# Patient Record
Sex: MALE | Race: ASIAN | NOT HISPANIC OR LATINO | Employment: FULL TIME | URBAN - METROPOLITAN AREA
[De-identification: names, ages, dates, MRNs, and addresses within clinical notes are randomized per-mention and may not be internally consistent; named-entity substitution may affect disease eponyms.]

---

## 2017-12-27 ENCOUNTER — HOSPITAL ENCOUNTER (EMERGENCY)
Facility: CLINIC | Age: 32
Discharge: HOME | End: 2017-12-27
Attending: EMERGENCY MEDICINE | Admitting: EMERGENCY MEDICINE
Payer: COMMERCIAL

## 2017-12-27 ENCOUNTER — APPOINTMENT (OUTPATIENT)
Dept: CT IMAGING | Facility: CLINIC | Age: 32
End: 2017-12-27
Attending: EMERGENCY MEDICINE
Payer: COMMERCIAL

## 2017-12-27 VITALS
TEMPERATURE: 98.3 F | WEIGHT: 193 LBS | RESPIRATION RATE: 16 BRPM | DIASTOLIC BLOOD PRESSURE: 91 MMHG | BODY MASS INDEX: 30.29 KG/M2 | HEART RATE: 81 BPM | HEIGHT: 67 IN | SYSTOLIC BLOOD PRESSURE: 159 MMHG | OXYGEN SATURATION: 97 %

## 2017-12-27 DIAGNOSIS — N20.1 URETEROLITHIASIS: ICD-10-CM

## 2017-12-27 LAB
ALBUMIN UR-MCNC: 30 MG/DL
APPEARANCE UR: CLEAR
BACTERIA #/AREA URNS HPF: ABNORMAL /HPF
BILIRUB UR QL STRIP: NEGATIVE
COLOR UR AUTO: YELLOW
GLUCOSE UR STRIP-MCNC: NEGATIVE MG/DL
HGB UR QL STRIP: ABNORMAL
KETONES UR STRIP-MCNC: NEGATIVE MG/DL
LEUKOCYTE ESTERASE UR QL STRIP: NEGATIVE
MUCOUS THREADS #/AREA URNS LPF: PRESENT /LPF
NITRATE UR QL: NEGATIVE
PH UR STRIP: 5.5 PH (ref 5–7)
RBC #/AREA URNS AUTO: ABNORMAL /HPF
SOURCE: ABNORMAL
SP GR UR STRIP: 1.02 (ref 1–1.03)
UROBILINOGEN UR STRIP-ACNC: 0.2 EU/DL (ref 0.2–1)
WBC #/AREA URNS AUTO: ABNORMAL /HPF

## 2017-12-27 PROCEDURE — 99284 EMERGENCY DEPT VISIT MOD MDM: CPT | Mod: 25

## 2017-12-27 PROCEDURE — 81001 URINALYSIS AUTO W/SCOPE: CPT | Performed by: EMERGENCY MEDICINE

## 2017-12-27 PROCEDURE — 74176 CT ABD & PELVIS W/O CONTRAST: CPT

## 2017-12-27 RX ORDER — OXYCODONE AND ACETAMINOPHEN 5; 325 MG/1; MG/1
1-2 TABLET ORAL EVERY 4 HOURS PRN
Qty: 20 TABLET | Refills: 0 | Status: ON HOLD | OUTPATIENT
Start: 2017-12-27 | End: 2018-04-24

## 2017-12-27 RX ORDER — TAMSULOSIN HYDROCHLORIDE 0.4 MG/1
0.4 CAPSULE ORAL DAILY
Qty: 10 CAPSULE | Refills: 0 | Status: SHIPPED | OUTPATIENT
Start: 2017-12-27 | End: 2018-01-06

## 2017-12-27 NOTE — ED AVS SNAPSHOT
Emergency Department    [address] [place] [address]    Phone:  [phone]    Fax:  [phone]                                       Alonso Kan   MRN: [id number]    Department:   Emergency Department   Date of Visit:  12/27/2017           After Visit Summary Signature Page     I have received my discharge instructions, and my questions have been answered. I have discussed any challenges I see with this plan with the nurse or doctor.    ..........................................................................................................................................  Patient/Patient Representative Signature      ..........................................................................................................................................  Patient Representative Print Name and Relationship to Patient    ..................................................               ................................................  Date                                            Time    ..........................................................................................................................................  Reviewed by Signature/Title    ...................................................              ..............................................  Date                                                            Time

## 2017-12-27 NOTE — ED AVS SNAPSHOT
Emergency Department    [address] [facility] [address]    Phone:  [phone]    Fax:  [phone]                                       Alonso Kan   MRN: [id number]    Department:   Emergency Department   Date of Visit:  12/27/2017           Patient Information     Date Of Birth          1985        Your diagnoses for this visit were:     Ureterolithiasis        You were seen by Trierweiler, Chad A, MD.      Follow-up Information     Follow up with Rosales Sánchez MD In 1 week.    Specialty:  Urology    Why:  As needed    Contact information:    [address]  [facility] [address] [phone]          Follow up with  Emergency Department.    Specialty:  EMERGENCY MEDICINE    Why:  If symptoms worsen    Contact information:    [address] [facility] [address] [phone]        Discharge Instructions       Discharge Instructions  Kidney Stones    Kidney stones are a common problem that can cause a lot of pain but fortunately are usually not dangerous. Kidney stones form in the kidney and then can cause a blockage (obstruction) of the flow of urine from the kidney which leads to pain. Most patients can manage kidney stones at home (without a hospital stay).  However, sometimes your condition may be worse than it seemed at first, or may get worse with time. Most kidney stones will pass on their own, but occasionally stones may need to be removed by an urologist.    Generally, every Emergency Department visit should have a follow-up clinic visit with either a primary or a specialty clinic/provider. Please follow-up as instructed by your emergency provider today.      Return to the Emergency Department if:    Your pain is not controlled despite the medications provided or recommended.    You are vomiting (throwing up) and cannot keep fluids or medications down.    You develop a fever (>100.4 F).    You feel much more ill or develop new  symptoms.  What can I do to help myself?    Be sure to drink plenty of fluids.    If instructed to do so, strain your urine (pee) with the urine strainer you were provided with today. Your stone may look like a grain of sand or a small pebble. Collect any stones in the cup provided and bring to your follow-up appointment.    Staying active is good, and may help the stone to pass. You may do whatever you feel up to doing without restrictions.   Treatment:    Non-steroidal anti-inflammatory drugs (NSAIDs). This includes prescription medicines like Toradol  (ketorolac) and non-prescription medicines like Advil  (ibuprofen) and Nuprin  (ibuprofen) and Naproxen. These pain relievers are very effective for kidney stones.    Nausea (sick to your stomach) medication.  Nausea and vomiting are common with kidney stones, so your provider may send you home with medicine for this.     Flomax  (tamsulosin). This medicine is sometimes used for men with prostate problems, but also can help kidney stones to pass. Its effectiveness is controversial or questionable so it is prescribed in certain situations. This medicine can lower blood pressure, and you may feel faint/lightheaded, especially when you first stand up. Be sure to get up gradually, sit down if you feel faint, and avoid activity where feeling faint would be dangerous, such as climbing ladders.  If you were given a prescription for medicine here today, be sure to read all of the information (including the package insert) that comes with your prescription.  This will include important information about the medicine, its side effects, and any warnings that you need to know about.  The pharmacist who fills the prescription can provide more information and answer questions you may have about the medicine.  If you have questions or concerns that the pharmacist cannot address, please call or return to the Emergency Department.   Remember that you can always come back to the  Emergency Department if you are not able to see your regular provider in the amount of time listed above, if you get any new symptoms, or if there is anything that worries you.      24 Hour Appointment Hotline       To make an appointment at any [facility], call [phone] ([phone]). If you don't have a family doctor or clinic, we will help you find one. [facility] clinics are conveniently located to serve the needs of you and your family.             Review of your medicines      START taking        Dose / Directions Last dose taken    oxyCODONE-acetaminophen 5-325 MG per tablet   Commonly known as:  PERCOCET   Dose:  1-2 tablet   Quantity:  20 tablet        Take 1-2 tablets by mouth every 4 hours as needed for moderate to severe pain   Refills:  0        tamsulosin 0.4 MG capsule   Commonly known as:  FLOMAX   Dose:  0.4 mg   Quantity:  10 capsule        Take 1 capsule (0.4 mg) by mouth daily for 10 doses   Refills:  0                Prescriptions were sent or printed at these locations (2 Prescriptions)                   Other Prescriptions                Printed at Department/Unit printer (2 of 2)         tamsulosin (FLOMAX) 0.4 MG capsule               oxyCODONE-acetaminophen (PERCOCET) 5-325 MG per tablet                Procedures and tests performed during your visit     *UA reflex to Microscopic (ED Lab POCT Only 3-11)    Abd/pelvis CT no contrast - Stone Protocol    Urine Microscopic      Orders Needing Specimen Collection     None      Pending Results     Date and Time Order Name Status Description    12/27/2017 2131 Abd/pelvis CT no contrast - Stone Protocol Preliminary             Pending Culture Results     No orders found from 12/25/2017 to 12/28/2017.            Pending Results Instructions     If you had any lab results that were not finalized at the time of your Discharge, you can call the ED Lab Result RN at [phone]. You will be contacted by this team for any positive Lab results  or changes in treatment. The nurses are available 7 days a week from 10A to 6:30P.  You can leave a message 24 hours per day and they will return your call.        Test Results From Your Hospital Stay        12/27/2017 10:09 PM      Component Results     Component Value Ref Range & Units Status    Color Urine Yellow  Final    Appearance Urine Clear  Final    Glucose Urine Negative NEG^Negative mg/dL Final    Bilirubin Urine Negative NEG^Negative Final    Ketones Urine Negative NEG^Negative mg/dL Final    Specific Gravity Urine 1.025 1.003 - 1.035 Final    Blood Urine Large (A) NEG^Negative Final    pH Urine 5.5 5.0 - 7.0 pH Final    Protein Albumin Urine 30 (A) NEG^Negative mg/dL Final    Urobilinogen Urine 0.2 0.2 - 1.0 EU/dL Final    Nitrite Urine Negative NEG^Negative Final    Leukocyte Esterase Urine Negative NEG^Negative Final    Source Midstream Urine  Final         12/27/2017 10:00 PM      Narrative     CT ABDOMEN AND PELVIS WITHOUT CONTRAST 12/27/2017 9:45 PM     HISTORY: Right flank pain.     Radiation dose for this scan was reduced using automated exposure  control, adjustment of the mA and/or kV according to patient size, or  iterative reconstruction technique.    FINDINGS: There is marked hepatic fatty infiltration. Mild right  hydronephrosis and ureteral dilatation is noted extending down to a  0.3 cm obstructing stone at the S1 foraminal level. No additional  renal stones are demonstrated. There is a normal appendix. Moderate  fecal debris is noted in the colon with no pericolonic inflammatory  stranding. No bowel obstruction. No free peritoneal fluid or air.  Pelvic contents appear within normal limits for the unenhanced  technique.        Impression     IMPRESSION:  1. Right mid ureteral 0.3 cm obstructing stone at the S1 foraminal  level with associated mild right hydronephrosis.  2. Hepatic fatty infiltration--possible etiologies include consumption  of alcohol or excessive carbohydrate intake,  especially  sugar/fructose.  Metabolic syndrome commonly occurs in combination  with nonalcoholic fatty liver disease. Although often reversible,  nonalcoholic fatty liver disease can progress to steatohepatitis and  cirrhosis.         12/27/2017 10:09 PM      Component Results     Component Value Ref Range & Units Status    WBC Urine O - 2 OTO2^O - 2 /HPF Final    RBC Urine 10-25 (A) OTO2^O - 2 /HPF Final    Bacteria Urine Few (A) NEG^Negative /HPF Final    Mucous Urine Present (A) NEG^Negative /LPF Final                Clinical Quality Measure: Blood Pressure Screening     Your blood pressure was checked while you were in the emergency department today. The last reading we obtained was  BP: (!) 167/99 . Please read the guidelines below about what these numbers mean and what you should do about them.  If your systolic blood pressure (the top number) is less than 120 and your diastolic blood pressure (the bottom number) is less than 80, then your blood pressure is normal. There is nothing more that you need to do about it.  If your systolic blood pressure (the top number) is 120-139 or your diastolic blood pressure (the bottom number) is 80-89, your blood pressure may be higher than it should be. You should have your blood pressure rechecked within a year by a primary care provider.  If your systolic blood pressure (the top number) is 140 or greater or your diastolic blood pressure (the bottom number) is 90 or greater, you may have high blood pressure. High blood pressure is treatable, but if left untreated over time it can put you at risk for heart attack, stroke, or kidney failure. You should have your blood pressure rechecked by a primary care provider within the next 4 weeks.  If your provider in the emergency department today gave you specific instructions to follow-up with your doctor or provider even sooner than that, you should follow that instruction and not wait for up to 4 weeks for your follow-up  "visit.        Thank you for choosing [facility]       Thank you for choosing [facility] for your care. Our goal is always to provide you with excellent care. Hearing back from our patients is one way we can continue to improve our services. Please take a few minutes to complete the written survey that you may receive in the mail after you visit with us. Thank you!        [employer] Information     [employer] lets you send messages to your doctor, view your test results, renew your prescriptions, schedule appointments and more. To sign up, go to [email or web address] . Click on \"Log in\" on the left side of the screen, which will take you to the Welcome page. Then click on \"Sign up Now\" on the right side of the page.     You will be asked to enter the access code listed below, as well as some personal information. Please follow the directions to create your username and password.     Your access code is: [email or web address]  Expires: 3/27/2018 10:28 PM     Your access code will  in 90 days. If you need help or a new code, please call your [facility] clinic or [phone].        Care EveryWhere ID     This is your Care EveryWhere ID. This could be used by other organizations to access your [facility] medical records  [id number]        Equal Access to Services     SHARMIN HAYNES AH: Hadmalena pattersono Soingaali, waaxda luqadaha, qaybta kaalmada adeegyada, consuelo stover. So [facility] [phone].    ATENCIÓN: Si habla español, tiene a garland disposición servicios gratuitos de asistencia lingüística. Llame al [phone].    We comply with applicable federal civil rights laws and [place] laws. We do not discriminate on the basis of race, color, national origin, age, disability, sex, sexual orientation, or gender identity.            After Visit Summary       This is your record. Keep this with you and show to your community pharmacist(s) and doctor(s) at your next visit.                  "

## 2017-12-28 NOTE — ED PROVIDER NOTES
"  History     Chief Complaint:  Abdominal Pain (Right upper Abd pain for 1 hr. Denies N/V)       NILSON Kan is a 32 year old male who presents with complaints of right-sided abdominal pain that radiates into his lower abdomen.  The pain started intermittently this afternoon.  He has spells where it is very severe and then it seems to completely resolve.  He has associated sensation that he needs to urinate regularly, wondering if he is not able to completely void.  He denies any dysuria or fevers.  He has never had pain like this before.  He does not think that it is related to eating.  He denies vomiting or diarrhea.  He denies other complaints at this juncture.    Allergies:  NKDA        Medications:    None    Past Medical History:    History reviewed. No pertinent past medical history.    There are no active problems to display for this patient.       Past Surgical History:    History reviewed. No pertinent surgical history.     Family History:    family history is not on file.    Social History:   reports that he has never smoked. He has never used smokeless tobacco. He reports that he does not use illicit drugs.    PCP: No Ref-Primary, Physician     Review of Systems  Pertinent positives and negatives as above.  All other systems reviewed as negative.      Physical Exam   Patient Vitals for the past 24 hrs:   BP Temp Temp src Pulse Resp SpO2 Height Weight   12/27/17 2234 (!) 159/91 - - 81 16 97 % - -   12/27/17 2034 (!) 167/99 98.3  F (36.8  C) Oral 89 16 97 % 1.702 m (5' 7\") 87.5 kg (193 lb)        Physical Exam  Eye:  Pupils are equal, round, and reactive.  Extraocular movements intact.    ENT:  No rhinorrhea.  Moist mucus membranes.  Normal tongue and tonsil.    Cardiac:  Regular rate and rhythm.  No murmurs, gallops, or rubs.    Pulmonary:  Clear to auscultation bilaterally.  No wheezes, rales, or rhonchi.    Abdomen:  Positive bowel sounds.  Abdomen is soft and non-distended, without focal " tenderness.  Despite complaints of pain in the RUQ/flank, palpation does not exacerbate the pain.    Musculoskeletal:  Normal movement of all extremities without evidence for deficit.    Skin:  Warm and dry without rashes.    Neurologic:  Non-focal exam without asymmetric weakness or numbness.     Psychiatric:  Normal affect with appropriate interaction with examiner.    Emergency Department Course     Imaging:    Abd/pelvis CT no contrast - Stone Protocol   Preliminary Result   IMPRESSION:   1. Right mid ureteral 0.3 cm obstructing stone at the S1 foraminal   level with associated mild right hydronephrosis.   2. Hepatic fatty infiltration--possible etiologies include consumption   of alcohol or excessive carbohydrate intake, especially   sugar/fructose.  Metabolic syndrome commonly occurs in combination   with nonalcoholic fatty liver disease. Although often reversible,   nonalcoholic fatty liver disease can progress to steatohepatitis and   cirrhosis.         All imaging results were discussed with the patient who voiced understanding of the findings.    Laboratory:  Labs Ordered and Resulted from Time of ED Arrival Up to the Time of Departure from the ED   UA MACROSCOPIC WITH REFLEX TO MICRO - Abnormal; Notable for the following:        Result Value    Blood Urine Large (*)     Protein Albumin Urine 30 (*)     All other components within normal limits   URINE MICROSCOPIC - Abnormal; Notable for the following:     RBC Urine 10-25 (*)     Bacteria Urine Few (*)     Mucous Urine Present (*)     All other components within normal limits       Emergency Department Course:  Past medical records, nursing notes, and vitals reviewed.  I performed an exam of the patient and obtained history, as documented above.  I rechecked the patient. Findings and plan explained to the Patient and friend. Patient was comfortable with the plan for discharge.    Impression & Plan    Medical Decision Making:  Healthy young man presents to  us with intermittent right-sided flank and abdominal pain with associated urinary frequency.  Kidney stone was highest on the differential.  This is  confirmed with CT along with hematuria in the urine without signs of infection.  It is a small stone, already at the midpoint of the ureter.  Explained to the patient this is highly likely to pass on its own within the next several days.  He will be discharged on a regimen of ibuprofen, Flomax, and Percocet.  He was advised to follow-up with urology next week if he does not feel completely improved with immediate return for any fevers, worsening of his pain, or other emergent concerns.  Diagnosis:    ICD-10-CM    1. Ureterolithiasis N20.1         Discharge Medications:  Discharge Medication List as of 12/27/2017 10:28 PM      START taking these medications    Details   tamsulosin (FLOMAX) 0.4 MG capsule Take 1 capsule (0.4 mg) by mouth daily for 10 doses, Disp-10 capsule, R-0, Local Print      oxyCODONE-acetaminophen (PERCOCET) 5-325 MG per tablet Take 1-2 tablets by mouth every 4 hours as needed for moderate to severe pain, Disp-20 tablet, R-0, Local Print              12/27/2017   Chad Trierweiler, MD Trierweiler, Chad A, MD  12/27/17 2390

## 2018-04-24 ENCOUNTER — APPOINTMENT (OUTPATIENT)
Dept: OCCUPATIONAL THERAPY | Facility: CLINIC | Age: 33
End: 2018-04-24
Payer: COMMERCIAL

## 2018-04-24 ENCOUNTER — APPOINTMENT (OUTPATIENT)
Dept: CARDIOLOGY | Facility: CLINIC | Age: 33
End: 2018-04-24
Attending: EMERGENCY MEDICINE
Payer: COMMERCIAL

## 2018-04-24 ENCOUNTER — APPOINTMENT (OUTPATIENT)
Dept: GENERAL RADIOLOGY | Facility: CLINIC | Age: 33
End: 2018-04-24
Attending: EMERGENCY MEDICINE
Payer: COMMERCIAL

## 2018-04-24 ENCOUNTER — HOSPITAL ENCOUNTER (INPATIENT)
Facility: CLINIC | Age: 33
LOS: 3 days | Discharge: HOME | End: 2018-04-27
Attending: EMERGENCY MEDICINE | Admitting: INTERNAL MEDICINE
Payer: COMMERCIAL

## 2018-04-24 DIAGNOSIS — I21.3 ST ELEVATION MYOCARDIAL INFARCTION (STEMI), UNSPECIFIED ARTERY (H): ICD-10-CM

## 2018-04-24 DIAGNOSIS — Z98.61 POSTSURGICAL PERCUTANEOUS TRANSLUMINAL CORONARY ANGIOPLASTY STATUS: Primary | ICD-10-CM

## 2018-04-24 DIAGNOSIS — E78.5 HYPERLIPIDEMIA LDL GOAL <70: ICD-10-CM

## 2018-04-24 LAB
ALT SERPL W P-5'-P-CCNC: 150 U/L (ref 0–70)
ANION GAP SERPL CALCULATED.3IONS-SCNC: 13 MMOL/L (ref 3–14)
AST SERPL W P-5'-P-CCNC: 652 U/L (ref 0–45)
BASOPHILS # BLD AUTO: 0 10E9/L (ref 0–0.2)
BASOPHILS NFR BLD AUTO: 0.3 %
BUN SERPL-MCNC: 8 MG/DL (ref 7–30)
CALCIUM SERPL-MCNC: 8.4 MG/DL (ref 8.5–10.1)
CHLORIDE SERPL-SCNC: 103 MMOL/L (ref 94–109)
CHOLEST SERPL-MCNC: 240 MG/DL
CO2 SERPL-SCNC: 21 MMOL/L (ref 20–32)
CREAT SERPL-MCNC: 0.8 MG/DL (ref 0.66–1.25)
D DIMER PPP FEU-MCNC: <0.3 UG/ML FEU (ref 0–0.5)
DIFFERENTIAL METHOD BLD: ABNORMAL
EOSINOPHIL # BLD AUTO: 0.3 10E9/L (ref 0–0.7)
EOSINOPHIL NFR BLD AUTO: 2.6 %
ERYTHROCYTE [DISTWIDTH] IN BLOOD BY AUTOMATED COUNT: 12.2 % (ref 10–15)
GFR SERPL CREATININE-BSD FRML MDRD: >90 ML/MIN/1.7M2
GLUCOSE SERPL-MCNC: 162 MG/DL (ref 70–99)
HCT VFR BLD AUTO: 41.7 % (ref 40–53)
HDLC SERPL-MCNC: 35 MG/DL
HGB BLD-MCNC: 15.3 G/DL (ref 13.3–17.7)
IMM GRANULOCYTES # BLD: 0 10E9/L (ref 0–0.4)
IMM GRANULOCYTES NFR BLD: 0.3 %
INTERPRETATION ECG - MUSE: NORMAL
INTERPRETATION ECG - MUSE: NORMAL
KCT BLD-ACNC: 245 SEC (ref 75–150)
LDLC SERPL CALC-MCNC: 162 MG/DL
LYMPHOCYTES # BLD AUTO: 6.1 10E9/L (ref 0.8–5.3)
LYMPHOCYTES NFR BLD AUTO: 52.6 %
MCH RBC QN AUTO: 32.3 PG (ref 26.5–33)
MCHC RBC AUTO-ENTMCNC: 36.7 G/DL (ref 31.5–36.5)
MCV RBC AUTO: 88 FL (ref 78–100)
MONOCYTES # BLD AUTO: 1 10E9/L (ref 0–1.3)
MONOCYTES NFR BLD AUTO: 8.9 %
NEUTROPHILS # BLD AUTO: 4.1 10E9/L (ref 1.6–8.3)
NEUTROPHILS NFR BLD AUTO: 35.3 %
NONHDLC SERPL-MCNC: 205 MG/DL
NRBC # BLD AUTO: 0 10*3/UL
NRBC BLD AUTO-RTO: 0 /100
PLATELET # BLD AUTO: 381 10E9/L (ref 150–450)
POTASSIUM SERPL-SCNC: 2.8 MMOL/L (ref 3.4–5.3)
POTASSIUM SERPL-SCNC: 3.9 MMOL/L (ref 3.4–5.3)
RBC # BLD AUTO: 4.74 10E12/L (ref 4.4–5.9)
SODIUM SERPL-SCNC: 137 MMOL/L (ref 133–144)
TRIGL SERPL-MCNC: 213 MG/DL
TROPONIN I SERPL-MCNC: 164.15 UG/L (ref 0–0.04)
TROPONIN I SERPL-MCNC: 181.54 UG/L (ref 0–0.04)
TROPONIN I SERPL-MCNC: 99.33 UG/L (ref 0–0.04)
TROPONIN I SERPL-MCNC: <0.015 UG/L (ref 0–0.04)
TROPONIN I SERPL-MCNC: >200 UG/L (ref 0–0.04)
WBC # BLD AUTO: 11.5 10E9/L (ref 4–11)

## 2018-04-24 PROCEDURE — B2111ZZ FLUOROSCOPY OF MULTIPLE CORONARY ARTERIES USING LOW OSMOLAR CONTRAST: ICD-10-PCS | Performed by: INTERNAL MEDICINE

## 2018-04-24 PROCEDURE — 21000000 ZZH R&B IMCU HEART CARE

## 2018-04-24 PROCEDURE — 25000132 ZZH RX MED GY IP 250 OP 250 PS 637: Performed by: PHYSICIAN ASSISTANT

## 2018-04-24 PROCEDURE — 71045 X-RAY EXAM CHEST 1 VIEW: CPT

## 2018-04-24 PROCEDURE — 36415 COLL VENOUS BLD VENIPUNCTURE: CPT | Performed by: PHYSICIAN ASSISTANT

## 2018-04-24 PROCEDURE — 97165 OT EVAL LOW COMPLEX 30 MIN: CPT | Mod: GO | Performed by: OCCUPATIONAL THERAPIST

## 2018-04-24 PROCEDURE — C9606 PERC D-E COR REVASC W AMI S: HCPCS

## 2018-04-24 PROCEDURE — 25000128 H RX IP 250 OP 636: Performed by: INTERNAL MEDICINE

## 2018-04-24 PROCEDURE — 93005 ELECTROCARDIOGRAM TRACING: CPT | Mod: 76

## 2018-04-24 PROCEDURE — 27210795 ZZH PAD DEFIB QUICK CR4

## 2018-04-24 PROCEDURE — 84484 ASSAY OF TROPONIN QUANT: CPT | Performed by: EMERGENCY MEDICINE

## 2018-04-24 PROCEDURE — C1769 GUIDE WIRE: HCPCS

## 2018-04-24 PROCEDURE — 80061 LIPID PANEL: CPT | Performed by: EMERGENCY MEDICINE

## 2018-04-24 PROCEDURE — 27211089 ZZH KIT ACIST INJECTOR CR3

## 2018-04-24 PROCEDURE — 027034Z DILATION OF CORONARY ARTERY, ONE ARTERY WITH DRUG-ELUTING INTRALUMINAL DEVICE, PERCUTANEOUS APPROACH: ICD-10-PCS | Performed by: INTERNAL MEDICINE

## 2018-04-24 PROCEDURE — 93458 L HRT ARTERY/VENTRICLE ANGIO: CPT | Mod: XU

## 2018-04-24 PROCEDURE — 84484 ASSAY OF TROPONIN QUANT: CPT | Performed by: PHYSICIAN ASSISTANT

## 2018-04-24 PROCEDURE — 27210787 ZZH MANIFOLD CR2

## 2018-04-24 PROCEDURE — 84132 ASSAY OF SERUM POTASSIUM: CPT | Performed by: PHYSICIAN ASSISTANT

## 2018-04-24 PROCEDURE — 84450 TRANSFERASE (AST) (SGOT): CPT | Performed by: PHYSICIAN ASSISTANT

## 2018-04-24 PROCEDURE — 25000132 ZZH RX MED GY IP 250 OP 250 PS 637

## 2018-04-24 PROCEDURE — 93005 ELECTROCARDIOGRAM TRACING: CPT

## 2018-04-24 PROCEDURE — 36415 COLL VENOUS BLD VENIPUNCTURE: CPT | Performed by: INTERNAL MEDICINE

## 2018-04-24 PROCEDURE — 27210892 ZZH CATH CR4

## 2018-04-24 PROCEDURE — 27210856 ZZH ACCESS HEART CATH CR2

## 2018-04-24 PROCEDURE — C1725 CATH, TRANSLUMIN NON-LASER: HCPCS

## 2018-04-24 PROCEDURE — 85347 COAGULATION TIME ACTIVATED: CPT

## 2018-04-24 PROCEDURE — 27210759 ZZH DEVICE INFLATION CR6

## 2018-04-24 PROCEDURE — 99152 MOD SED SAME PHYS/QHP 5/>YRS: CPT

## 2018-04-24 PROCEDURE — 25000128 H RX IP 250 OP 636: Performed by: EMERGENCY MEDICINE

## 2018-04-24 PROCEDURE — C1887 CATHETER, GUIDING: HCPCS

## 2018-04-24 PROCEDURE — 99291 CRITICAL CARE FIRST HOUR: CPT | Performed by: INTERNAL MEDICINE

## 2018-04-24 PROCEDURE — 27210914 ZZH SHEATH CR8

## 2018-04-24 PROCEDURE — 80048 BASIC METABOLIC PNL TOTAL CA: CPT | Performed by: EMERGENCY MEDICINE

## 2018-04-24 PROCEDURE — 85379 FIBRIN DEGRADATION QUANT: CPT | Performed by: EMERGENCY MEDICINE

## 2018-04-24 PROCEDURE — 97535 SELF CARE MNGMENT TRAINING: CPT | Mod: GO | Performed by: OCCUPATIONAL THERAPIST

## 2018-04-24 PROCEDURE — 84484 ASSAY OF TROPONIN QUANT: CPT | Performed by: INTERNAL MEDICINE

## 2018-04-24 PROCEDURE — 40000133 ZZH STATISTIC OT WARD VISIT: Performed by: OCCUPATIONAL THERAPIST

## 2018-04-24 PROCEDURE — 84460 ALANINE AMINO (ALT) (SGPT): CPT | Performed by: PHYSICIAN ASSISTANT

## 2018-04-24 PROCEDURE — 99285 EMERGENCY DEPT VISIT HI MDM: CPT | Mod: 25

## 2018-04-24 PROCEDURE — 99152 MOD SED SAME PHYS/QHP 5/>YRS: CPT | Mod: GC | Performed by: INTERNAL MEDICINE

## 2018-04-24 PROCEDURE — 96374 THER/PROPH/DIAG INJ IV PUSH: CPT

## 2018-04-24 PROCEDURE — 27210998 ZZH ACCESS HEART CATH CR6

## 2018-04-24 PROCEDURE — 25000132 ZZH RX MED GY IP 250 OP 250 PS 637: Performed by: EMERGENCY MEDICINE

## 2018-04-24 PROCEDURE — 25000128 H RX IP 250 OP 636

## 2018-04-24 PROCEDURE — 4A023N7 MEASUREMENT OF CARDIAC SAMPLING AND PRESSURE, LEFT HEART, PERCUTANEOUS APPROACH: ICD-10-PCS | Performed by: INTERNAL MEDICINE

## 2018-04-24 PROCEDURE — 93458 L HRT ARTERY/VENTRICLE ANGIO: CPT | Mod: 26 | Performed by: INTERNAL MEDICINE

## 2018-04-24 PROCEDURE — 92941 PRQ TRLML REVSC TOT OCCL AMI: CPT | Mod: RC | Performed by: INTERNAL MEDICINE

## 2018-04-24 PROCEDURE — 27210742 ZZH CATH CR1

## 2018-04-24 PROCEDURE — 25000132 ZZH RX MED GY IP 250 OP 250 PS 637: Performed by: INTERNAL MEDICINE

## 2018-04-24 PROCEDURE — 99153 MOD SED SAME PHYS/QHP EA: CPT

## 2018-04-24 PROCEDURE — 93010 ELECTROCARDIOGRAM REPORT: CPT | Mod: 76 | Performed by: INTERNAL MEDICINE

## 2018-04-24 PROCEDURE — 25000125 ZZHC RX 250: Performed by: INTERNAL MEDICINE

## 2018-04-24 PROCEDURE — 85025 COMPLETE CBC W/AUTO DIFF WBC: CPT | Performed by: EMERGENCY MEDICINE

## 2018-04-24 PROCEDURE — 27210946 ZZH KIT HC TOTES DISP CR8

## 2018-04-24 PROCEDURE — 97110 THERAPEUTIC EXERCISES: CPT | Mod: GO | Performed by: OCCUPATIONAL THERAPIST

## 2018-04-24 PROCEDURE — C1874 STENT, COATED/COV W/DEL SYS: HCPCS

## 2018-04-24 RX ORDER — PHENYLEPHRINE HCL IN 0.9% NACL 1 MG/10 ML
20-100 SYRINGE (ML) INTRAVENOUS
Status: DISCONTINUED | OUTPATIENT
Start: 2018-04-24 | End: 2018-04-24 | Stop reason: HOSPADM

## 2018-04-24 RX ORDER — ATORVASTATIN CALCIUM 40 MG/1
40 TABLET, FILM COATED ORAL DAILY
Status: DISCONTINUED | OUTPATIENT
Start: 2018-04-24 | End: 2018-04-24

## 2018-04-24 RX ORDER — EPINEPHRINE 1 MG/ML
0.3 INJECTION, SOLUTION, CONCENTRATE INTRAVENOUS
Status: DISCONTINUED | OUTPATIENT
Start: 2018-04-24 | End: 2018-04-24 | Stop reason: HOSPADM

## 2018-04-24 RX ORDER — SODIUM CHLORIDE 9 MG/ML
INJECTION, SOLUTION INTRAVENOUS CONTINUOUS
Status: ACTIVE | OUTPATIENT
Start: 2018-04-24 | End: 2018-04-24

## 2018-04-24 RX ORDER — NITROGLYCERIN 0.4 MG/1
0.4 TABLET SUBLINGUAL EVERY 5 MIN PRN
Status: DISCONTINUED | OUTPATIENT
Start: 2018-04-24 | End: 2018-04-27 | Stop reason: HOSPADM

## 2018-04-24 RX ORDER — ATROPINE SULFATE 0.1 MG/ML
0.5 INJECTION INTRAVENOUS EVERY 5 MIN PRN
Status: ACTIVE | OUTPATIENT
Start: 2018-04-24 | End: 2018-04-24

## 2018-04-24 RX ORDER — NITROGLYCERIN 5 MG/ML
100-200 VIAL (ML) INTRAVENOUS
Status: DISCONTINUED | OUTPATIENT
Start: 2018-04-24 | End: 2018-04-24 | Stop reason: HOSPADM

## 2018-04-24 RX ORDER — CLOPIDOGREL BISULFATE 75 MG/1
300-600 TABLET ORAL
Status: DISCONTINUED | OUTPATIENT
Start: 2018-04-24 | End: 2018-04-24 | Stop reason: HOSPADM

## 2018-04-24 RX ORDER — NICARDIPINE HYDROCHLORIDE 2.5 MG/ML
100 INJECTION INTRAVENOUS
Status: DISCONTINUED | OUTPATIENT
Start: 2018-04-24 | End: 2018-04-24 | Stop reason: HOSPADM

## 2018-04-24 RX ORDER — DIPHENHYDRAMINE HYDROCHLORIDE 50 MG/ML
25-50 INJECTION INTRAMUSCULAR; INTRAVENOUS
Status: DISCONTINUED | OUTPATIENT
Start: 2018-04-24 | End: 2018-04-24 | Stop reason: HOSPADM

## 2018-04-24 RX ORDER — VERAPAMIL HYDROCHLORIDE 2.5 MG/ML
1-2.5 INJECTION, SOLUTION INTRAVENOUS
Status: COMPLETED | OUTPATIENT
Start: 2018-04-24 | End: 2018-04-24

## 2018-04-24 RX ORDER — CLOPIDOGREL BISULFATE 75 MG/1
75 TABLET ORAL
Status: DISCONTINUED | OUTPATIENT
Start: 2018-04-24 | End: 2018-04-24 | Stop reason: HOSPADM

## 2018-04-24 RX ORDER — HEPARIN SODIUM 1000 [USP'U]/ML
1000-10000 INJECTION, SOLUTION INTRAVENOUS; SUBCUTANEOUS EVERY 5 MIN PRN
Status: DISCONTINUED | OUTPATIENT
Start: 2018-04-24 | End: 2018-04-24 | Stop reason: HOSPADM

## 2018-04-24 RX ORDER — LORAZEPAM 2 MG/ML
.5-2 INJECTION INTRAMUSCULAR EVERY 4 HOURS PRN
Status: DISCONTINUED | OUTPATIENT
Start: 2018-04-24 | End: 2018-04-24 | Stop reason: HOSPADM

## 2018-04-24 RX ORDER — MORPHINE SULFATE 4 MG/ML
1-2 INJECTION, SOLUTION INTRAMUSCULAR; INTRAVENOUS EVERY 5 MIN PRN
Status: DISCONTINUED | OUTPATIENT
Start: 2018-04-24 | End: 2018-04-24 | Stop reason: HOSPADM

## 2018-04-24 RX ORDER — MORPHINE SULFATE 4 MG/ML
2 INJECTION, SOLUTION INTRAMUSCULAR; INTRAVENOUS EVERY 6 HOURS PRN
Status: DISCONTINUED | OUTPATIENT
Start: 2018-04-24 | End: 2018-04-27 | Stop reason: HOSPADM

## 2018-04-24 RX ORDER — LISINOPRIL 2.5 MG/1
2.5 TABLET ORAL DAILY
Status: DISCONTINUED | OUTPATIENT
Start: 2018-04-24 | End: 2018-04-24

## 2018-04-24 RX ORDER — ASPIRIN 81 MG/1
324 TABLET, CHEWABLE ORAL ONCE
Status: COMPLETED | OUTPATIENT
Start: 2018-04-24 | End: 2018-04-24

## 2018-04-24 RX ORDER — ENALAPRILAT 1.25 MG/ML
1.25-2.5 INJECTION INTRAVENOUS
Status: DISCONTINUED | OUTPATIENT
Start: 2018-04-24 | End: 2018-04-24 | Stop reason: HOSPADM

## 2018-04-24 RX ORDER — PROTAMINE SULFATE 10 MG/ML
1-5 INJECTION, SOLUTION INTRAVENOUS
Status: DISCONTINUED | OUTPATIENT
Start: 2018-04-24 | End: 2018-04-24 | Stop reason: HOSPADM

## 2018-04-24 RX ORDER — HYDRALAZINE HYDROCHLORIDE 20 MG/ML
10-20 INJECTION INTRAMUSCULAR; INTRAVENOUS
Status: DISCONTINUED | OUTPATIENT
Start: 2018-04-24 | End: 2018-04-24 | Stop reason: HOSPADM

## 2018-04-24 RX ORDER — LISINOPRIL 2.5 MG/1
2.5 TABLET ORAL ONCE
Status: COMPLETED | OUTPATIENT
Start: 2018-04-24 | End: 2018-04-24

## 2018-04-24 RX ORDER — METOPROLOL TARTRATE 1 MG/ML
5 INJECTION, SOLUTION INTRAVENOUS EVERY 5 MIN PRN
Status: DISCONTINUED | OUTPATIENT
Start: 2018-04-24 | End: 2018-04-24 | Stop reason: HOSPADM

## 2018-04-24 RX ORDER — HYDROCODONE BITARTRATE AND ACETAMINOPHEN 5; 325 MG/1; MG/1
1-2 TABLET ORAL EVERY 4 HOURS PRN
Status: DISCONTINUED | OUTPATIENT
Start: 2018-04-24 | End: 2018-04-27 | Stop reason: HOSPADM

## 2018-04-24 RX ORDER — ASPIRIN 81 MG/1
81-324 TABLET, CHEWABLE ORAL
Status: DISCONTINUED | OUTPATIENT
Start: 2018-04-24 | End: 2018-04-24 | Stop reason: HOSPADM

## 2018-04-24 RX ORDER — LIDOCAINE HYDROCHLORIDE 10 MG/ML
1-10 INJECTION, SOLUTION EPIDURAL; INFILTRATION; INTRACAUDAL; PERINEURAL
Status: COMPLETED | OUTPATIENT
Start: 2018-04-24 | End: 2018-04-24

## 2018-04-24 RX ORDER — SODIUM NITROPRUSSIDE 25 MG/ML
100-200 INJECTION INTRAVENOUS
Status: DISCONTINUED | OUTPATIENT
Start: 2018-04-24 | End: 2018-04-24 | Stop reason: HOSPADM

## 2018-04-24 RX ORDER — IOPAMIDOL 755 MG/ML
180 INJECTION, SOLUTION INTRAVASCULAR ONCE
Status: COMPLETED | OUTPATIENT
Start: 2018-04-24 | End: 2018-04-24

## 2018-04-24 RX ORDER — FUROSEMIDE 10 MG/ML
20-100 INJECTION INTRAMUSCULAR; INTRAVENOUS
Status: DISCONTINUED | OUTPATIENT
Start: 2018-04-24 | End: 2018-04-24 | Stop reason: HOSPADM

## 2018-04-24 RX ORDER — LISINOPRIL 5 MG/1
5 TABLET ORAL DAILY
Status: DISCONTINUED | OUTPATIENT
Start: 2018-04-25 | End: 2018-04-25

## 2018-04-24 RX ORDER — BUPIVACAINE HYDROCHLORIDE 2.5 MG/ML
1-10 INJECTION, SOLUTION EPIDURAL; INFILTRATION; INTRACAUDAL
Status: DISCONTINUED | OUTPATIENT
Start: 2018-04-24 | End: 2018-04-24 | Stop reason: HOSPADM

## 2018-04-24 RX ORDER — DEXTROSE MONOHYDRATE 25 G/50ML
12.5-5 INJECTION, SOLUTION INTRAVENOUS EVERY 30 MIN PRN
Status: DISCONTINUED | OUTPATIENT
Start: 2018-04-24 | End: 2018-04-24 | Stop reason: HOSPADM

## 2018-04-24 RX ORDER — POTASSIUM CHLORIDE 1500 MG/1
20-40 TABLET, EXTENDED RELEASE ORAL
Status: DISCONTINUED | OUTPATIENT
Start: 2018-04-24 | End: 2018-04-27 | Stop reason: HOSPADM

## 2018-04-24 RX ORDER — PROMETHAZINE HYDROCHLORIDE 25 MG/ML
6.25-25 INJECTION, SOLUTION INTRAMUSCULAR; INTRAVENOUS EVERY 4 HOURS PRN
Status: DISCONTINUED | OUTPATIENT
Start: 2018-04-24 | End: 2018-04-24 | Stop reason: HOSPADM

## 2018-04-24 RX ORDER — NIFEDIPINE 10 MG/1
10 CAPSULE ORAL
Status: DISCONTINUED | OUTPATIENT
Start: 2018-04-24 | End: 2018-04-24 | Stop reason: HOSPADM

## 2018-04-24 RX ORDER — POTASSIUM CHLORIDE 1.5 G/1.58G
20-40 POWDER, FOR SOLUTION ORAL
Status: DISCONTINUED | OUTPATIENT
Start: 2018-04-24 | End: 2018-04-27 | Stop reason: HOSPADM

## 2018-04-24 RX ORDER — FLUMAZENIL 0.1 MG/ML
0.2 INJECTION, SOLUTION INTRAVENOUS
Status: DISCONTINUED | OUTPATIENT
Start: 2018-04-24 | End: 2018-04-24 | Stop reason: HOSPADM

## 2018-04-24 RX ORDER — ASPIRIN 81 MG/1
81 TABLET ORAL DAILY
Status: DISCONTINUED | OUTPATIENT
Start: 2018-04-24 | End: 2018-04-27 | Stop reason: HOSPADM

## 2018-04-24 RX ORDER — NITROGLYCERIN 20 MG/100ML
.07-2 INJECTION INTRAVENOUS CONTINUOUS PRN
Status: DISCONTINUED | OUTPATIENT
Start: 2018-04-24 | End: 2018-04-24 | Stop reason: HOSPADM

## 2018-04-24 RX ORDER — ATROPINE SULFATE 0.1 MG/ML
.5-1 INJECTION INTRAVENOUS
Status: DISCONTINUED | OUTPATIENT
Start: 2018-04-24 | End: 2018-04-24 | Stop reason: HOSPADM

## 2018-04-24 RX ORDER — ADENOSINE 3 MG/ML
12-12000 INJECTION, SOLUTION INTRAVENOUS
Status: DISCONTINUED | OUTPATIENT
Start: 2018-04-24 | End: 2018-04-24 | Stop reason: HOSPADM

## 2018-04-24 RX ORDER — FENTANYL CITRATE 50 UG/ML
25-50 INJECTION, SOLUTION INTRAMUSCULAR; INTRAVENOUS
Status: DISCONTINUED | OUTPATIENT
Start: 2018-04-24 | End: 2018-04-24 | Stop reason: HOSPADM

## 2018-04-24 RX ORDER — ONDANSETRON 2 MG/ML
4 INJECTION INTRAMUSCULAR; INTRAVENOUS EVERY 4 HOURS PRN
Status: DISCONTINUED | OUTPATIENT
Start: 2018-04-24 | End: 2018-04-24 | Stop reason: HOSPADM

## 2018-04-24 RX ORDER — FENTANYL CITRATE 50 UG/ML
25-50 INJECTION, SOLUTION INTRAMUSCULAR; INTRAVENOUS
Status: ACTIVE | OUTPATIENT
Start: 2018-04-24 | End: 2018-04-24

## 2018-04-24 RX ORDER — NITROGLYCERIN 0.4 MG/1
0.4 TABLET SUBLINGUAL EVERY 5 MIN PRN
Status: DISCONTINUED | OUTPATIENT
Start: 2018-04-24 | End: 2018-04-24 | Stop reason: HOSPADM

## 2018-04-24 RX ORDER — NALOXONE HYDROCHLORIDE 0.4 MG/ML
.2-.4 INJECTION, SOLUTION INTRAMUSCULAR; INTRAVENOUS; SUBCUTANEOUS
Status: DISCONTINUED | OUTPATIENT
Start: 2018-04-24 | End: 2018-04-24

## 2018-04-24 RX ORDER — ATORVASTATIN CALCIUM 40 MG/1
40 TABLET, FILM COATED ORAL ONCE
Status: COMPLETED | OUTPATIENT
Start: 2018-04-24 | End: 2018-04-24

## 2018-04-24 RX ORDER — POTASSIUM CHLORIDE 7.45 MG/ML
10 INJECTION INTRAVENOUS
Status: DISCONTINUED | OUTPATIENT
Start: 2018-04-24 | End: 2018-04-27 | Stop reason: HOSPADM

## 2018-04-24 RX ORDER — POTASSIUM CHLORIDE 29.8 MG/ML
20 INJECTION INTRAVENOUS
Status: DISCONTINUED | OUTPATIENT
Start: 2018-04-24 | End: 2018-04-24 | Stop reason: HOSPADM

## 2018-04-24 RX ORDER — PRASUGREL 10 MG/1
10-60 TABLET, FILM COATED ORAL
Status: DISCONTINUED | OUTPATIENT
Start: 2018-04-24 | End: 2018-04-24 | Stop reason: HOSPADM

## 2018-04-24 RX ORDER — DOBUTAMINE HYDROCHLORIDE 200 MG/100ML
2-20 INJECTION INTRAVENOUS CONTINUOUS PRN
Status: DISCONTINUED | OUTPATIENT
Start: 2018-04-24 | End: 2018-04-24 | Stop reason: HOSPADM

## 2018-04-24 RX ORDER — ATORVASTATIN CALCIUM 80 MG/1
80 TABLET, FILM COATED ORAL DAILY
Status: DISCONTINUED | OUTPATIENT
Start: 2018-04-25 | End: 2018-04-27 | Stop reason: HOSPADM

## 2018-04-24 RX ORDER — NALOXONE HYDROCHLORIDE 0.4 MG/ML
0.4 INJECTION, SOLUTION INTRAMUSCULAR; INTRAVENOUS; SUBCUTANEOUS EVERY 5 MIN PRN
Status: DISCONTINUED | OUTPATIENT
Start: 2018-04-24 | End: 2018-04-24 | Stop reason: HOSPADM

## 2018-04-24 RX ORDER — NALOXONE HYDROCHLORIDE 0.4 MG/ML
.1-.4 INJECTION, SOLUTION INTRAMUSCULAR; INTRAVENOUS; SUBCUTANEOUS
Status: DISCONTINUED | OUTPATIENT
Start: 2018-04-24 | End: 2018-04-27 | Stop reason: HOSPADM

## 2018-04-24 RX ORDER — POTASSIUM CHLORIDE 7.45 MG/ML
10 INJECTION INTRAVENOUS ONCE
Status: DISCONTINUED | OUTPATIENT
Start: 2018-04-24 | End: 2018-04-24

## 2018-04-24 RX ORDER — POTASSIUM CL/LIDO/0.9 % NACL 10MEQ/0.1L
10 INTRAVENOUS SOLUTION, PIGGYBACK (ML) INTRAVENOUS
Status: DISCONTINUED | OUTPATIENT
Start: 2018-04-24 | End: 2018-04-27 | Stop reason: HOSPADM

## 2018-04-24 RX ORDER — POTASSIUM CHLORIDE 7.45 MG/ML
10 INJECTION INTRAVENOUS
Status: DISCONTINUED | OUTPATIENT
Start: 2018-04-24 | End: 2018-04-24

## 2018-04-24 RX ORDER — METOPROLOL SUCCINATE 25 MG/1
25 TABLET, EXTENDED RELEASE ORAL DAILY
Status: DISCONTINUED | OUTPATIENT
Start: 2018-04-24 | End: 2018-04-27 | Stop reason: HOSPADM

## 2018-04-24 RX ORDER — ACETAMINOPHEN 325 MG/1
325-650 TABLET ORAL EVERY 4 HOURS PRN
Status: DISCONTINUED | OUTPATIENT
Start: 2018-04-24 | End: 2018-04-27 | Stop reason: HOSPADM

## 2018-04-24 RX ORDER — LIDOCAINE HYDROCHLORIDE 10 MG/ML
30 INJECTION, SOLUTION EPIDURAL; INFILTRATION; INTRACAUDAL; PERINEURAL
Status: DISCONTINUED | OUTPATIENT
Start: 2018-04-24 | End: 2018-04-24 | Stop reason: HOSPADM

## 2018-04-24 RX ORDER — PROTAMINE SULFATE 10 MG/ML
25-100 INJECTION, SOLUTION INTRAVENOUS EVERY 5 MIN PRN
Status: DISCONTINUED | OUTPATIENT
Start: 2018-04-24 | End: 2018-04-24 | Stop reason: HOSPADM

## 2018-04-24 RX ORDER — FLUMAZENIL 0.1 MG/ML
0.2 INJECTION, SOLUTION INTRAVENOUS
Status: ACTIVE | OUTPATIENT
Start: 2018-04-24 | End: 2018-04-24

## 2018-04-24 RX ORDER — POTASSIUM CHLORIDE 29.8 MG/ML
20 INJECTION INTRAVENOUS
Status: DISCONTINUED | OUTPATIENT
Start: 2018-04-24 | End: 2018-04-27 | Stop reason: HOSPADM

## 2018-04-24 RX ORDER — DOPAMINE HYDROCHLORIDE 160 MG/100ML
2-20 INJECTION, SOLUTION INTRAVENOUS CONTINUOUS PRN
Status: DISCONTINUED | OUTPATIENT
Start: 2018-04-24 | End: 2018-04-24 | Stop reason: HOSPADM

## 2018-04-24 RX ORDER — NITROGLYCERIN 5 MG/ML
100-500 VIAL (ML) INTRAVENOUS
Status: COMPLETED | OUTPATIENT
Start: 2018-04-24 | End: 2018-04-24

## 2018-04-24 RX ORDER — ASPIRIN 325 MG
325 TABLET ORAL
Status: DISCONTINUED | OUTPATIENT
Start: 2018-04-24 | End: 2018-04-24 | Stop reason: HOSPADM

## 2018-04-24 RX ORDER — METHYLPREDNISOLONE SODIUM SUCCINATE 125 MG/2ML
125 INJECTION, POWDER, LYOPHILIZED, FOR SOLUTION INTRAMUSCULAR; INTRAVENOUS
Status: DISCONTINUED | OUTPATIENT
Start: 2018-04-24 | End: 2018-04-24 | Stop reason: HOSPADM

## 2018-04-24 RX ADMIN — LISINOPRIL 2.5 MG: 2.5 TABLET ORAL at 08:20

## 2018-04-24 RX ADMIN — VERAPAMIL HYDROCHLORIDE 2.5 MG: 2.5 INJECTION, SOLUTION INTRAVENOUS at 04:46

## 2018-04-24 RX ADMIN — TICAGRELOR 180 MG: 90 TABLET ORAL at 04:17

## 2018-04-24 RX ADMIN — ASPIRIN 81 MG: 81 TABLET, COATED ORAL at 08:20

## 2018-04-24 RX ADMIN — TICAGRELOR 90 MG: 90 TABLET ORAL at 19:04

## 2018-04-24 RX ADMIN — HEPARIN SODIUM 3000 UNITS: 1000 INJECTION, SOLUTION INTRAVENOUS; SUBCUTANEOUS at 04:56

## 2018-04-24 RX ADMIN — SODIUM CHLORIDE: 9 INJECTION, SOLUTION INTRAVENOUS at 06:32

## 2018-04-24 RX ADMIN — POTASSIUM CHLORIDE 10 MEQ: 7.46 INJECTION, SOLUTION INTRAVENOUS at 04:58

## 2018-04-24 RX ADMIN — MIDAZOLAM 1 MG: 1 INJECTION INTRAMUSCULAR; INTRAVENOUS at 05:01

## 2018-04-24 RX ADMIN — MORPHINE SULFATE 2 MG: 4 INJECTION, SOLUTION INTRAMUSCULAR; INTRAVENOUS at 21:36

## 2018-04-24 RX ADMIN — HEPARIN SODIUM 5500 UNITS: 1000 INJECTION, SOLUTION INTRAVENOUS; SUBCUTANEOUS at 04:15

## 2018-04-24 RX ADMIN — ATORVASTATIN CALCIUM 40 MG: 40 TABLET, FILM COATED ORAL at 12:05

## 2018-04-24 RX ADMIN — FENTANYL CITRATE 50 MCG: 50 INJECTION, SOLUTION INTRAMUSCULAR; INTRAVENOUS at 04:47

## 2018-04-24 RX ADMIN — MIDAZOLAM 1 MG: 1 INJECTION INTRAMUSCULAR; INTRAVENOUS at 04:40

## 2018-04-24 RX ADMIN — POTASSIUM CHLORIDE 10 MEQ: 10 INJECTION, SOLUTION INTRAVENOUS at 04:57

## 2018-04-24 RX ADMIN — ASPIRIN 81 MG 324 MG: 81 TABLET ORAL at 04:03

## 2018-04-24 RX ADMIN — MIDAZOLAM 1 MG: 1 INJECTION INTRAMUSCULAR; INTRAVENOUS at 05:16

## 2018-04-24 RX ADMIN — HEPARIN SODIUM 5000 UNITS: 1000 INJECTION, SOLUTION INTRAVENOUS; SUBCUTANEOUS at 04:42

## 2018-04-24 RX ADMIN — POTASSIUM CHLORIDE 40 MEQ: 1500 TABLET, EXTENDED RELEASE ORAL at 10:15

## 2018-04-24 RX ADMIN — LIDOCAINE HYDROCHLORIDE 1 ML: 10 INJECTION, SOLUTION EPIDURAL; INFILTRATION; INTRACAUDAL; PERINEURAL at 04:42

## 2018-04-24 RX ADMIN — ATORVASTATIN CALCIUM 40 MG: 40 TABLET, FILM COATED ORAL at 08:20

## 2018-04-24 RX ADMIN — NITROGLYCERIN 200 MCG: 5 INJECTION, SOLUTION INTRAVENOUS at 04:46

## 2018-04-24 RX ADMIN — IOPAMIDOL 180 ML: 755 INJECTION, SOLUTION INTRAVASCULAR at 05:30

## 2018-04-24 RX ADMIN — FENTANYL CITRATE 50 MCG: 50 INJECTION, SOLUTION INTRAMUSCULAR; INTRAVENOUS at 04:40

## 2018-04-24 RX ADMIN — NITROGLYCERIN 400 MCG: 5 INJECTION, SOLUTION INTRAVENOUS at 05:20

## 2018-04-24 RX ADMIN — SODIUM CHLORIDE 1000 ML: 9 INJECTION, SOLUTION INTRAVENOUS at 04:41

## 2018-04-24 RX ADMIN — LISINOPRIL 2.5 MG: 2.5 TABLET ORAL at 12:04

## 2018-04-24 RX ADMIN — POTASSIUM CHLORIDE 40 MEQ: 1500 TABLET, EXTENDED RELEASE ORAL at 13:26

## 2018-04-24 RX ADMIN — METOPROLOL SUCCINATE 25 MG: 25 TABLET, EXTENDED RELEASE ORAL at 08:20

## 2018-04-24 RX ADMIN — MIDAZOLAM 1 MG: 1 INJECTION INTRAMUSCULAR; INTRAVENOUS at 04:47

## 2018-04-24 ASSESSMENT — ENCOUNTER SYMPTOMS
SHORTNESS OF BREATH: 1
VOMITING: 1
CHOKING: 0
NAUSEA: 1
COUGH: 1

## 2018-04-24 ASSESSMENT — PAIN DESCRIPTION - DESCRIPTORS
DESCRIPTORS: DISCOMFORT
DESCRIPTORS: DISCOMFORT

## 2018-04-24 ASSESSMENT — ACTIVITIES OF DAILY LIVING (ADL): PREVIOUS_RESPONSIBILITIES: MEAL PREP;HOUSEKEEPING;LAUNDRY;SHOPPING;MEDICATION MANAGEMENT;FINANCES;DRIVING;WORK

## 2018-04-24 NOTE — PROGRESS NOTES
04/24/18 1415   Quick Adds   Type of Visit Initial Occupational Therapy Evaluation   Living Environment   Lives With alone   Living Arrangements apartment   Home Accessibility (elevator access but does use stairs)   Transportation Available car   Self-Care   Usual Activity Tolerance excellent   Current Activity Tolerance good   Regular Exercise yes   Activity/Exercise Type walking   Exercise Amount/Frequency 5-7 times/wk  (3-5 miles/day, when nice outside, not much ex during winter)   Functional Level Prior   Ambulation 0-->independent   Transferring 0-->independent   Toileting 0-->independent   Bathing 0-->independent   Dressing 0-->independent   Eating 0-->independent   Communication 0-->understands/communicates without difficulty   Swallowing 0-->swallows foods/liquids without difficulty   Cognition 0 - no cognition issues reported   Fall history within last six months no   Prior Functional Level Comment Pt works at [employer] full time   General Information   Onset of Illness/Injury or Date of Surgery - Date 04/24/18   Referring Physician Trev Pa MD   Patient/Family Goals Statement retrurn home   Additional Occupational Profile Info/Pertinent History of Current Problem STEMI, s/p angio with PCI to RCA. troponin peaked 200.    Precautions/Limitations (MI precautions)   Heart Disease Risk Factors Family history;Gender;High blood pressure;Dislipidemia;Stress;Medical history   Cognitive Status Examination   Orientation orientation to person, place and time   Level of Consciousness alert   Able to Follow Commands WNL/WFL   Personal Safety (Cognitive) WNL/WFL   Memory intact   Attention No deficits were identified   Organization/Problem Solving No deficits were identified   Executive Function No deficits were identified   Sensory Examination   Sensory Quick Adds No deficits were identified   Pain Assessment   Patient Currently in Pain No   Range of Motion (ROM)   ROM Comment B UE AROM WFL   Transfer Skills  "  Transfer Comments Pt I with ADLs and functional mobility   Instrumental Activities of Daily Living (IADL)   Previous Responsibilities meal prep;housekeeping;laundry;shopping;medication management;finances;driving;work   Activities of Daily Living Analysis   Impairments Contributing to Impaired Activities of Daily Living post surgical precautions   General Therapy Interventions   Planned Therapy Interventions risk factor education;progressive activity/exercise;home program guidelines   Clinical Impression   Criteria for Skilled Therapeutic Interventions Met yes, treatment indicated   OT Diagnosis decreased IADL's   Influenced by the following impairments MI precautions, CAD risk factors   Assessment of Occupational Performance 1-3 Performance Deficits   Identified Performance Deficits impaired IADL's ie driving, heavier cleaning, etc   Clinical Decision Making (Complexity) Low complexity   Therapy Frequency 2 times/day   Predicted Duration of Therapy Intervention (days/wks) 2 days   Anticipated Discharge Disposition Home with Outpatient Therapy  (OP CR [facility])   Risks and Benefits of Treatment have been explained. Yes   Patient, Family & other staff in agreement with plan of care Yes   [facility] AM-PAC  \"6 Clicks\" Daily Activity Inpatient Short Form   1. Putting on and taking off regular lower body clothing? 4 - None   2. Bathing (including washing, rinsing, drying)? 4 - None   3. Toileting, which includes using toilet, bedpan or urinal? 4 - None   4. Putting on and taking off regular upper body clothing? 4 - None   5. Taking care of personal grooming such as brushing teeth? 4 - None   6. Eating meals? 4 - None   Daily Activity Raw Score (Score out of 24.Lower scores equate to lower levels of function) 24   Total Evaluation Time   Total Evaluation Time (Minutes) 10     "

## 2018-04-24 NOTE — PLAN OF CARE
Problem: Patient Care Overview  Goal: Plan of Care/Patient Progress Review  Outcome: Improving  Pt. A&O. Tele NSR to Sinus tach with activity. HTN, lisinopril increased. Denied chest pain and SOB. Reported mild nausea with activity after cardiac rehab. Tolerating food. CMS Intact. Left radial dry and intact. Lung sounds clear. Given and educated on CAD binder, STENT ID/ brochure, and CAD medication handout.

## 2018-04-24 NOTE — PROGRESS NOTES
Patient seen post-intervention. No complaints. Would intensify statin regimen (80 mg) and will check baseline LFTs. Also increase lisinopril for BP control to 5 mg/day.  Trop >200  Echo pending.  3-day stay anticipated in absence of complications.

## 2018-04-24 NOTE — IP AVS SNAPSHOT
MRN:[id number]                      After Visit Summary   4/24/2018    Alonso Kan    MRN: [id number]           Thank you!     Thank you for choosing [facility] for your care. Our goal is always to provide you with excellent care. Hearing back from our patients is one way we can continue to improve our services. Please take a few minutes to complete the written survey that you may receive in the mail after you visit with us. Thank you!        Patient Information     Date Of Birth          1985        Designated Caregiver       Most Recent Value    Caregiver    Will someone help with your care after discharge? no      About your hospital stay     You were admitted on:  April 24, 2018 You last received care in the:  [facility]    You were discharged on:  April 27, 2018        Reason for your hospital stay       You had an acute myocardial infarction (heart attack)  Received drug coated stent to the artery to the back of the heart.                  Who to Call     For medical emergencies, please call 911.  For non-urgent questions about your medical care, please call your primary care provider or clinic, [phone]          Attending Provider     Provider Specialty    Travis Go MD Emergency Medicine    [facility]       Primary Care Provider Office Phone # Fax #    [facility] [phone]      After Care Instructions     Activity       As tolerated.            Diet       Heart healthy, low sodium diet                  Follow-up Appointments     Follow-up and recommended labs and tests        Follow up in the heart clinic in 1 week for Labs and with CATHY.                  Your next 10 appointments already scheduled     May 03, 2018  6:00 PM CDT   Office Visit with Jayne Shah MD   [facility]    [address] [facility] [id number] [phone]            Bring a current list of meds and any records pertaining to this visit. For Physicals, please bring immunization records and any forms needing to be filled out. Please arrive 10 minutes early to complete paperwork.            May 08, 2018  2:00 PM CDT   Cardiac Evaluation with  Cardiac Rehab 3   [facility] ([facility])    [address]  [facility] [address]   [phone]            May 09, 2018  9:10 AM CDT   LAB with [facility]   [facility] [place] ([facility])    [address]  [facility] [address] [phone]           Please do not eat 10-12 hours before your appointment if you are coming in fasting for labs on lipids, cholesterol, or glucose (sugar). This does not apply to pregnant women. Water, hot tea and black coffee (with nothing added) are okay. Do not drink other fluids, diet soda or chew gum.            May 09, 2018 10:10 AM CDT   Return Discharge with FARHANA Escobar CNP   [facility] ([facility])    [address]  [facility] [address] [phone] OPT 2              Additional Services     CARDIAC REHAB REFERRAL       Patient may choose their preference of the site for Cardiac Rehab.            CARDIAC REHAB REFERRAL       Please be aware that coverage of these services is subject to the terms and limitations of your health insurance plan. Call member services at your health plan with any benefit or coverage questions.    Order is sent electronically to central rehab scheduling. Call [phone] if you haven't been contacted regarding these appointments within 2 business days of discharge.            Med Therapy Manage Referral       Your provider has referred you to: **[facility] Scheduling (numerous locations) [phone]    [email or web address]  [facility]: [facility] [place] [phone]   [email or web address]    Reason for Referral: STEMI    The [facility] department will contact you to schedule an appointment.  You may also schedule the appointment by calling [phone].  For [facility] [place] patients, please call [phone] to confirm/schedule your appointment on the next business day.    This service is designed to help you get the most from your medications.  A specially trained Pharmacist will work closely with you and your providers to solve any questions, concerns, issues or problems related to your medications.    Please bring all of your prescription and non-prescription medications (such as vitamins, over-the-counter medications, and herbals) or a detailed medication list to your appointment.    If you have a glucose meter or other home monitoring information, please also bring this to your appointment (i.e. blood glucose log, blood pressure log, pain log, etc.).            Follow-Up with Cardiac Advanced Practice Provider                 Future tests that were ordered for you     Basic metabolic panel                 Further instructions from your care team       Cardiac Angioplasty/Stent Discharge Instructions - Radial    After you go home:      Have an adult stay with you until tomorrow.    Drink extra fluids for 2 days.    You may resume your normal diet.    No smoking       For 24 hours - due to the sedation you received:    Relax and take it easy.    Do NOT make any important or legal decisions.    Do NOT drive or operate machines at home or at work.    Do NOT drink alcohol.    Care of Wrist Puncture Site:      For the first 24 hrs - check the puncture site every 1-2 hours while awake.    It is normal to have soreness at the puncture site and mild tingling in your hand for up to 3  days.    Remove the bandaid after 24 hours. If there is minor oozing, apply another bandaid and remove it after 12 hours.    You may shower tomorrow.  Do NOT take a bath, or use a hot tub or pool for at least 3 days. Do NOT scrub the site. Do not use lotion or powder near the puncture site.           Activity:          For 2 days:     do not use your hand or arm to support your weight (such as rising from a chair)     do not bend your wrist (such as lifting a garage door).    do not lift more than 5 pounds or exercise your arm (such as tennis, golf or bowling).    Do NOT do any heavy activity such as exercise, lifting, or straining.     Bleeding:      If you start bleeding from the site in your wrist, sit down and press firmly on/above the site for 10 minutes.     Once bleeding stops, keep arm still for 2 hours.     Call [facility] as soon as you can.       Call 911 right away if you have heavy bleeding or bleeding that does not stop.      Medicines:      If you are taking antiplatelet medications such as Plavix, Brilinta or Effient, do not stop taking it until you talk to your cardiologist.        If you are on Metformin (Glucophage), do not restart it until you have blood tests (within 2 to 3 days after discharge).  After you have your blood drawn, you may restart the Metformin.     Take your medications, including blood thinners, unless your provider tells you not to.  If you take Coumadin (Warfarin), have your INR checked by your provider in  3-5 days. Call your clinic to schedule this.    If you have stopped any medicines, check with your provider about when to restart them.    Follow Up Appointments:      Follow up with [facility] Nurse Practitioner at [facility] of patient preference in 7-10 days.    Cardiac Rehab will contact you for follow up care.    Call the clinic if:      You have a large or growing hard lump around the site.    The site is red, swollen, hot or tender.    Blood or fluid is draining  "from the site.    You have chills or a fever greater than 101 F (38 C).    Your arm feels numb, cool or changes color.    You have hives, a rash or unusual itching.    Any questions or concerns.    Other Instructions:      If you received a stent - carry your stent card with you at all times.      [facility] [place]:    [phone] UMP (7 days a week)          Pending Results     No orders found from 2018 to 2018.            Statement of Approval     Ordered          18 0952  I have reviewed and agree with all the recommendations and orders detailed in this document.  EFFECTIVE NOW     Approved and electronically signed by:  Mariposa Kelley APRN CNP             Admission Information     Date & Time Provider Department Dept. Phone    2018 Shannon Dempsey MD [facility] Unit [phone]      Your Vitals Were     Blood Pressure Temperature Respirations Height Weight Pulse Oximetry    121/90 98.7  F (37.1  C) (Oral) 12 1.702 m (5' 7\") 79.5 kg (175 lb 3.2 oz) 97%    BMI (Body Mass Index)                   27.44 kg/m2           MyChart Information     [employer] lets you send messages to your doctor, view your test results, renew your prescriptions, schedule appointments and more. To sign up, go to [email or web address] . Click on \"Log in\" on the left side of the screen, which will take you to the Welcome page. Then click on \"Sign up Now\" on the right side of the page.     You will be asked to enter the access code listed below, as well as some personal information. Please follow the directions to create your username and password.     Your access code is: [email or web address]  Expires: 2018  1:08 PM     Your access code will  in 90 days. If you need help or a new code, please call your [place] clinic or [phone].        Care EveryWhere ID     This is your Care EveryWhere ID. This could be used by other organizations to access " your [facility] medical records  [id number]        Equal Access to Services     SHARMIN HAYNES : Hadii stephanie hall Soingaali, waaxda luqadaha, qaybta kaalmada nalini, consuelo ferreramichaelmanny stover. So [facility] [place] [phone].    ATENCIÓN: Si habla español, tiene a garland disposición servicios gratuitos de asistencia lingüística. Llame al [phone].    We comply with applicable federal civil rights laws and [place] laws. We do not discriminate on the basis of race, color, national origin, age, disability, sex, sexual orientation, or gender identity.               Review of your medicines      START taking        Dose / Directions    aspirin 81 MG EC tablet   Used for:  ST elevation myocardial infarction (STEMI), unspecified artery (H)        Dose:  81 mg   Start taking on:  4/28/2018   Take 1 tablet (81 mg) by mouth daily   Quantity:  30 tablet   Refills:  3       atorvastatin 80 MG tablet   Commonly known as:  LIPITOR   Used for:  Hyperlipidemia LDL goal <70        Dose:  80 mg   Take 1 tablet (80 mg) by mouth daily   Quantity:  30 tablet   Refills:  3       lisinopril 2.5 MG tablet   Commonly known as:  PRINIVIL/Zestril   Used for:  ST elevation myocardial infarction (STEMI), unspecified artery (H)        Dose:  2.5 mg   Start taking on:  4/28/2018   Take 1 tablet (2.5 mg) by mouth daily   Quantity:  30 tablet   Refills:  3       metoprolol succinate 25 MG 24 hr tablet   Commonly known as:  TOPROL-XL   Used for:  ST elevation myocardial infarction (STEMI), unspecified artery (H)        Dose:  25 mg   Start taking on:  4/28/2018   Take 1 tablet (25 mg) by mouth daily   Quantity:  30 tablet   Refills:  3       nitroGLYcerin 0.4 MG sublingual tablet   Commonly known as:  NITROSTAT   Used for:  ST elevation myocardial infarction (STEMI), unspecified artery (H), Hyperlipidemia LDL goal <70        For chest pain place 1 tablet under the tongue every 5 minutes for 3 doses. If symptoms persist 5 minutes after 1st  dose call 911.   Quantity:  25 tablet   Refills:  0       ticagrelor 90 MG tablet   Commonly known as:  BRILINTA   Used for:  ST elevation myocardial infarction (STEMI), unspecified artery (H)        Dose:  90 mg   Take 1 tablet (90 mg) by mouth 2 times daily   Quantity:  60 tablet   Refills:  11            Where to get your medicines      These medications were sent to [facility] [place], [place] - [address]  [address], [place] [address]     Phone:  [phone]     aspirin 81 MG EC tablet    atorvastatin 80 MG tablet    lisinopril 2.5 MG tablet    metoprolol succinate 25 MG 24 hr tablet    nitroGLYcerin 0.4 MG sublingual tablet    ticagrelor 90 MG tablet                Protect others around you: Learn how to safely use, store and throw away your medicines at www.disposemymeds.org.             Medication List: This is a list of all your medications and when to take them. Check marks below indicate your daily home schedule. Keep this list as a reference.      Medications           Morning Afternoon Evening Bedtime As Needed    aspirin 81 MG EC tablet   Take 1 tablet (81 mg) by mouth daily   Start taking on:  4/28/2018   Last time this was given:  81 mg on 4/27/2018  9:11 AM                                atorvastatin 80 MG tablet   Commonly known as:  LIPITOR   Take 1 tablet (80 mg) by mouth daily   Last time this was given:  80 mg on 4/26/2018  8:07 PM                                lisinopril 2.5 MG tablet   Commonly known as:  PRINIVIL/Zestril   Take 1 tablet (2.5 mg) by mouth daily   Start taking on:  4/28/2018   Last time this was given:  2.5 mg on 4/27/2018  9:12 AM                                metoprolol succinate 25 MG 24 hr tablet   Commonly known as:  TOPROL-XL   Take 1 tablet (25 mg) by mouth daily   Start taking on:  4/28/2018   Last time this was given:  25 mg on 4/27/2018  9:12 AM                                nitroGLYcerin 0.4 MG sublingual tablet   Commonly known  as:  NITROSTAT   For chest pain place 1 tablet under the tongue every 5 minutes for 3 doses. If symptoms persist 5 minutes after 1st dose call 911.                                ticagrelor 90 MG tablet   Commonly known as:  BRILINTA   Take 1 tablet (90 mg) by mouth 2 times daily   Last time this was given:  90 mg on 4/27/2018  9:12 AM                                          More Information        Lisinopril Oral tablet  What is this medicine?  LISINOPRIL (lyse IN oh pril) is an ACE inhibitor. This medicine is used to treat high blood pressure and heart failure. It is also used to protect the heart immediately after a heart attack.  This medicine may be used for other purposes; ask your health care provider or pharmacist if you have questions.  What should I tell my health care provider before I take this medicine?  They need to know if you have any of these conditions:    diabetes    heart or blood vessel disease    immune system disease like lupus or scleroderma    kidney disease    low blood pressure    previous swelling of the tongue, face, or lips with difficulty breathing, difficulty swallowing, hoarseness, or tightening of the throat    an unusual or allergic reaction to lisinopril, other ACE inhibitors, insect venom, foods, dyes, or preservatives    pregnant or trying to get pregnant    breast-feeding  How should I use this medicine?  Take this medicine by mouth with a glass of water. Follow the directions on your prescription label. You may take this medicine with or without food. Take your medicine at regular intervals. Do not stop taking this medicine except on the advice of your doctor or health care professional.  Talk to your pediatrician regarding the use of this medicine in children. Special care may be needed. While this drug may be prescribed for children as young as 6 years of age for selected conditions, precautions do apply.  Overdosage: If you think you have taken too much of this medicine  contact a poison control center or emergency room at once.  NOTE: This medicine is only for you. Do not share this medicine with others.  What if I miss a dose?  If you miss a dose, take it as soon as you can. If it is almost time for your next dose, take only that dose. Do not take double or extra doses.  What may interact with this medicine?    diuretics    lithium    NSAIDs, medicines for pain and inflammation, like ibuprofen or naproxen    over-the-counter herbal supplements like hawthorn    potassium salts or potassium supplements    salt substitutes  This list may not describe all possible interactions. Give your health care provider a list of all the medicines, herbs, non-prescription drugs, or dietary supplements you use. Also tell them if you smoke, drink alcohol, or use illegal drugs. Some items may interact with your medicine.  What should I watch for while using this medicine?  Visit your doctor or health care professional for regular check ups. Check your blood pressure as directed. Ask your doctor what your blood pressure should be, and when you should contact him or her. Call your doctor or health care professional if you notice an irregular or fast heart beat.  Women should inform their doctor if they wish to become pregnant or think they might be pregnant. There is a potential for serious side effects to an unborn child. Talk to your health care professional or pharmacist for more information.  Check with your doctor or health care professional if you get an attack of severe diarrhea, nausea and vomiting, or if you sweat a lot. The loss of too much body fluid can make it dangerous for you to take this medicine.  You may get drowsy or dizzy. Do not drive, use machinery, or do anything that needs mental alertness until you know how this drug affects you. Do not stand or sit up quickly, especially if you are an older patient. This reduces the risk of dizzy or fainting spells. Alcohol can make you more  drowsy and dizzy. Avoid alcoholic drinks.  Avoid salt substitutes unless you are told otherwise by your doctor or health care professional.  Do not treat yourself for coughs, colds, or pain while you are taking this medicine without asking your doctor or health care professional for advice. Some ingredients may increase your blood pressure.  What side effects may I notice from receiving this medicine?  Side effects that you should report to your doctor or health care professional as soon as possible:    abdominal pain with or without nausea or vomiting    allergic reactions like skin rash or hives, swelling of the hands, feet, face, lips, throat, or tongue    dark urine    difficulty breathing    dizzy, lightheaded or fainting spell    fever or sore throat    irregular heart beat, chest pain    pain or difficulty passing urine    redness, blistering, peeling or loosening of the skin, including inside the mouth    unusually weak    yellowing of the eyes or skin  Side effects that usually do not require medical attention (report to your doctor or health care professional if they continue or are bothersome):    change in taste    cough    decreased sexual function or desire    headache    sun sensitivity    tiredness  This list may not describe all possible side effects. Call your doctor for medical advice about side effects. You may report side effects to FDA at 8-912-FDA-9949.  Where should I keep my medicine?  Keep out of the reach of children.  Store at room temperature between 15 and 30 degrees C (59 and 86 degrees F). Protect from moisture. Keep container tightly closed. Throw away any unused medicine after the expiration date.  NOTE:This sheet is a summary. It may not cover all possible information. If you have questions about this medicine, talk to your doctor, pharmacist, or health care provider. Copyright  2016 Gold Standard                Metoprolol tablets  Brand Name: Lopressor  What is this  medicine?  METOPROLOL (me TOE proe lole) is a beta-blocker. Beta-blockers reduce the workload on the heart and help it to beat more regularly. This medicine is used to treat high blood pressure and to prevent chest pain. It is also used to after a heart attack and to prevent an additional heart attack from occurring.  How should I use this medicine?  Take this medicine by mouth with a drink of water. Follow the directions on the prescription label. Take this medicine immediately after meals. Take your doses at regular intervals. Do not take more medicine than directed. Do not stop taking this medicine suddenly. This could lead to serious heart-related effects.  Talk to your pediatrician regarding the use of this medicine in children. Special care may be needed.  What side effects may I notice from receiving this medicine?  Side effects that you should report to your doctor or health care professional as soon as possible:    allergic reactions like skin rash, itching or hives    cold or numb hands or feet    depression    difficulty breathing    faint    fever with sore throat    irregular heartbeat, chest pain    rapid weight gain    swollen legs or ankles  Side effects that usually do not require medical attention (report to your doctor or health care professional if they continue or are bothersome):    anxiety or nervousness    change in sex drive or performance    dry skin    headache    nightmares or trouble sleeping    short term memory loss    stomach upset or diarrhea    unusually tired  What may interact with this medicine?  This medicine may interact with the following medications:    certain medicines for blood pressure, heart disease, irregular heart beat    certain medicines for depression like monoamine oxidase (MAO) inhibitors, fluoxetine, or paroxetine    clonidine    dobutamine    epinephrine    isoproterenol    reserpine  What if I miss a dose?  If you miss a dose, take it as soon as you can. If it  is almost time for your next dose, take only that dose. Do not take double or extra doses.  Where should I keep my medicine?  Keep out of the reach of children.  Store at room temperature between 15 and 30 degrees C (59 and 86 degrees F). Throw away any unused medicine after the expiration date.  What should I tell my health care provider before I take this medicine?  They need to know if you have any of these conditions:    diabetes    heart or vessel disease like slow heart rate, worsening heart failure, heart block, sick sinus syndrome or Raynaud's disease    kidney disease    liver disease    lung or breathing disease, like asthma or emphysema    pheochromocytoma    thyroid disease    an unusual or allergic reaction to metoprolol, other beta-blockers, medicines, foods, dyes, or preservatives    pregnant or trying to get pregnant    breast-feeding  What should I watch for while using this medicine?  Visit your doctor or health care professional for regular check ups. Contact your doctor right away if your symptoms worsen. Check your blood pressure and pulse rate regularly. Ask your health care professional what your blood pressure and pulse rate should be, and when you should contact them.  You may get drowsy or dizzy. Do not drive, use machinery, or do anything that needs mental alertness until you know how this medicine affects you. Do not sit or stand up quickly, especially if you are an older patient. This reduces the risk of dizzy or fainting spells. Contact your doctor if these symptoms continue. Alcohol may interfere with the effect of this medicine. Avoid alcoholic drinks.  NOTE:This sheet is a summary. It may not cover all possible information. If you have questions about this medicine, talk to your doctor, pharmacist, or health care provider. Copyright  2018 Elsevier                Atorvastatin Calcium Oral tablet  What is this medicine?  ATORVASTATIN (a TORE va sta tin) is known as a HMG-CoA reductase  inhibitor or 'statin'. It lowers the level of cholesterol and triglycerides in the blood. This drug may also reduce the risk of heart attack, stroke, or other health problems in patients with risk factors for heart disease. Diet and lifestyle changes are often used with this drug.  This medicine may be used for other purposes; ask your health care provider or pharmacist if you have questions.  What should I tell my health care provider before I take this medicine?  They need to know if you have any of these conditions:    frequently drink alcoholic beverages    history of stroke, TIA    kidney disease    liver disease    muscle aches or weakness    other medical condition    an unusual or allergic reaction to atorvastatin, other medicines, foods, dyes, or preservatives    pregnant or trying to get pregnant    breast-feeding  How should I use this medicine?  Take this medicine by mouth with a glass of water. Follow the directions on the prescription label. You can take this medicine with or without food. Take your doses at regular intervals. Do not take your medicine more often than directed.  Talk to your pediatrician regarding the use of this medicine in children. While this drug may be prescribed for children as young as 10 years old for selected conditions, precautions do apply.  Overdosage: If you think you have taken too much of this medicine contact a poison control center or emergency room at once.  NOTE: This medicine is only for you. Do not share this medicine with others.  What if I miss a dose?  If you miss a dose, take it as soon as you can. If it is almost time for your next dose, take only that dose. Do not take double or extra doses.  What may interact with this medicine?  Do not take this medicine with any of the following medications:    red yeast rice    telaprevir    telithromycin    voriconazole  This medicine may also interact with the following medications:    alcohol    antiviral medicines for  HIV or AIDS    boceprevir    certain antibiotics like clarithromycin, erythromycin, troleandomycin    certain medicines for cholesterol like fenofibrate or gemfibrozil    cimetidine    clarithromycin    colchicine    cyclosporine    digoxin    female hormones, like estrogens or progestins and birth control pills    grapefruit juice    medicines for fungal infections like fluconazole, itraconazole, ketoconazole    niacin    rifampin    spironolactone  This list may not describe all possible interactions. Give your health care provider a list of all the medicines, herbs, non-prescription drugs, or dietary supplements you use. Also tell them if you smoke, drink alcohol, or use illegal drugs. Some items may interact with your medicine.  What should I watch for while using this medicine?  Visit your doctor or health care professional for regular check-ups. You may need regular tests to make sure your liver is working properly.  Tell your doctor or health care professional right away if you get any unexplained muscle pain, tenderness, or weakness, especially if you also have a fever and tiredness. Your doctor or health care professional may tell you to stop taking this medicine if you develop muscle problems. If your muscle problems do not go away after stopping this medicine, contact your health care professional.  This drug is only part of a total heart-health program. Your doctor or a dietician can suggest a low-cholesterol and low-fat diet to help. Avoid alcohol and smoking, and keep a proper exercise schedule.  Do not use this drug if you are pregnant or breast-feeding. Serious side effects to an unborn child or to an infant are possible. Talk to your doctor or pharmacist for more information.  This medicine may affect blood sugar levels. If you have diabetes, check with your doctor or health care professional before you change your diet or the dose of your diabetic medicine.  If you are going to have surgery tell  your health care professional that you are taking this drug.  What side effects may I notice from receiving this medicine?  Side effects that you should report to your doctor or health care professional as soon as possible:    allergic reactions like skin rash, itching or hives, swelling of the face, lips, or tongue    dark urine    fever    joint pain    muscle cramps, pain    redness, blistering, peeling or loosening of the skin, including inside the mouth    trouble passing urine or change in the amount of urine    unusually weak or tired    yellowing of eyes or skin  Side effects that usually do not require medical attention (report to your doctor or health care professional if they continue or are bothersome):    constipation    heartburn    stomach gas, pain, upset  This list may not describe all possible side effects. Call your doctor for medical advice about side effects. You may report side effects to FDA at 8-267-FDA-3736.  Where should I keep my medicine?  Keep out of the reach of children.  Store at room temperature between 20 to 25 degrees C (68 to 77 degrees F). Throw away any unused medicine after the expiration date.  NOTE:This sheet is a summary. It may not cover all possible information. If you have questions about this medicine, talk to your doctor, pharmacist, or health care provider. Copyright  2016 Gold Standard                Ticagrelor Oral tablet  What is this medicine?  TICAGRELOR (DRE ka GREL or) helps to prevent blood clots. This medicine is used to prevent heart attack, stroke, or other vascular events in people who have had a recent heart attack or who have severe chest pain.  This medicine may be used for other purposes; ask your health care provider or pharmacist if you have questions.  What should I tell my health care provider before I take this medicine?  They need to know if you have any of these conditions:    bleeding disorder    bleeding in the brain    liver disease    planned  surgery    stomach or intestinal ulcers    stroke or transient ischemic attack    an unusual or allergic reaction to ticagrelor, other medicines, foods, dyes, or preservatives    pregnant or trying to get pregnant    breast-feeding  How should I use this medicine?  Take this medicine by mouth with a glass of water. Follow the directions on the prescription label. You can take it with or without food. If it upsets your stomach, take it with food. Take your medicine at regular intervals. Do not take it more often than directed. Do not stop taking except on your doctor's advice.  Talk to you pediatrician regarding the use of this medicine in children. Special care may be needed.  Overdosage: If you think you've taken too much of this medicine contact a poison control center or emergency room at once.  NOTE: This medicine is only for you. Do not share this medicine with others.  What if I miss a dose?  If you miss a dose, take it as soon as you can. If it is almost time for your next dose, take only that dose. Do not take double or extra doses.  What may interact with this medicine?    certain antibiotics like clarithromycin and telithromycin    certain medicines for fungal infections like itraconazole, ketoconazole, and voriconazole    certain medicines for HIV infection like atazanavir, indinavir, nelfinavir, ritonavir, and saquinavir    certain medicines for seizures like carbamazepine, phenobarbital, and phenytoin    certain medicines that treat or prevent blood clots like warfarin    dexamethasone    digoxin    lovastatin    nefazodone    rifampin    simvastatin  This list may not describe all possible interactions. Give your health care provider a list of all the medicines, herbs, non-prescription drugs, or dietary supplements you use. Also tell them if you smoke, drink alcohol, or use illegal drugs. Some items may interact with your medicine.  What should I watch for while using this medicine?  Visit your doctor  or health care professional for regular check ups. Do not stop taking you medicine unless your doctor tells you to.  Notify your doctor or health care professional and seek emergency treatment if you develop breathing problems; changes in vision; chest pain; severe, sudden headache; pain, swelling, warmth in the leg; trouble speaking; sudden numbness or weakness of the face, arm, or leg. These can be signs that your condition has gotten worse.  If you are going to have surgery or dental work, tell your doctor or health care professional that you are taking this medicine.  You should take aspirin every day with this medicine. Do not take more than 100 mg each day. Talk to your doctor if you have questions.  What side effects may I notice from receiving this medicine?  Side effects that you should report to your doctor or health care professional as soon as possible:    allergic reactions like skin rash, itching or hives, swelling of the face, lips, or tongue    breathing problems    fast or irregular heartbeat    feeling faint or light-headed, falls    signs and symptoms of bleeding such as bloody or black, tarry stools; red or dark-brown urine; spitting up blood or brown material that looks like coffee grounds; red spots on the skin; unusual bruising or bleeding from the eye, gums, or nose  Side effects that usually do not require medical attention (Report these to your doctor or health care professional if they continue or are bothersome.):    breast enlargement in both males and females    diarrhea    dizziness    headache    tiredness    upset stomach  This list may not describe all possible side effects. Call your doctor for medical advice about side effects. You may report side effects to FDA at 0-770-QLH-8888.  Where should I keep my medicine?  Keep out of the reach of children.  Store at room temperature of 59 to 86 degrees F (15 to 30 degrees C). Throw away any unused medicine after the expiration  date.  NOTE: This sheet is a summary. It may not cover all possible information. If you have questions about this medicine, talk to your doctor, pharmacist, or health care provider.  NOTE:This sheet is a summary. It may not cover all possible information. If you have questions about this medicine, talk to your doctor, pharmacist, or health care provider. Copyright  2016 Gold Standard

## 2018-04-24 NOTE — PROVIDER NOTIFICATION
MD Notification    Notified Person: MD    Notified Person Name: Dr. Dempsey    Notification Date/Time: 4/24/18 1400    Notification Interaction: Paged    Purpose of Notification:     Orders: Hold statin until after follow up liver functions are completed.

## 2018-04-24 NOTE — PROGRESS NOTES
A follow-up appointment has been made for this patient.     May 03, 2018  6:00 PM CDT   Office Visit with Jayne Shah MD   [facility] ([facility]    [address] [facility] [id number] [phone]

## 2018-04-24 NOTE — PLAN OF CARE
Problem: Patient Care Overview  Goal: Plan of Care/Patient Progress Review  Discharge Planner OT   Patient plan for discharge: home  Current status: Eval completed and treatment initiated. Pt lives in an apt alone, does have elevator access but does use the stairs sometimes. Pt works at [employer] fulltime and I with all ADL/IADL's. Pt admitted due to STEMI, s/p angio and PCI with RCA. Pt independent with functional mobility and ADl's. Pt ambulated approx 150 ft and 120 ft, completed TDM with gradual increase to 1.2 mph for 11.25 mins and completed 15 stairs, pt denies any symptoms and BP blunted and tachy HR increasing to 120-125 with exercise at times. Notified nursing. Pt pleasant and motivated for OP CR and to modify diet low cholesterol and sodium, pt reports is a vegetarian. Pt is from [place] and does not have any family nearby as they all live in [place]. Pt reports will try to visit once a year.   Barriers to return to prior living situation: MI precautions, no family nearby.   Recommendations for discharge: home with A with IADL's as needed, ie per MD driving, heavier cleaning and OP CR at [facility].   Rationale for recommendations: OP CR for monitored progressive exercise and risk factor education and modification.        Entered by: Barbie Moy 04/24/2018 4:07 PM

## 2018-04-24 NOTE — PLAN OF CARE
Problem: Patient Care Overview  Goal: Plan of Care/Patient Progress Review  Outcome: No Change  Pt arrived from cath lab pain free. TR band on left radial site, +CMS, no hematoma. VSS, diastolic BP on higher side. Mild ST elevation noted post-cath. Education needed. Continue to monitor. Tele-SR/ST

## 2018-04-24 NOTE — CONSULTS
Consult Date:  04/24/2018      HISTORY OF PRESENT ILLNESS:  Mr. Kan is a pleasant 32-year-old gentleman who has no significant past medical history.  He is not a smoker and does not have diabetes, but does have a family history of early coronary artery disease.  He presented with acute onset of shortness of breath, nausea, vomiting and chest pain at 0339 this morning.  Apparently last evening he felt a bit nauseated and induced  vomiting which made him feel a bit better.  He went to bed and then woke up early this morning with worsening symptoms for which he presented to the Emergency Department.  He had an EKG performed that showed inferior ST elevation and a Cath lab team was activated.  He was  given aspirin, heparin and Brilinta in the emergency room.  He had no bleeding problems or surgeries planned, no contraindication to drug-eluting stent.  Risks, benefits were discussed.  Troponin on presentation was negative.     PAST MEDICAL HISTORY:  Negative.      ALLERGIES:  NONE.      MEDICATIONS:  None.      PAST SURGICAL HISTORY:  None.      FAMILY HISTORY:  Positive for early coronary artery disease in both parents.      SOCIAL HISTORY:  Never smoked.  He is single, works in software.      REVIEW OF SYSTEMS:  Negative beyond that noted in HPI.      PHYSICAL EXAMINATION:   VITAL SIGNS:  Blood pressure 153/112, heart rate 90, satting 100% on room air.  He is 81.6 kilos.   GENERAL:  He is a male, appearing stated age, lying in bed, in no acute distress.   CARDIOVASCULAR:  He has distant heart sounds but regular rate and rhythm without any significant rubs, murmurs or gallops.   LUNGS:  Bilaterally clear.   ABDOMEN:  Soft, nontender.   EXTREMITIES:  No clubbing, cyanosis or edema.   NECK:  No elevation of his JVP.   PSYCHIATRIC:  Normal affect.   SKIN:  No rashes.   HEENT:  Good dentition, no icterus.      LABORATORY EVALUATION:  Troponin was negative.  Hemoglobin 15, potassium was 2.8, creatinine 0.8.       IMPRESSION AND PLAN:   1.  Inferior ST elevation MI.      Mr. Meraz presented with an inferior ST elevation MI.  He was found to have a proximal RCA occlusion which was treated with a 3.5 x 38 Synergy drug-eluting stent that was postdilated to a 4.0 with a good result.  He does have mild coronary artery disease on the left with very difficult to engage catheters.  Nonselective imaging was needed.  In the future, would recommend either groin or right access for catheterization.  He will be started on Brilinta, statin, beta blocker.  We  will get an echo in the morning.  Potassium was started in the Cath lab.  Presumably is low because of his vomiting.        We had the pleasure of being involved with his care.  30 minutes of critical care time separate from the procedure.         KATERINA BERUMEN MD             D: 2018   T: 2018   MT: ANA      Name:     DALTON MERAZ   MRN:      [id number]        Account:       [id number]   :      1985           Consult Date:  2018      Document: [id number]

## 2018-04-24 NOTE — IP AVS SNAPSHOT
[facility]    [address]    [facility] [address]    Phone:  [phone]                                       After Visit Summary   4/24/2018    Alonso Kan    MRN: [id number]           After Visit Summary Signature Page     I have received my discharge instructions, and my questions have been answered. I have discussed any challenges I see with this plan with the nurse or doctor.    ..........................................................................................................................................  Patient/Patient Representative Signature      ..........................................................................................................................................  Patient Representative Print Name and Relationship to Patient    ..................................................               ................................................  Date                                            Time    ..........................................................................................................................................  Reviewed by Signature/Title    ...................................................              ..............................................  Date                                                            Time

## 2018-04-24 NOTE — PHARMACY-ADMISSION MEDICATION HISTORY
Admission medication history interview status for the 4/24/2018  admission is complete. See EPIC admission navigator for prior to admission medications     Medication history source reliability:Good    Actions taken by pharmacist (provider contacted, etc): interviewed patient     Additional medication history information not noted on PTA med list :None    Medication reconciliation/reorder completed by provider prior to medication history? No    Time spent in this activity: 10 min    Prior to Admission medications    Not on File

## 2018-04-24 NOTE — PHARMACY
Medication coverage check for Brilinta. Patient has monthly copay of $24.99.    Quyen Núñez CphT  [facility]  [employer] Cell: [phone]

## 2018-04-24 NOTE — ED PROVIDER NOTES
"  History     Chief Complaint:  Shortness of Breath    HPI   Alonso Kan is a 32 year old male who presents to the emergency department for evaluation of shortness of breath. The patient reports that he felt very short of breath just before he went to sleep last night; he forced himself to vomit which helped him to sleep until about 0300 when he woke up again with worsened shortness of breath. He presents with continued shortness of breath, but denies any chest pain or other symptoms. He has never experienced a similar episode of shortness of breath in the past. He reports not choking on any food which could have become stuck in his throat. The patient admits to having a mild cough recently, but that this was not the cause of his shortness of breath today. He has no known history of asthma or COPD.    Allergies:  Allergies reviewed. No pertinent allergies.     Medications:    Medications reviewed. No pertinent outpatient prescriptions.    Past Medical History:    History reviewed. No pertinent past medical history.    Past Surgical History:    History reviewed. No pertinent surgical history.    Family History:    CAD Mother, Father    Social History:  Smoking Status: Never Smoker  Smokeless Tobacco: Never Used  Alcohol Use: No  Marital Status: Single    Review of Systems   Respiratory: Positive for cough and shortness of breath. Negative for choking.    Cardiovascular: Negative for chest pain.   Gastrointestinal: Positive for nausea and vomiting.   All other systems reviewed and are negative.    Physical Exam     Patient Vitals for the past 24 hrs:   BP Temp Temp src Heart Rate Resp SpO2 Height Weight   04/24/18 0549 (!) 127/92 97.8  F (36.6  C) Oral 100 29 - - 78.8 kg (173 lb 12.8 oz)   04/24/18 0429 (!) 153/112 - - - - - - -   04/24/18 0400 131/90 - - 83 18 100 % - -   04/24/18 0337 (!) 148/96 95  F (35  C) Temporal 77 16 100 % 1.702 m (5' 7\") 81.6 kg (180 lb)       Physical Exam  General: Appears somewhat " uncomfortable, but talking in sentences.  Moving about in the bed some.  Head: No signs of trauma.   Mouth/Throat: Oropharynx is clear and moist.   CV: Normal rate and regular rhythm.  Equal 2+ radial pulses bilaterally.   Resp: Effort normal and breath sounds normal. No respiratory distress.   GI: Soft. There is no tenderness.  No rebound or guarding.  Normal bowel sounds.  No CVA tenderness.  MSK: Normal range of motion. no edema. No Calf tenderness.  Neuro: The patient is alert and oriented.  Speech normal.  GCS 15  Skin: Skin is warm and dry. No rash noted.   Psych: normal mood and affect. behavior is normal.       Emergency Department Course     ECG 1:  ECG taken at 0351, ECG read at 0352  Sinus rhythm with 1st degree AV block with premature supraventricular complexes  ST elevation, consider inferior injury or acute infarct  **ACUTE MI/STEMI**  Consider right ventricular involvement in acute inferior infarct   Abnormal ECG   Rate 95 bpm. TX interval 210 ms. QRS duration 84 ms. QT/QTc 346/434 ms. P-R-T axes 49 89 104.    ECG 2 (Right-sided):  ECG taken at 0405, ECG read at 0405  Sinus rhythm with occasional premature ventricular complexes  Inferior infarct, possibly acute  Anterolateral infarct, possibly acute  **ACUTE MI/STEMI**  Consider right ventricular involvement in acute inferior infarct   Abnormal ECG   Rate 85 bpm. TX interval 186 ms. QRS duration 82 ms. QT/QTc 360/428 ms. P-R-T axes 44 69 105.    Imaging:  Radiology findings were communicated with the patient who voiced understanding of the findings.    Chest  XR, 1 view PORTABLE  No focal airspace disease or other acute findings.  Normal-sized cardiac silhouette.   Reading per radiology.     Laboratory:  Laboratory findings were communicated with the patient who voiced understanding of the findings.    CBC: WBC 11.5 (H), HGB 15.3,   BMP: Potassium 2.8 (L), Glucose 162 (H), Calcium 8.4 (L) o/w WNL (Creatinine 0.80)  Troponin (Collected 0355):  <0.015   D dimer quantitative: <0.3    Interventions:  0403 Aspirin 324 mg PO   0415 Heparin 5,500 units IV   0417 Brilinta 180 mg PO     Emergency Department Course:    Nursing notes and vitals reviewed.    0350 I performed an exam of the patient as documented above.     0352 The patient's EKG shows an acute STEMI. I updated the patient on this and activated the STEMI protocol.     0412 I spoke with Dr. Swain of cardiology regarding the patient's presentation, findings, and plan of care.     I personally reviewed the laboratory, imaging, and EKG results with the patient and answered all related questions prior to transfer out of emergency department.    0431 Patient was transported to the cath lab.     Impression & Plan      Critical Care time was 40 minutes for this patient excluding procedures.     CMS Diagnoses: The patient has a STEMI     The patient was evaluated at 0350   Patient was transferred  to the cath lab which was activated due to ECG changes.   ASA given in the ER  Medical Decision Making:  Alonso Kan is a 32 year old male who presents to the emergency department today with shortness of breath. At approximately 11:00 pm he felt somewhat short of breath and nauseous, and made himself vomit by putting his fingers down his throat. He apparently felt somewhat better and went to sleep, but woke up feeling significantly short of breath, ultimately prompting him to drive himself to the emergency department. On my initial evaluation, the patient was moving about the bed and seemed somewhat uncomfortable, but he denied having any pain. He was able to talk in full sentences. His vital signs were appropriate with a normal heart rate and satting 100% on room air. I did order an EKG immediately upon seeing the patient which showed a STEMI in the inferior leads. I also did a right-sided EKG which showed elevation in V4, V5, and V6, so I did not given Nitroglycerin. Cath laboratory was activated immediately  upon reviewing the EKG and I did speak with Dr. Swain with interventional cardiology. She did request that I give the Brilinta along with the heparin and aspirin. I have a low suspicion for other etiology of the patient's symptoms as he does not have risk factors for pulmonary embolism and he is not tachycardic or hypoxic. He is also not having any pain, and in particular not having any back pain with regards to dissection. Patient was taken to the cath laboratory once they were prepared for him. He remained stable through his time in the ER.    Critical Care Time:  35 minutes     Diagnosis:    ICD-10-CM    1. Postsurgical percutaneous transluminal coronary angioplasty status Z98.61 CARDIAC REHAB REFERRAL   2. ST elevation myocardial infarction (STEMI), unspecified artery (H) I21.3 CBC with platelets + differential     Basic metabolic panel     Troponin I (now)     D dimer quantitative     Disposition:   Patient was transported to the cath laboratory.      Scribe Disclosure:  Audelia GRIJALVA, am serving as a scribe at 3:39 AM on 4/24/2018 to document services personally performed by Travis Go MD based on my observations and the provider's statements to me.     EMERGENCY DEPARTMENT       Travis Go MD  04/24/18 3265

## 2018-04-24 NOTE — PHARMACY
Medication coverage check for Brilinta. Patient has monthly copay of $24.99.    Quyen Núñez CphT  [facility] Discharge Pharmacy Liaison  [employer] Cell: [phone]

## 2018-04-25 ENCOUNTER — APPOINTMENT (OUTPATIENT)
Dept: CARDIOLOGY | Facility: CLINIC | Age: 33
End: 2018-04-25
Attending: INTERNAL MEDICINE
Payer: COMMERCIAL

## 2018-04-25 ENCOUNTER — APPOINTMENT (OUTPATIENT)
Dept: OCCUPATIONAL THERAPY | Facility: CLINIC | Age: 33
End: 2018-04-25
Payer: COMMERCIAL

## 2018-04-25 LAB
ANION GAP SERPL CALCULATED.3IONS-SCNC: 7 MMOL/L (ref 3–14)
BUN SERPL-MCNC: 6 MG/DL (ref 7–30)
CALCIUM SERPL-MCNC: 8.5 MG/DL (ref 8.5–10.1)
CHLORIDE SERPL-SCNC: 105 MMOL/L (ref 94–109)
CO2 SERPL-SCNC: 25 MMOL/L (ref 20–32)
CREAT SERPL-MCNC: 0.77 MG/DL (ref 0.66–1.25)
ERYTHROCYTE [DISTWIDTH] IN BLOOD BY AUTOMATED COUNT: 12.8 % (ref 10–15)
GFR SERPL CREATININE-BSD FRML MDRD: >90 ML/MIN/1.7M2
GLUCOSE SERPL-MCNC: 121 MG/DL (ref 70–99)
HCT VFR BLD AUTO: 41.6 % (ref 40–53)
HGB BLD-MCNC: 15 G/DL (ref 13.3–17.7)
INTERPRETATION ECG - MUSE: NORMAL
LACTATE BLD-SCNC: 1.3 MMOL/L (ref 0.7–2)
MCH RBC QN AUTO: 32.3 PG (ref 26.5–33)
MCHC RBC AUTO-ENTMCNC: 36.1 G/DL (ref 31.5–36.5)
MCV RBC AUTO: 90 FL (ref 78–100)
PLATELET # BLD AUTO: 340 10E9/L (ref 150–450)
POTASSIUM SERPL-SCNC: 3.9 MMOL/L (ref 3.4–5.3)
RBC # BLD AUTO: 4.65 10E12/L (ref 4.4–5.9)
SODIUM SERPL-SCNC: 137 MMOL/L (ref 133–144)
WBC # BLD AUTO: 13 10E9/L (ref 4–11)

## 2018-04-25 PROCEDURE — 25000132 ZZH RX MED GY IP 250 OP 250 PS 637: Performed by: INTERNAL MEDICINE

## 2018-04-25 PROCEDURE — 85027 COMPLETE CBC AUTOMATED: CPT

## 2018-04-25 PROCEDURE — 40000275 ZZH STATISTIC RCP TIME EA 10 MIN

## 2018-04-25 PROCEDURE — 93010 ELECTROCARDIOGRAM REPORT: CPT | Performed by: INTERNAL MEDICINE

## 2018-04-25 PROCEDURE — 97535 SELF CARE MNGMENT TRAINING: CPT | Mod: GO | Performed by: OCCUPATIONAL THERAPIST

## 2018-04-25 PROCEDURE — 93306 TTE W/DOPPLER COMPLETE: CPT

## 2018-04-25 PROCEDURE — 40000133 ZZH STATISTIC OT WARD VISIT: Performed by: OCCUPATIONAL THERAPIST

## 2018-04-25 PROCEDURE — 36415 COLL VENOUS BLD VENIPUNCTURE: CPT

## 2018-04-25 PROCEDURE — 25000132 ZZH RX MED GY IP 250 OP 250 PS 637

## 2018-04-25 PROCEDURE — 93005 ELECTROCARDIOGRAM TRACING: CPT

## 2018-04-25 PROCEDURE — 21000000 ZZH R&B IMCU HEART CARE

## 2018-04-25 PROCEDURE — 99233 SBSQ HOSP IP/OBS HIGH 50: CPT | Mod: 25 | Performed by: INTERNAL MEDICINE

## 2018-04-25 PROCEDURE — 83605 ASSAY OF LACTIC ACID: CPT | Performed by: INTERNAL MEDICINE

## 2018-04-25 PROCEDURE — 97110 THERAPEUTIC EXERCISES: CPT | Mod: GO | Performed by: OCCUPATIONAL THERAPIST

## 2018-04-25 PROCEDURE — 94762 N-INVAS EAR/PLS OXIMTRY CONT: CPT

## 2018-04-25 PROCEDURE — 36415 COLL VENOUS BLD VENIPUNCTURE: CPT | Performed by: INTERNAL MEDICINE

## 2018-04-25 PROCEDURE — 25500064 ZZH RX 255 OP 636: Performed by: INTERNAL MEDICINE

## 2018-04-25 PROCEDURE — 80048 BASIC METABOLIC PNL TOTAL CA: CPT

## 2018-04-25 PROCEDURE — 93306 TTE W/DOPPLER COMPLETE: CPT | Mod: 26 | Performed by: INTERNAL MEDICINE

## 2018-04-25 RX ORDER — LISINOPRIL 2.5 MG/1
2.5 TABLET ORAL DAILY
Status: DISCONTINUED | OUTPATIENT
Start: 2018-04-26 | End: 2018-04-27 | Stop reason: HOSPADM

## 2018-04-25 RX ADMIN — TICAGRELOR 90 MG: 90 TABLET ORAL at 17:27

## 2018-04-25 RX ADMIN — HUMAN ALBUMIN MICROSPHERES AND PERFLUTREN 9 ML: 10; .22 INJECTION, SOLUTION INTRAVENOUS at 09:52

## 2018-04-25 RX ADMIN — ATORVASTATIN CALCIUM 80 MG: 80 TABLET, FILM COATED ORAL at 21:53

## 2018-04-25 RX ADMIN — ASPIRIN 81 MG: 81 TABLET, COATED ORAL at 09:25

## 2018-04-25 RX ADMIN — TICAGRELOR 90 MG: 90 TABLET ORAL at 04:46

## 2018-04-25 RX ADMIN — METOPROLOL SUCCINATE 25 MG: 25 TABLET, EXTENDED RELEASE ORAL at 09:24

## 2018-04-25 NOTE — PROVIDER NOTIFICATION
"Pt reporting 5/10 discomfort to left side of chest below nipple line. Pt reports pain was intermittent but is now constant and describes it as \"sensation after getting stiches in\". Denies SOB, nausea, lightheadedness, dizziness. EKG done. O2 applied at 2L. Spoke with Dr. Gregory and updated him. Dr. Gregory seen EKG. Received telephone order with readback for Morphine 2mg IVP PRN Q6H and give dose now. Notify Dr. Gregory if pain does not resolve.    "

## 2018-04-25 NOTE — PROGRESS NOTES
[facility]  Cardiology Progress Note    Date of Service (when I saw the patient): 04/25/2018  Primary Cardiologist: Dr. Dempsey       Assessment & Plan   Alonso Kan is a 32 year old male who was admitted on 4/24/2018 with shortness of breath, nausea and vomiting. EKG confirmed inferior STEMI resulting in emergent angiogram and stenting of the RCA. He carries no past medical history. He does have a family history of early coronary artery disease.     1.  : STEMI  - s/p ASHVIN/ Prox RCA, mild disease to LAD/Circ  - Echo confirms:  The left ventricle is normal in size.  The visual ejection fraction is estimated at 50-55%.  There is moderate to severe inferior and inferolateral wall hypokinesis.  No significant valvular heart disease  - EKG confirms sinus tachycardia with rate of 100  - Brilinta (min of 1 year), aspirin (lifelong), BB, ACE, Statin.  - Trops trending down, peak > 200  - Blood pressure stable  99/67  - Creatinine stable 0.77, other labs stable  - Cardiac rehab  - Anticipate discharge Friday.      Interval History   No cardiac complaints this am, denies chest pain, shortness of breath, palpitations, LE edema, PND or orthopnea. Mild CP last evening with elevated HR.     Physical Exam   Temp: 98.2  F (36.8  C) Temp src: Oral BP: 99/67   Heart Rate: 114 Resp: 16 SpO2: 98 % O2 Device: None (Room air) Oxygen Delivery: 2 LPM  Vitals:    04/24/18 0337 04/24/18 0549 04/25/18 0500   Weight: 81.6 kg (180 lb) 78.8 kg (173 lb 12.8 oz) 77.8 kg (171 lb 9.6 oz)     Vital Signs with Ranges  Temp:  [97.6  F (36.4  C)-98.4  F (36.9  C)] 98.2  F (36.8  C)  Heart Rate:  [] 114  Resp:  [16-31] 16  BP: ()/() 99/67  SpO2:  [98 %-100 %] 98 %  I/O last 3 completed shifts:  In: 1420 [P.O.:1000; I.V.:420]  Out: 800 [Urine:800]    GEN:  In general, this is a well nourished male in no acute distress.  Patient ambulatory.  HEENT:  Pupils equal, round. Sclerae nonicteric. Clear oropharynx.  Mucous membranes moist.  NECK: Supple, no masses appreciated. Trachea midline. No JVD   C/V:  Regular rhythm, tachycardic, no murmur, rub or gallop. No S3 or RV heave.   RESP: Respirations are unlabored. No use of accessory muscles. Clear to auscultation bilaterally without wheezing, rales, or rhonchi.  GI: Abdomen soft, nontender, nondistended.   EXTREM: No LE edema. No cyanosis or clubbing.  NEURO: Alert and oriented, cooperative. No obvious focal deficits.   PSYCH: Normal affect.  SKIN: Warm and dry. No rashes or petechiae appreciated.       Medications     Percutaneous Coronary Intervention orders placed (this is information for BPA alerting)         aspirin EC  81 mg Oral Daily     atorvastatin  80 mg Oral Daily     lisinopril  5 mg Oral Daily     metoprolol succinate  25 mg Oral Daily     ticagrelor  90 mg Oral BID       Data   Results for orders placed or performed during the hospital encounter of 04/24/18 (from the past 24 hour(s))   Troponin I (STAT q4h x3)   Result Value Ref Range    Troponin I ES >200.000 (HH) 0.000 - 0.045 ug/L   ALT   Result Value Ref Range     (H) 0 - 70 U/L   AST   Result Value Ref Range     (HH) 0 - 45 U/L   EKG 12-lead, tracing only   Result Value Ref Range    Interpretation ECG Click View Image link to view waveform and result    Troponin I (STAT q4h x3)   Result Value Ref Range    Troponin I .541 (HH) 0.000 - 0.045 ug/L   Potassium   Result Value Ref Range    Potassium 3.9 3.4 - 5.3 mmol/L   Troponin I (STAT q4h x3)   Result Value Ref Range    Troponin I .152 (HH) 0.000 - 0.045 ug/L   EKG 12-lead, tracing only   Result Value Ref Range    Interpretation ECG Click View Image link to view waveform and result    Troponin I (STAT)   Result Value Ref Range    Troponin I ES 99.331 (HH) 0.000 - 0.045 ug/L   Basic metabolic panel   Result Value Ref Range    Sodium 137 133 - 144 mmol/L    Potassium 3.9 3.4 - 5.3 mmol/L    Chloride 105 94 - 109 mmol/L    Carbon  Dioxide 25 20 - 32 mmol/L    Anion Gap 7 3 - 14 mmol/L    Glucose 121 (H) 70 - 99 mg/dL    Urea Nitrogen 6 (L) 7 - 30 mg/dL    Creatinine 0.77 0.66 - 1.25 mg/dL    GFR Estimate >90 >60 mL/min/1.7m2    GFR Estimate If Black >90 >60 mL/min/1.7m2    Calcium 8.5 8.5 - 10.1 mg/dL   CBC with platelets   Result Value Ref Range    WBC 13.0 (H) 4.0 - 11.0 10e9/L    RBC Count 4.65 4.4 - 5.9 10e12/L    Hemoglobin 15.0 13.3 - 17.7 g/dL    Hematocrit 41.6 40.0 - 53.0 %    MCV 90 78 - 100 fl    MCH 32.3 26.5 - 33.0 pg    MCHC 36.1 31.5 - 36.5 g/dL    RDW 12.8 10.0 - 15.0 %    Platelet Count 340 150 - 450 10e9/L   EKG 12-lead, tracing only   Result Value Ref Range    Interpretation ECG Click View Image link to view waveform and result          Mariposa Kelley, FARHANA CNP 4/25/2018

## 2018-04-25 NOTE — PLAN OF CARE
Problem: Patient Care Overview  Goal: Plan of Care/Patient Progress Review  Outcome: No Change  Tele SR/ST. HR 's. Other VS stable this shift. Pt had left chest/flank discomfort which resolved with PRN Morphine IVP. Pain has not reoccurred this shift. Transfers/ambulates independently. Reports mild SOB after walking in the halls. Resolves after resting. Statins on hold due to elevated liver enzymes. Plan for cardiac rehab and echo today 4/25. Continue to monitor.

## 2018-04-25 NOTE — CONSULTS
"NUTRITION EDUCATION    REASON FOR ASSESSMENT:  Nutrition education on American Heart Association (AHA) Heart Healthy Diet.    NUTRITION HISTORY:  Visited with pt this morning  He is from [facility]  Lives alone and does his own shopping/cooking  Pt is a vegetarian  Does cooking daily from scratch - very limited packaged foods  Eats mainly beans/legumes, fruits, veggies  He does consume some dairy (yogurt)  Does not eat pasta  Will have whole grain bread - \"buy it from whole foods, less processed\"  Pt cooks with a small amount of ghee and peanut oil  Does not drink sugared beverages  States he may have dessert once a month    Visitors have brought pt food in from home      CURRENT DIET ORDER:  Vegetarian, low fat    NUTRITION DIAGNOSIS:  Food- and nutrition-related knowledge deficit R/t no prior diet teaching on heart healthy diet AEB this is a new dx for pt    INTERVENTIONS:  Nutrition Prescription:    Recommended AHA Heart Healthy Diet    Implementation:     Nutrition Education (Content):  a) Verbally reviewed AHA Heart Healthy Diet guidelines    Nutrition Education (Application):  a) Discussed current eating habits and recommended food choices - pt already eating a fairly balanced diet.  He did have specific diet questions that we discussed.     Anticipate good compliance    Diet Education - refer to Education flowsheet    Goals:    Patient verbalizes understanding of diet     All of the above goals met during education session    Follow Up/Monitoring:    Provided RD contact information for future questions        "

## 2018-04-25 NOTE — PROGRESS NOTES
Dr. Gregory called for update on pt and notified that pt is pain free at this time. Dr. Gregory put in order for STAT Troponin. Notified that HR is in the low 100's. No other new orders at this time.

## 2018-04-25 NOTE — PLAN OF CARE
Problem: Patient Care Overview  Goal: Plan of Care/Patient Progress Review  Discharge Planner OT   Patient plan for discharge: home  Current status: Pt seen for two sessions today.  Walked 13 minutes on the treadmill, increased speed up to 1.8 MPH.  Initial AM /80, .  Ending /66, .  High HR in the 120-125 range.  Ambulated 15 minutes on the treadmill at 2.0 MPH, climbed 15 stairs in PM session.  Initial PM /79, .  Ending /85, .  Pt rated exercise 2/10 both sessions.  Also answered questions about CR progression and risk factors in PM session.  Barriers to return to prior living situation: MI precautions, no family nearby.   Recommendations for discharge: home with A with IADL's as needed, ie per MD driving, heavier cleaning and OP CR at [facility].   Rationale for recommendations: OP CR for monitored progressive exercise and risk factor education and modification.        Entered by: Travis Ortiz 04/25/2018 2:11 PM

## 2018-04-26 ENCOUNTER — APPOINTMENT (OUTPATIENT)
Dept: OCCUPATIONAL THERAPY | Facility: CLINIC | Age: 33
End: 2018-04-26
Payer: COMMERCIAL

## 2018-04-26 LAB
ALBUMIN SERPL-MCNC: 3.4 G/DL (ref 3.4–5)
ALP SERPL-CCNC: 91 U/L (ref 40–150)
ALT SERPL W P-5'-P-CCNC: 84 U/L (ref 0–70)
AST SERPL W P-5'-P-CCNC: 141 U/L (ref 0–45)
BILIRUB DIRECT SERPL-MCNC: 0.2 MG/DL (ref 0–0.2)
BILIRUB SERPL-MCNC: 1.3 MG/DL (ref 0.2–1.3)
ERYTHROCYTE [DISTWIDTH] IN BLOOD BY AUTOMATED COUNT: 12.2 % (ref 10–15)
HCT VFR BLD AUTO: 39.8 % (ref 40–53)
HGB BLD-MCNC: 14.2 G/DL (ref 13.3–17.7)
INTERPRETATION ECG - MUSE: NORMAL
INTERPRETATION ECG - MUSE: NORMAL
MCH RBC QN AUTO: 32 PG (ref 26.5–33)
MCHC RBC AUTO-ENTMCNC: 35.7 G/DL (ref 31.5–36.5)
MCV RBC AUTO: 90 FL (ref 78–100)
PLATELET # BLD AUTO: 356 10E9/L (ref 150–450)
POTASSIUM SERPL-SCNC: 4 MMOL/L (ref 3.4–5.3)
PROT SERPL-MCNC: 7.5 G/DL (ref 6.8–8.8)
RBC # BLD AUTO: 4.44 10E12/L (ref 4.4–5.9)
TSH SERPL DL<=0.005 MIU/L-ACNC: 2 MU/L (ref 0.4–4)
WBC # BLD AUTO: 11.9 10E9/L (ref 4–11)
WBC # BLD AUTO: 11.9 10E9/L (ref 4–11)

## 2018-04-26 PROCEDURE — 21000000 ZZH R&B IMCU HEART CARE

## 2018-04-26 PROCEDURE — 97110 THERAPEUTIC EXERCISES: CPT | Mod: GO | Performed by: OCCUPATIONAL THERAPIST

## 2018-04-26 PROCEDURE — 25000132 ZZH RX MED GY IP 250 OP 250 PS 637

## 2018-04-26 PROCEDURE — 84132 ASSAY OF SERUM POTASSIUM: CPT | Performed by: INTERNAL MEDICINE

## 2018-04-26 PROCEDURE — 84443 ASSAY THYROID STIM HORMONE: CPT | Performed by: INTERNAL MEDICINE

## 2018-04-26 PROCEDURE — 25000132 ZZH RX MED GY IP 250 OP 250 PS 637: Performed by: INTERNAL MEDICINE

## 2018-04-26 PROCEDURE — 85027 COMPLETE CBC AUTOMATED: CPT | Performed by: INTERNAL MEDICINE

## 2018-04-26 PROCEDURE — 40000133 ZZH STATISTIC OT WARD VISIT: Performed by: OCCUPATIONAL THERAPIST

## 2018-04-26 PROCEDURE — 36415 COLL VENOUS BLD VENIPUNCTURE: CPT | Performed by: INTERNAL MEDICINE

## 2018-04-26 PROCEDURE — 99233 SBSQ HOSP IP/OBS HIGH 50: CPT | Performed by: INTERNAL MEDICINE

## 2018-04-26 PROCEDURE — 80076 HEPATIC FUNCTION PANEL: CPT | Performed by: INTERNAL MEDICINE

## 2018-04-26 RX ADMIN — LISINOPRIL 2.5 MG: 2.5 TABLET ORAL at 09:52

## 2018-04-26 RX ADMIN — ASPIRIN 81 MG: 81 TABLET, COATED ORAL at 09:51

## 2018-04-26 RX ADMIN — TICAGRELOR 90 MG: 90 TABLET ORAL at 05:45

## 2018-04-26 RX ADMIN — METOPROLOL SUCCINATE 25 MG: 25 TABLET, EXTENDED RELEASE ORAL at 09:52

## 2018-04-26 RX ADMIN — ATORVASTATIN CALCIUM 80 MG: 80 TABLET, FILM COATED ORAL at 20:07

## 2018-04-26 RX ADMIN — TICAGRELOR 90 MG: 90 TABLET ORAL at 20:07

## 2018-04-26 NOTE — PROGRESS NOTES
Pt is on Overnight oximetry study with no oxygen. RT/RN will continue to monitor the pt throughout the night.     RT Nimo.

## 2018-04-26 NOTE — PLAN OF CARE
Problem: Patient Care Overview  Goal: Plan of Care/Patient Progress Review  Outcome: Improving  Today: ECHO done. EF 50-55%. Up with cardiac rehab, walked for 25 minutes.    Plan: overnight oximetry 4/25/18; nutrition consult; hepatic panel in morning (if AST still elevated RUQ ultrasound and hepatitis serology needed). D/C Friday (trop peak very high). May transfer to [facility].    CNS: A&O, no pain, indep. Sore thighs from rehab walking.   CVS: Sinus tachycardia in 90to 120's. SBP low at times in 90's. No peripheral edema.   RESP: RA. Overnight oximetry tonight.   GI: low fat low NA diet.  : voids indep.   Mobility: indep  Skin: left radial site dry and intact, no concerns.     Has fruit in fridge in interdisciplinary charting room.

## 2018-04-26 NOTE — PROGRESS NOTES
[facility]  Cardiology Progress Note    Date of Service (when I saw the patient): 04/26/2018  Primary Cardiologist: Dr. Dempsey       Assessment & Plan   Alonso Kan is a 32 year old male who was admitted on 4/24/2018 with shortness of breath, nausea and vomiting. EKG confirmed inferior STEMI resulting in emergent angiogram and stenting of the RCA. He carries no past medical history. He does have a family history of early coronary artery disease.     1.  : STEMI  - s/p ASHVIN/ Prox RCA, mild disease to LAD/Circ  - Echo confirms:  The left ventricle is normal in size.  The visual ejection fraction is estimated at 50-55%.  There is moderate to severe inferior and inferolateral wall hypokinesis.  No significant valvular heart disease  - Tele confirms sinus tachycardia with rate of 100  - Brilinta (min of 1 year), aspirin (lifelong), BB, ACE, Statin.  - ALT/AST elevation now trending down.   - Trops trending down, peak > 200  - Blood pressure stable  109/78, Lisinopril decreased to 2.5mg daily  - Cardiac rehab today  - Plan for discharge in am      Interval History   No cardiac complaints this am, denies chest pain, shortness of breath, palpitations, LE edema, PND or orthopnea. Tolerated cardiac rehab this am. Multiple questions answered regarding heart disease and treatment with verbal understanding.     Physical Exam   Temp: 98.4  F (36.9  C) Temp src: Oral BP: 109/78   Heart Rate: 108 Resp: 16 SpO2: 96 % O2 Device: None (Room air)    Vitals:    04/24/18 0549 04/25/18 0500 04/26/18 0546   Weight: 78.8 kg (173 lb 12.8 oz) 77.8 kg (171 lb 9.6 oz) 79.5 kg (175 lb 3.2 oz)     Vital Signs with Ranges  Temp:  [98.1  F (36.7  C)-98.7  F (37.1  C)] 98.4  F (36.9  C)  Heart Rate:  [101-110] 108  Resp:  [16-18] 16  BP: ()/(58-85) 109/78  SpO2:  [96 %-99 %] 96 %  I/O last 3 completed shifts:  In: 1000 [P.O.:1000]  Out: -     GEN:  In general, this is a well nourished male in no acute distress.   Patient ambulatory.  HEENT:  Pupils equal, round. Sclerae nonicteric. Clear oropharynx. Mucous membranes moist.  NECK: Supple, no masses appreciated. Trachea midline. No JVD   C/V:  Regular rhythm, tachycardic, no murmur, rub or gallop. No S3 or RV heave.   RESP: Respirations are unlabored. No use of accessory muscles. Clear to auscultation bilaterally without wheezing, rales, or rhonchi.  GI: Abdomen soft, nontender, nondistended.   EXTREM: No LE edema. No cyanosis or clubbing.  NEURO: Alert and oriented, cooperative. No obvious focal deficits.   PSYCH: Normal affect.  SKIN: Warm and dry. No rashes or petechiae appreciated.       Medications     Percutaneous Coronary Intervention orders placed (this is information for BPA alerting)         aspirin EC  81 mg Oral Daily     atorvastatin  80 mg Oral Daily     lisinopril  2.5 mg Oral Daily     metoprolol succinate  25 mg Oral Daily     ticagrelor  90 mg Oral BID       Data   Results for orders placed or performed during the hospital encounter of 18 (from the past 24 hour(s))   ECHO COMPLETE WITH OPTISON    Narrative    [id number]  [facility]  [id number]  [id number]^KYLIE^GANESH^A           [facility]  Echocardiography Laboratory  [address] [place] [address]        Name: DALTON MERAZ  MRN: [id number]  : 1985  Study Date: 2018 09:39 AM  Age: 32 yrs  Gender: Male  Patient Location: [facility]  Reason For Study: MI  Ordering Physician: GANESH ESPINAL  Referring Physician: [facility]  Performed By: Abelardo Ojeda RDCS     BSA: 1.9 m2  Height: 67 in  Weight: 171 lb  HR: 89  BP: 109/73 mmHg  _____________________________________________________________________________  __        Procedure  Complete Portable Echo Adult. Contrast Optison.  _____________________________________________________________________________  __        Interpretation Summary     The left ventricle is normal in size.  The visual  ejection fraction is estimated at 50-55%.  There is moderate to severe inferior and inferolateral wall hypokinesis.  No significant valvular heart disease.  _____________________________________________________________________________  __        Left Ventricle  The left ventricle is normal in size. There is normal left ventricular wall  thickness. The visual ejection fraction is estimated at 50-55%. Left  ventricular diastolic function is indeterminate. There is moderate to severe  inferior and inferolateral wall hypokinesis.     Right Ventricle  The right ventricle is normal in size and function.     Atria  Normal left atrial size. Right atrial size is normal. There is no color  Doppler evidence of an atrial shunt.     Mitral Valve  The mitral valve leaflets are mildly thickened. There is trace mitral  regurgitation.        Tricuspid Valve  There is trace tricuspid regurgitation. Normal IVC (1.5-2.5cm) with >50%  respiratory collapse; right atrial pressure is estimated at 5-10mmHg.     Aortic Valve  There is trivial trileaflet aortic sclerosis. No aortic regurgitation is  present.     Pulmonic Valve  There is trace pulmonic valvular regurgitation.     Vessels  Normal size aorta. The aortic root is normal size.     Pericardium  There is no pericardial effusion.        Rhythm  Sinus rhythm was noted.  _____________________________________________________________________________  __  MMode/2D Measurements & Calculations  IVSd: 0.98 cm     LVIDd: 4.6 cm  LVIDs: 3.9 cm  LVPWd: 1.2 cm  FS: 14.6 %  LV mass(C)d: 174.8 grams  LV mass(C)dI: 92.4 grams/m2  Ao root diam: 3.0 cm  LA dimension: 3.8 cm  asc Aorta Diam: 2.9 cm  LA/Ao: 1.3  LA Volume (BP): 30.2 ml  LA Volume Index (BP): 16.0 ml/m2  RWT: 0.51           Doppler Measurements & Calculations  MV E max geovanni: 78.2 cm/sec  MV A max geovanni: 57.2 cm/sec  MV E/A: 1.4  MV dec time: 0.18 sec  PA acc time: 0.11 sec  E/E' av.0  Lateral E/e': 8.8  Medial E/e': 13.2            _____________________________________________________________________________  __           Report approved by: Lucas Mckay 04/25/2018 10:15 AM      Lactic acid level STAT   Result Value Ref Range    Lactic Acid 1.3 0.7 - 2.0 mmol/L   Hepatic panel   Result Value Ref Range    Bilirubin Direct 0.2 0.0 - 0.2 mg/dL    Bilirubin Total 1.3 0.2 - 1.3 mg/dL    Albumin 3.4 3.4 - 5.0 g/dL    Protein Total 7.5 6.8 - 8.8 g/dL    Alkaline Phosphatase 91 40 - 150 U/L    ALT 84 (H) 0 - 70 U/L     (H) 0 - 45 U/L         FARHANA Escobar CNP 4/25/2018

## 2018-04-26 NOTE — PLAN OF CARE
Problem: Patient Care Overview  Goal: Plan of Care/Patient Progress Review  Outcome: Improving  Pt walked with rehab twice today.   Liver enzymes improved today, no hepatic follow up before discharge.  Overnight oximetry negative for OG.   No CP, no SOB. Mild nausea with meals, declined medication.  Still tachycardic in 100's to 120's; TSH normal, WBC only slightly elevated (11.9), hemoglobin normal. Afebrile. Asymptomatic.     PLAN: discharge tomorrow AM.     CNS: A&O X 4, independent. No pain.   CVS: ST in 100's to 120's. No peripheral edema.  RESP: RA  GI: vegetarian cardiac diet. Lots of food in pt's room; has fruit in [employer] fridge in office.   : voiding indep.  Mobility: no concerns.  Skin: Left radial incision CDI.

## 2018-04-26 NOTE — PLAN OF CARE
Problem: Patient Care Overview  Goal: Plan of Care/Patient Progress Review  Discharge Planner OT   Patient plan for discharge: home  Current status: Pt seen for two sessions today.  Walked 20 minutes on the treadmill, increased speed up to 2 MPH.  Initial AM /79, .  Ending /85, .  High HR in the 120-125 range.  Ambulated 23 minutes on the treadmill at 2.2 MPH, climbed 30 stairs in PM session.  Initial PM /74, .  Ending /78, .  Pt rated exercise 4/10 both sessions.    Barriers to return to prior living situation: MI precautions, no family nearby.   Recommendations for discharge: home with A with IADL's as needed, ie per MD driving, heavier cleaning and OP CR at [facility].   Rationale for recommendations: OP CR for monitored progressive exercise and risk factor education and modification.        Entered by: Travis Ortiz 04/26/2018 4:09 PM

## 2018-04-26 NOTE — PLAN OF CARE
Problem: Cardiac: ACS (Acute Coronary Syndrome) (Adult)  Goal: Signs and Symptoms of Listed Potential Problems Will be Absent, Minimized or Managed (Cardiac: ACS)  Signs and symptoms of listed potential problems will be absent, minimized or managed by discharge/transition of care (reference Cardiac: ACS (Acute Coronary Syndrome) (Adult) CPG).   Outcome: No Change  Pt alert, oriented and able to initiate needs.  Remains independent in room with transfers and cares.  Pt denies any discomfort.  Vitals remain stable although pt HR is SR/ST with rates mostly in low 100's.  Standing scale wt this am did increase to 175.2lb.  No edema noted and pt lungs sound clear/diminished.  No further issues noted.

## 2018-04-26 NOTE — DISCHARGE INSTRUCTIONS
Cardiac Angioplasty/Stent Discharge Instructions - Radial    After you go home:      Have an adult stay with you until tomorrow.    Drink extra fluids for 2 days.    You may resume your normal diet.    No smoking       For 24 hours - due to the sedation you received:    Relax and take it easy.    Do NOT make any important or legal decisions.    Do NOT drive or operate machines at home or at work.    Do NOT drink alcohol.    Care of Wrist Puncture Site:      For the first 24 hrs - check the puncture site every 1-2 hours while awake.    It is normal to have soreness at the puncture site and mild tingling in your hand for up to 3 days.    Remove the bandaid after 24 hours. If there is minor oozing, apply another bandaid and remove it after 12 hours.    You may shower tomorrow.  Do NOT take a bath, or use a hot tub or pool for at least 3 days. Do NOT scrub the site. Do not use lotion or powder near the puncture site.           Activity:          For 2 days:     do not use your hand or arm to support your weight (such as rising from a chair)     do not bend your wrist (such as lifting a garage door).    do not lift more than 5 pounds or exercise your arm (such as tennis, golf or bowling).    Do NOT do any heavy activity such as exercise, lifting, or straining.     Bleeding:      If you start bleeding from the site in your wrist, sit down and press firmly on/above the site for 10 minutes.     Once bleeding stops, keep arm still for 2 hours.     Call [facility] as soon as you can.       Call 911 right away if you have heavy bleeding or bleeding that does not stop.      Medicines:      If you are taking antiplatelet medications such as Plavix, Brilinta or Effient, do not stop taking it until you talk to your cardiologist.        If you are on Metformin (Glucophage), do not restart it until you have blood tests (within 2 to 3 days after discharge).  After you have your blood drawn, you may restart the Metformin.     Take  your medications, including blood thinners, unless your provider tells you not to.  If you take Coumadin (Warfarin), have your INR checked by your provider in  3-5 days. Call your clinic to schedule this.    If you have stopped any medicines, check with your provider about when to restart them.    Follow Up Appointments:      Follow up with [facility] Nurse Practitioner at [facility] of patient preference in 7-10 days.    Cardiac Rehab will contact you for follow up care.    Call the clinic if:      You have a large or growing hard lump around the site.    The site is red, swollen, hot or tender.    Blood or fluid is draining from the site.    You have chills or a fever greater than 101 F (38 C).    Your arm feels numb, cool or changes color.    You have hives, a rash or unusual itching.    Any questions or concerns.    Other Instructions:      If you received a stent - carry your stent card with you at all times.      [facility] at [place]:    [phone] [facility] (7 days a week)

## 2018-04-27 ENCOUNTER — APPOINTMENT (OUTPATIENT)
Dept: OCCUPATIONAL THERAPY | Facility: CLINIC | Age: 33
End: 2018-04-27
Payer: COMMERCIAL

## 2018-04-27 VITALS
HEIGHT: 67 IN | TEMPERATURE: 98.7 F | SYSTOLIC BLOOD PRESSURE: 121 MMHG | BODY MASS INDEX: 27.5 KG/M2 | RESPIRATION RATE: 12 BRPM | OXYGEN SATURATION: 97 % | WEIGHT: 175.2 LBS | DIASTOLIC BLOOD PRESSURE: 90 MMHG

## 2018-04-27 LAB — INTERPRETATION ECG - MUSE: NORMAL

## 2018-04-27 PROCEDURE — 25000132 ZZH RX MED GY IP 250 OP 250 PS 637: Performed by: INTERNAL MEDICINE

## 2018-04-27 PROCEDURE — 99238 HOSP IP/OBS DSCHRG MGMT 30/<: CPT | Performed by: INTERNAL MEDICINE

## 2018-04-27 PROCEDURE — 40000133 ZZH STATISTIC OT WARD VISIT: Performed by: OCCUPATIONAL THERAPIST

## 2018-04-27 PROCEDURE — 97110 THERAPEUTIC EXERCISES: CPT | Mod: GO | Performed by: OCCUPATIONAL THERAPIST

## 2018-04-27 PROCEDURE — 25000132 ZZH RX MED GY IP 250 OP 250 PS 637

## 2018-04-27 RX ORDER — ATORVASTATIN CALCIUM 80 MG/1
80 TABLET, FILM COATED ORAL DAILY
Qty: 30 TABLET | Refills: 3 | Status: SHIPPED | OUTPATIENT
Start: 2018-04-27 | End: 2018-05-03

## 2018-04-27 RX ORDER — METOPROLOL SUCCINATE 25 MG/1
25 TABLET, EXTENDED RELEASE ORAL DAILY
Qty: 30 TABLET | Refills: 3 | Status: SHIPPED | OUTPATIENT
Start: 2018-04-28 | End: 2018-05-03

## 2018-04-27 RX ORDER — LISINOPRIL 2.5 MG/1
2.5 TABLET ORAL DAILY
Qty: 30 TABLET | Refills: 3 | Status: SHIPPED | OUTPATIENT
Start: 2018-04-28 | End: 2018-05-03

## 2018-04-27 RX ORDER — NITROGLYCERIN 0.4 MG/1
TABLET SUBLINGUAL
Qty: 25 TABLET | Refills: 0 | Status: SHIPPED | OUTPATIENT
Start: 2018-04-27 | End: 2020-10-21

## 2018-04-27 RX ADMIN — LISINOPRIL 2.5 MG: 2.5 TABLET ORAL at 09:12

## 2018-04-27 RX ADMIN — METOPROLOL SUCCINATE 25 MG: 25 TABLET, EXTENDED RELEASE ORAL at 09:12

## 2018-04-27 RX ADMIN — ASPIRIN 81 MG: 81 TABLET, COATED ORAL at 09:11

## 2018-04-27 RX ADMIN — TICAGRELOR 90 MG: 90 TABLET ORAL at 09:12

## 2018-04-27 NOTE — PLAN OF CARE
Problem: Patient Care Overview  Goal: Plan of Care/Patient Progress Review  Discharge Planner OT   Patient plan for discharge: home  Current status: Pt seen for AM session.  Walked 22 minutes on the treadmill, increased speed up to 2.2 MPH.  Initial AM /78, .  Ending /77, .  High HR in the 120-125 range.  Ambulated 275 feet from room to satellite and back.  No complaints  Barriers to return to prior living situation: MI precautions, no family nearby.   Recommendations for discharge: home with A with IADL's as needed, ie per MD driving, heavier cleaning and OP CR at [facility].   Rationale for recommendations: OP CR for monitored progressive exercise and risk factor education and modification.        Entered by: Travis Ortiz 04/27/2018 10:26 AM         Occupational Therapy Discharge Summary    Reason for therapy discharge:    Discharged to home with outpatient therapy.    Progress towards therapy goal(s). See goals on Care Plan in [facility] electronic health record for goal details.  Goals met    Therapy recommendation(s):    Continued therapy is recommended.  Rationale/Recommendations:  Recommend continued CR in Phase II setting to increase endurance and learn about a heart healthy lifestyle.

## 2018-04-27 NOTE — PLAN OF CARE
Problem: Cardiac: ACS (Acute Coronary Syndrome) (Adult)  Goal: Signs and Symptoms of Listed Potential Problems Will be Absent, Minimized or Managed (Cardiac: ACS)  Signs and symptoms of listed potential problems will be absent, minimized or managed by discharge/transition of care (reference Cardiac: ACS (Acute Coronary Syndrome) (Adult) CPG).   Outcome: Improving  Pt A+O, RA, VSS with continuing asymptomatic sinus tach. Denies pain/CP/SOB. Up independently with steady gait. L radial site WDL, CMS intact. Expected discharge today. Continue to monitor.

## 2018-04-27 NOTE — PLAN OF CARE
Problem: Cardiac: ACS (Acute Coronary Syndrome) (Adult)  Goal: Signs and Symptoms of Listed Potential Problems Will be Absent, Minimized or Managed (Cardiac: ACS)  Signs and symptoms of listed potential problems will be absent, minimized or managed by discharge/transition of care (reference Cardiac: ACS (Acute Coronary Syndrome) (Adult) CPG).   Outcome: Adequate for Discharge Date Met: 04/27/18  Pt. Discharged to home.  Family took home.  No q's or concerns.  Paperwork and meds given.  No further questions, wrist intact.

## 2018-04-27 NOTE — DISCHARGE SUMMARY
[facility]    Discharge Summary  Cardiology    Date of Admission:  4/24/2018  Date of Discharge:  4/27/2018  Discharging Provider: FARHANA Escobar CNP    Discharge Diagnoses   1.  : STEMI  - s/p ASHVIN/ Prox RCA, mild disease to LAD/Circ  - Echo confirms:  The left ventricle is normal in size.  The visual ejection fraction is estimated at 50-55%.  There is moderate to severe inferior and inferolateral wall hypokinesis.  No significant valvular heart disease  - Tele confirms sinus tachycardia with rate of 100  - Brilinta (min of 1 year), aspirin (lifelong), BB, ACE, Statin.   - Trops trending down, peak > 200  - Blood pressure stable  108/62   - Cardiac rehab  - Wrist precautions reviewed with verbal understanding  - Follow up in 1 week with cardiology CATHY, appointments scheduled.   - OK to discharge per cardiology.      2.  Hyperlipidemia  - LDL not at goal 162, HDL 35, Triglycerides 213, Total cholesterol 240  - High dose statin  - follow up lipids in 6 weeks    History of Present Illness   Alonso Kan is an 32 year old male who was admitted on 4/24/2018 with shortness of breath, nausea and vomiting. EKG confirmed inferior STEMI resulting in emergent angiogram and stenting of the RCA. He carries no past medical history. He does have a family history of early coronary artery disease.     Hospital Course   Alonso Kan was admitted on 4/24/2018 with an inferior wall STEMI. He subsequently underwent emergent angiogram with stenting to the RCA. His troponin level peaked > 200.  Follow up echo confirmed and EF of 50-55%, moderate to severe inferior/inferiorlateral wall hypokinesis, no significant valvular disease. He remained tachycardiac during his hospital stay ranging from 100-120. He does admit to anxiety.  Blood pressures were borderline low 96/58 initially post PCI, improved after lisinopril was decreased. He will discharge on Brilinta, metoprolol, lisinopril, aspirin, and statin. He  denies any chest pain, shortness of breath, palpitations, PND, orthopnea, syncope or LE edema. Left radial access site healed. He is stable to discharge home. He is scheduled to follow up in the Heart clinic in 1 week.         FARHANA Escobar CNP    Significant Results and Procedures   Angiogram s/p PCI to RCA      Code Status   Full Code    Primary Care Physician   Pierre Bateman [facility]    Physical Exam   Temp: 98.7  F (37.1  C) Temp src: Oral BP: 121/90   Heart Rate: 100 Resp: 12 SpO2: 97 % O2 Device: None (Room air)    Vitals:    04/24/18 0549 04/25/18 0500 04/26/18 0546   Weight: 78.8 kg (173 lb 12.8 oz) 77.8 kg (171 lb 9.6 oz) 79.5 kg (175 lb 3.2 oz)     Vital Signs with Ranges  Temp:  [98.3  F (36.8  C)-98.7  F (37.1  C)] 98.7  F (37.1  C)  Heart Rate:  [100-120] 100  Resp:  [12-17] 12  BP: (106-121)/(62-90) 121/90  SpO2:  [96 %-98 %] 97 %  I/O last 3 completed shifts:  In: 1900 [P.O.:1900]  Out: -     GEN:  In general, this is a well nourished male in no acute distress.  Patient ambulatory.  HEENT:  Pupils equal, round. Sclerae nonicteric. Clear oropharynx. Mucous membranes moist.  NECK: Supple, no masses appreciated. Trachea midline. No JVD   C/V:  Regular rhythm, tachycardic, no murmur, rub or gallop. No S3 or RV heave.   RESP: Respirations are unlabored. No use of accessory muscles. Clear to auscultation bilaterally without wheezing, rales, or rhonchi.  GI: Abdomen soft, nontender, nondistended.   EXTREM: No LE edema. No cyanosis or clubbing.  NEURO: Alert and oriented, cooperative. No obvious focal deficits.   PSYCH: Normal affect.  SKIN: Warm and dry. No rashes or petechiae appreciated.        Time Spent on this Encounter   I, Mariposa Kelley, personally saw the patient today and spent less than or equal to 30 minutes discharging this patient.    Discharge Disposition   Discharged to home  Condition at discharge: Stable    Consultations This Hospital Stay   CARDIAC REHAB IP  CONSULT  NUTRITION SERVICES ADULT IP CONSULT  PHARMACY IP CONSULT  PHARMACY IP CONSULT  CARDIOLOGY IP CONSULT  PHARMACY LIAISON FOR MEDICATION COVERAGE CONSULT  PHARMACY IP CONSULT  CARDIAC REHAB IP CONSULT  SMOKING CESSATION PROGRAM IP CONSULT    Discharge Orders     CARDIAC REHAB REFERRAL     Med Therapy Manage Referral       Discharge Medications   There are no discharge medications for this patient.    Allergies   No Known Allergies  Data   Most Recent 3 CBC's:  Recent Labs   Lab Test  04/26/18   1445  04/25/18   0527  04/24/18   0355   WBC  11.9*  11.9*  13.0*  11.5*   HGB  14.2  15.0  15.3   MCV  90  90  88   PLT  356  340  381      Most Recent 3 BMP's:  Recent Labs   Lab Test  04/26/18   0522  04/25/18   0527  04/24/18   1430  04/24/18   0355   NA   --   137   --   137   POTASSIUM  4.0  3.9  3.9  2.8*   CHLORIDE   --   105   --   103   CO2   --   25   --   21   BUN   --   6*   --   8   CR   --   0.77   --   0.80   ANIONGAP   --   7   --   13   JANNIE   --   8.5   --   8.4*   GLC   --   121*   --   162*     Most Recent 2 LFT's:  Recent Labs   Lab Test  04/26/18   0522  04/24/18   1015   AST  141*  652*   ALT  84*  150*   ALKPHOS  91   --    BILITOTAL  1.3   --        Most Recent 3 Troponin's:  Recent Labs   Lab Test  04/24/18   2225  04/24/18   1825  04/24/18   1430   TROPI  99.331*  164.152*  181.541*     Most Recent Cholesterol Panel:  Recent Labs   Lab Test  04/24/18   0355   CHOL  240*   LDL  162*   HDL  35*   TRIG  213*     Most Recent TSH, T4 and A1c Labs:  Recent Labs   Lab Test  04/26/18   1445   TSH  2.00         Mariposa Kelley, FARHANA CNP 4/27/2018

## 2018-04-27 NOTE — PLAN OF CARE
Problem: Patient Care Overview  Goal: Plan of Care/Patient Progress Review  Outcome: Improving  A&O.VSS. Tele NSR-St.   Veg/cardiac diet. Up indpt. Denies pain-  States constipation has cleared since last nt.   Plan:  Dc today to home.  Wrist intact.

## 2018-04-30 ENCOUNTER — CARE COORDINATION (OUTPATIENT)
Dept: CARDIOLOGY | Facility: CLINIC | Age: 33
End: 2018-04-30

## 2018-04-30 NOTE — PROGRESS NOTES
Patient was evaluated by cardiology while inpatient for STEMI-ASHVIN to Prox RCA. Called patient and left VM to discuss any post hospital d/c questions he may have, review medication changes, and confirm f/u appts. RN in VM asked if patient was discharged on Brilinta and if not RN asked pt call RN back. Asked pt to call with new onset of SOB, chest pain, light headedness or for any concerns regarding cardiac cath site (left radial). RN confirmed in  that he has an apt scheduled on 5/8/18 for Cardiac Rehab and on 5/9/18 with CATHY Mariposa Hernandez with lab prior. Patient advised to call clinic with any cardiac related questions or concerns prior to his apt..

## 2018-05-03 ENCOUNTER — OFFICE VISIT (OUTPATIENT)
Dept: FAMILY MEDICINE | Facility: CLINIC | Age: 33
End: 2018-05-03
Payer: COMMERCIAL

## 2018-05-03 ENCOUNTER — ALLIED HEALTH/NURSE VISIT (OUTPATIENT)
Dept: PHARMACY | Facility: CLINIC | Age: 33
End: 2018-05-03
Attending: INTERNAL MEDICINE
Payer: COMMERCIAL

## 2018-05-03 VITALS
HEART RATE: 102 BPM | SYSTOLIC BLOOD PRESSURE: 106 MMHG | HEIGHT: 67 IN | DIASTOLIC BLOOD PRESSURE: 75 MMHG | WEIGHT: 178.4 LBS | BODY MASS INDEX: 28 KG/M2 | OXYGEN SATURATION: 96 % | TEMPERATURE: 99.2 F

## 2018-05-03 DIAGNOSIS — I21.3 ST ELEVATION MYOCARDIAL INFARCTION (STEMI), UNSPECIFIED ARTERY (H): Primary | ICD-10-CM

## 2018-05-03 DIAGNOSIS — I21.11 STEMI INVOLVING RIGHT CORONARY ARTERY (H): Primary | ICD-10-CM

## 2018-05-03 DIAGNOSIS — Z82.49 FAMILY HISTORY OF ISCHEMIC HEART DISEASE: ICD-10-CM

## 2018-05-03 DIAGNOSIS — I25.10 CORONARY ARTERY DISEASE DUE TO LIPID RICH PLAQUE: ICD-10-CM

## 2018-05-03 DIAGNOSIS — E78.5 HYPERLIPIDEMIA, UNSPECIFIED HYPERLIPIDEMIA TYPE: ICD-10-CM

## 2018-05-03 DIAGNOSIS — I25.83 CORONARY ARTERY DISEASE DUE TO LIPID RICH PLAQUE: ICD-10-CM

## 2018-05-03 DIAGNOSIS — E78.5 HYPERLIPIDEMIA LDL GOAL <70: ICD-10-CM

## 2018-05-03 PROCEDURE — 99495 TRANSJ CARE MGMT MOD F2F 14D: CPT | Performed by: INTERNAL MEDICINE

## 2018-05-03 PROCEDURE — 99605 MTMS BY PHARM NP 15 MIN: CPT | Performed by: PHARMACIST

## 2018-05-03 RX ORDER — LISINOPRIL 2.5 MG/1
2.5 TABLET ORAL DAILY
Qty: 90 TABLET | Refills: 3 | Status: SHIPPED | OUTPATIENT
Start: 2018-05-03 | End: 2019-05-24

## 2018-05-03 RX ORDER — ATORVASTATIN CALCIUM 80 MG/1
80 TABLET, FILM COATED ORAL DAILY
Qty: 90 TABLET | Refills: 3 | Status: SHIPPED | OUTPATIENT
Start: 2018-05-03 | End: 2019-02-18

## 2018-05-03 RX ORDER — METOPROLOL SUCCINATE 25 MG/1
25 TABLET, EXTENDED RELEASE ORAL DAILY
Qty: 90 TABLET | Refills: 3 | Status: SHIPPED | OUTPATIENT
Start: 2018-05-03 | End: 2019-05-28

## 2018-05-03 NOTE — MR AVS SNAPSHOT
After Visit Summary   5/3/2018    Alonso Kan    MRN: [id number]           Patient Information     Date Of Birth          1985        Visit Information        Provider Department      5/3/2018 3:30 PM Christie Morales, [facility] MTM        Today's Diagnoses     STEMI involving right coronary artery (H)    -  1    Hyperlipidemia, unspecified hyperlipidemia type          Care Instructions    Recommendations from today's MTM visit:                                                    MTM (medication therapy management) is a service provided by a clinical pharmacist designed to help you get the most of out of your medicines.   Today we reviewed what your medicines are for, how to know if they are working, that your medicines are safe and how to make your medicine regimen as easy as possible.     1. Continue current medications and attend follow-ups as planned.    2. Please reach out if you have questions.    Next MTM visit: 1 month check-in    To schedule another MTM appointment, please call the clinic directly or you may call the MTM scheduling line at [phone] or toll-free at [phone].     My Clinical Pharmacist's contact information:                                                      It was a pleasure speaking with you.  Please feel free to contact me with any questions or concerns you have.      Christie Morales PharmD   MTM Pharmacist   Phone: [phone]    You may receive a survey about the MTM services you received.  I would appreciate your feedback to help me serve you better in the future. Please fill it out and return it when you can. Your comments will be anonymous.                    Follow-ups after your visit        Your next 10 appointments already scheduled     May 03, 2018  6:00 PM CDT   Office Visit with Jayne Shah MD   [facility]    [address] [facility] [id number] [phone]            Bring a current list of meds and any records pertaining to this visit. For Physicals, please bring immunization records and any forms needing to be filled out. Please arrive 10 minutes early to complete paperwork.            May 08, 2018  2:00 PM CDT   Cardiac Evaluation with  Cardiac Rehab 3   [facility] ([facility])    [address], [address]  [facility] [address]   [phone]            May 09, 2018  9:10 AM CDT   LAB with [facility]   [facility] [place] ([facility])    [address]  [facility] [address] [phone]           Please do not eat 10-12 hours before your appointment if you are coming in fasting for labs on lipids, cholesterol, or glucose (sugar). This does not apply to pregnant women. Water, hot tea and black coffee (with nothing added) are okay. Do not drink other fluids, diet soda or chew gum.            May 09, 2018 10:10 AM CDT   Return Discharge with FARHANA Escobar CNP   [facility] ([facility])    [address]  [facility] [address] [phone] OPT 2              Who to contact     If you have questions or need follow up information about today's clinic visit or your schedule please contact [facility] directly at [phone].  Normal or non-critical lab and imaging results will be communicated to you by MyChart, letter or phone within 4 business days after the clinic has received the results. If you do not hear from us within 7 days, please contact the clinic through MyChart or phone. If you have a critical or abnormal lab result, we will notify you by phone as soon as possible.  Submit refill requests through [employer] or call your pharmacy and they will forward the refill request to us. Please allow 3 business days for your refill to be completed.          Additional  "Information About Your Visit        MyChart Information     [employer] lets you send messages to your doctor, view your test results, renew your prescriptions, schedule appointments and more. To sign up, go to [email or web address] . Click on \"Log in\" on the left side of the screen, which will take you to the Welcome page. Then click on \"Sign up Now\" on the right side of the page.     You will be asked to enter the access code listed below, as well as some personal information. Please follow the directions to create your username and password.     Your access code is: [email or web address]  Expires: 2018  1:08 PM     Your access code will  in 90 days. If you need help or a new code, please call your [place] clinic or [phone].        Care EveryWhere ID     This is your Care EveryWhere ID. This could be used by other organizations to access your [facility] medical records  [id number]         Blood Pressure from Last 3 Encounters:   18 121/90   17 (!) 159/91    Weight from Last 3 Encounters:   18 175 lb 3.2 oz (79.5 kg)   17 193 lb (87.5 kg)              Today, you had the following     No orders found for display       Primary Care Provider Office Phone # Fax #    Pierre [facility] [phone] [address]  [facility] [address]        Equal Access to Services     SHARMIN HAYNES AH: Hadii stephanie ku hadasho Soomaali, waaxda luqadaha, qaybta kaalmada adeegyada, consuelo white . So [facility] [phone].    ATENCIÓN: Si habla español, tiene a garland disposición servicios gratuitos de asistencia lingüística. Llame al [phone].    We comply with applicable federal civil rights laws and [place] laws. We do not discriminate on the basis of race, color, national origin, age, disability, sex, sexual orientation, or gender identity.            Thank you!     Thank you for choosing [facility] RHEUMAT[facility] MTM  for your care. Our goal is " always to provide you with excellent care. Hearing back from our patients is one way we can continue to improve our services. Please take a few minutes to complete the written survey that you may receive in the mail after your visit with us. Thank you!             Your Updated Medication List - Protect others around you: Learn how to safely use, store and throw away your medicines at www.disposemymeds.org.          This list is accurate as of 5/3/18  4:14 PM.  Always use your most recent med list.                   Brand Name Dispense Instructions for use Diagnosis    aspirin 81 MG EC tablet     30 tablet    Take 1 tablet (81 mg) by mouth daily    ST elevation myocardial infarction (STEMI), unspecified artery (H)       atorvastatin 80 MG tablet    LIPITOR    30 tablet    Take 1 tablet (80 mg) by mouth daily    Hyperlipidemia LDL goal <70       lisinopril 2.5 MG tablet    PRINIVIL/Zestril    30 tablet    Take 1 tablet (2.5 mg) by mouth daily    ST elevation myocardial infarction (STEMI), unspecified artery (H)       metoprolol succinate 25 MG 24 hr tablet    TOPROL-XL    30 tablet    Take 1 tablet (25 mg) by mouth daily    ST elevation myocardial infarction (STEMI), unspecified artery (H)       nitroGLYcerin 0.4 MG sublingual tablet    NITROSTAT    25 tablet    For chest pain place 1 tablet under the tongue every 5 minutes for 3 doses. If symptoms persist 5 minutes after 1st dose call 911.    ST elevation myocardial infarction (STEMI), unspecified artery (H), Hyperlipidemia LDL goal <70       ticagrelor 90 MG tablet    BRILINTA    60 tablet    Take 1 tablet (90 mg) by mouth 2 times daily    ST elevation myocardial infarction (STEMI), unspecified artery (H)

## 2018-05-03 NOTE — PATIENT INSTRUCTIONS
Recommendations from today's MTM visit:                                                    MTM (medication therapy management) is a service provided by a clinical pharmacist designed to help you get the most of out of your medicines.   Today we reviewed what your medicines are for, how to know if they are working, that your medicines are safe and how to make your medicine regimen as easy as possible.     1. Continue current medications and attend follow-ups as planned.    2. Please reach out if you have questions.    Next MTM visit: 1 month check-in    To schedule another MTM appointment, please call the clinic directly or you may call the MTM scheduling line at [phone] or toll-free at [phone].     My Clinical Pharmacist's contact information:                                                      It was a pleasure speaking with you.  Please feel free to contact me with any questions or concerns you have.      Christie Morales PharmD   MTM Pharmacist   Phone: [phone]    You may receive a survey about the MTM services you received.  I would appreciate your feedback to help me serve you better in the future. Please fill it out and return it when you can. Your comments will be anonymous.

## 2018-05-03 NOTE — PROGRESS NOTES
SUBJECTIVE:   Alonso Kan is a 32 year old male who presents to clinic today for the following health issues:          Hospital Follow-up Visit:    Hospital/Nursing Home/IP Rehab Facility: [facility]  Date of Admission: 4-  Date of Discharge: 4-  Reason(s) for Admission: STEMI            Problems taking medications regularly:  None       Medication changes since discharge: None       Problems adhering to non-medication therapy:  None    Summary of hospitalization:  [facility] discharge summary reviewed  Diagnostic Tests/Treatments reviewed.  Follow up needed: cardiology  Other Healthcare Providers Involved in Patient s Care:         Specialist appointment - cardiology  Update since discharge: improved.     Post Discharge Medication Reconciliation: discharge medications reconciled and changed, per note/orders (see AVS).  Plan of care communicated with patient     Coding guidelines for this visit:  Type of Medical   Decision Making Face-to-Face Visit       within 7 Days of discharge Face-to-Face Visit        within 14 days of discharge   Moderate Complexity [id number]   High Complexity [id number]            HPI:   Patient Alonso Kan is a very pleasant 32 year old male with CAD with recent STEMI status post cardiac stent placement who presents to Internal Medicine clinic today for post hospital discharge follow up for recent hospitalization fro STEMI. Regarding the patient's coronary artery disease, the patient has been compliant with his outpatient cardiac medications. Patient has a strong positive family history of CAD. He denies any acute chest pains since hospital discharge. He is scheduled for follow up with outpatient cardiac rehab and cardiac specialist clinic later in 5/2018. Patient is due for a refill of multiple current medications including Lisinopril, Metoprolol and Atorvastatin at this time.           Current Medications:     Current Outpatient  Prescriptions   Medication Sig Dispense Refill     aspirin EC 81 MG EC tablet Take 1 tablet (81 mg) by mouth daily 30 tablet 3     atorvastatin (LIPITOR) 80 MG tablet Take 1 tablet (80 mg) by mouth daily 90 tablet 3     lisinopril (PRINIVIL/ZESTRIL) 2.5 MG tablet Take 1 tablet (2.5 mg) by mouth daily 90 tablet 3     metoprolol succinate (TOPROL-XL) 25 MG 24 hr tablet Take 1 tablet (25 mg) by mouth daily 90 tablet 3     nitroGLYcerin (NITROSTAT) 0.4 MG sublingual tablet For chest pain place 1 tablet under the tongue every 5 minutes for 3 doses. If symptoms persist 5 minutes after 1st dose call 911. 25 tablet 0     ticagrelor (BRILINTA) 90 MG tablet Take 1 tablet (90 mg) by mouth 2 times daily 60 tablet 11     [DISCONTINUED] atorvastatin (LIPITOR) 80 MG tablet Take 1 tablet (80 mg) by mouth daily 30 tablet 3     [DISCONTINUED] lisinopril (PRINIVIL/ZESTRIL) 2.5 MG tablet Take 1 tablet (2.5 mg) by mouth daily 30 tablet 3     [DISCONTINUED] metoprolol succinate (TOPROL-XL) 25 MG 24 hr tablet Take 1 tablet (25 mg) by mouth daily 30 tablet 3         Allergies:    No Known Allergies         Past Medical History:     Past Medical History:   Diagnosis Date     Coronary artery disease     STEMI 04/2018     Hyperlipidemia      STEMI (ST elevation myocardial infarction) (H)          Past Surgical History:   No past surgical history on file.      Family Medical History:     Family History   Problem Relation Age of Onset     Coronary Artery Disease Mother      Coronary Artery Disease Father          Social History:     Social History     Social History     Marital status: Single     Spouse name: N/A     Number of children: N/A     Years of education: N/A     Occupational History     Not on file.     Social History Main Topics     Smoking status: Never Smoker     Smokeless tobacco: Never Used     Alcohol use No     Drug use: No     Sexual activity: No     Other Topics Concern     Not on file     Social History Narrative  "          Review of System:     Constitutional: Negative for fever or chills  Skin: Negative for rashes  Ears/Nose/Throat: Negative for nasal congestion, sore throat  Respiratory: No shortness of breath, dyspnea on exertion, cough, or hemoptysis  Cardiovascular: Negative for chest pain. Positive for recent STEMI  Gastrointestinal: Negative for nausea, vomiting  Genitourinary: Negative for dysuria, hematuria  Musculoskeletal: Negative for myalgias  Neurologic: Negative for headaches  Psychiatric: Negative for depression, anxiety  Hematologic/Lymphatic/Immunologic: Negative  Endocrine: Negative  Behavioral: Negative for tobacco use       Physical Exam:   /75 (BP Location: Left arm, Cuff Size: Adult Large)  Pulse 102  Temp 99.2  F (37.3  C) (Oral)  Ht 5' 7\" (1.702 m)  Wt 178 lb 6.4 oz (80.9 kg)  SpO2 96%  BMI 27.94 kg/m2    GENERAL: alert and no distress  EYES: eyes grossly normal to inspection, and conjunctivae and sclerae normal  HENT: Normocephalic atraumatic. Nose and mouth without ulcers or lesions  NECK: supple  RESP: lungs clear to auscultation   CV: regular rate and rhythm, normal S1 S2  LYMPH: no peripheral edema   ABDOMEN: nondistended  MS: no gross musculoskeletal defects noted  SKIN: no suspicious lesions or rashes  NEURO: Alert & Oriented x 3.   PSYCH: mentation appears normal, affect normal        Diagnostic Test Results:     Diagnostic Test Results:  Results for orders placed or performed during the hospital encounter of 04/24/18   Chest  XR, 1 view PORTABLE    Narrative    XR CHEST PORT 1 VW   4/24/2018 4:12 AM     INDICATION: STEMI.    COMPARISON: None.      Impression    IMPRESSION: No focal airspace disease or other acute findings.  Normal-sized cardiac silhouette.     CHRISTINA CHAN MD   CBC with platelets + differential   Result Value Ref Range    WBC 11.5 (H) 4.0 - 11.0 10e9/L    RBC Count 4.74 4.4 - 5.9 10e12/L    Hemoglobin 15.3 13.3 - 17.7 g/dL    Hematocrit 41.7 40.0 - 53.0 %    " MCV 88 78 - 100 fl    MCH 32.3 26.5 - 33.0 pg    MCHC 36.7 (H) 31.5 - 36.5 g/dL    RDW 12.2 10.0 - 15.0 %    Platelet Count 381 150 - 450 10e9/L    Diff Method Automated Method     % Neutrophils 35.3 %    % Lymphocytes 52.6 %    % Monocytes 8.9 %    % Eosinophils 2.6 %    % Basophils 0.3 %    % Immature Granulocytes 0.3 %    Nucleated RBCs 0 0 /100    Absolute Neutrophil 4.1 1.6 - 8.3 10e9/L    Absolute Lymphocytes 6.1 (H) 0.8 - 5.3 10e9/L    Absolute Monocytes 1.0 0.0 - 1.3 10e9/L    Absolute Eosinophils 0.3 0.0 - 0.7 10e9/L    Absolute Basophils 0.0 0.0 - 0.2 10e9/L    Abs Immature Granulocytes 0.0 0 - 0.4 10e9/L    Absolute Nucleated RBC 0.0    Basic metabolic panel   Result Value Ref Range    Sodium 137 133 - 144 mmol/L    Potassium 2.8 (L) 3.4 - 5.3 mmol/L    Chloride 103 94 - 109 mmol/L    Carbon Dioxide 21 20 - 32 mmol/L    Anion Gap 13 3 - 14 mmol/L    Glucose 162 (H) 70 - 99 mg/dL    Urea Nitrogen 8 7 - 30 mg/dL    Creatinine 0.80 0.66 - 1.25 mg/dL    GFR Estimate >90 >60 mL/min/1.7m2    GFR Estimate If Black >90 >60 mL/min/1.7m2    Calcium 8.4 (L) 8.5 - 10.1 mg/dL   Troponin I (now)   Result Value Ref Range    Troponin I ES <0.015 0.000 - 0.045 ug/L   D dimer quantitative   Result Value Ref Range    D Dimer <0.3 0.0 - 0.50 ug/ml FEU   Lipid Profile   Result Value Ref Range    Cholesterol 240 (H) <200 mg/dL    Triglycerides 213 (H) <150 mg/dL    HDL Cholesterol 35 (L) >39 mg/dL    LDL Cholesterol Calculated 162 (H) <100 mg/dL    Non HDL Cholesterol 205 (H) <130 mg/dL   Troponin I (STAT q4h x3)   Result Value Ref Range    Troponin I ES >200.000 (HH) 0.000 - 0.045 ug/L   Troponin I (STAT q4h x3)   Result Value Ref Range    Troponin I .541 (HH) 0.000 - 0.045 ug/L   Troponin I (STAT q4h x3)   Result Value Ref Range    Troponin I .152 (HH) 0.000 - 0.045 ug/L   ALT   Result Value Ref Range     (H) 0 - 70 U/L   AST   Result Value Ref Range     (HH) 0 - 45 U/L   Potassium   Result Value  Ref Range    Potassium 3.9 3.4 - 5.3 mmol/L   Troponin I (STAT)   Result Value Ref Range    Troponin I ES 99.331 (HH) 0.000 - 0.045 ug/L   Basic metabolic panel   Result Value Ref Range    Sodium 137 133 - 144 mmol/L    Potassium 3.9 3.4 - 5.3 mmol/L    Chloride 105 94 - 109 mmol/L    Carbon Dioxide 25 20 - 32 mmol/L    Anion Gap 7 3 - 14 mmol/L    Glucose 121 (H) 70 - 99 mg/dL    Urea Nitrogen 6 (L) 7 - 30 mg/dL    Creatinine 0.77 0.66 - 1.25 mg/dL    GFR Estimate >90 >60 mL/min/1.7m2    GFR Estimate If Black >90 >60 mL/min/1.7m2    Calcium 8.5 8.5 - 10.1 mg/dL   CBC with platelets   Result Value Ref Range    WBC 13.0 (H) 4.0 - 11.0 10e9/L    RBC Count 4.65 4.4 - 5.9 10e12/L    Hemoglobin 15.0 13.3 - 17.7 g/dL    Hematocrit 41.6 40.0 - 53.0 %    MCV 90 78 - 100 fl    MCH 32.3 26.5 - 33.0 pg    MCHC 36.1 31.5 - 36.5 g/dL    RDW 12.8 10.0 - 15.0 %    Platelet Count 340 150 - 450 10e9/L   Lactic acid level STAT   Result Value Ref Range    Lactic Acid 1.3 0.7 - 2.0 mmol/L   Hepatic panel   Result Value Ref Range    Bilirubin Direct 0.2 0.0 - 0.2 mg/dL    Bilirubin Total 1.3 0.2 - 1.3 mg/dL    Albumin 3.4 3.4 - 5.0 g/dL    Protein Total 7.5 6.8 - 8.8 g/dL    Alkaline Phosphatase 91 40 - 150 U/L    ALT 84 (H) 0 - 70 U/L     (H) 0 - 45 U/L   Potassium   Result Value Ref Range    Potassium 4.0 3.4 - 5.3 mmol/L   TSH   Result Value Ref Range    TSH 2.00 0.40 - 4.00 mU/L   WBC count   Result Value Ref Range    WBC 11.9 (H) 4.0 - 11.0 10e9/L   CBC (1200)   Result Value Ref Range    WBC 11.9 (H) 4.0 - 11.0 10e9/L    RBC Count 4.44 4.4 - 5.9 10e12/L    Hemoglobin 14.2 13.3 - 17.7 g/dL    Hematocrit 39.8 (L) 40.0 - 53.0 %    MCV 90 78 - 100 fl    MCH 32.0 26.5 - 33.0 pg    MCHC 35.7 31.5 - 36.5 g/dL    RDW 12.2 10.0 - 15.0 %    Platelet Count 356 150 - 450 10e9/L   EKG 12 lead   Result Value Ref Range    Interpretation ECG Click View Image link to view waveform and result    EKG 12 lead   Result Value Ref Range     "Interpretation ECG Click View Image link to view waveform and result    EKG 12-lead, tracing only    Result Value Ref Range    Interpretation ECG Click View Image link to view waveform and result    EKG 12-lead, tracing only   Result Value Ref Range    Interpretation ECG Click View Image link to view waveform and result    EKG 12-lead, tracing only   Result Value Ref Range    Interpretation ECG Click View Image link to view waveform and result    EKG 12-lead, tracing only   Result Value Ref Range    Interpretation ECG Click View Image link to view waveform and result    Nutrition Services Adult IP Consult    Narrative    Geri Matos, LUCHO, LD     4/25/2018 11:28 AM  NUTRITION EDUCATION    REASON FOR ASSESSMENT:  Nutrition education on American Heart Association (AHA) Heart   Healthy Diet.    NUTRITION HISTORY:  Visited with pt this morning  He is from [facility]  Lives alone and does his own shopping/cooking  Pt is a vegetarian  Does cooking daily from scratch - very limited packaged foods  Eats mainly beans/legumes, fruits, veggies  He does consume some dairy (yogurt)  Does not eat pasta  Will have whole grain bread - \"buy it from whole foods, less   processed\"  Pt cooks with a small amount of ghee and peanut oil  Does not drink sugared beverages  States he may have dessert once a month    Visitors have brought pt food in from home      CURRENT DIET ORDER:  Vegetarian, low fat    NUTRITION DIAGNOSIS:  Food- and nutrition-related knowledge deficit R/t no prior diet   teaching on heart healthy diet AEB this is a new dx for pt    INTERVENTIONS:  Nutrition Prescription:  ? Recommended AHA Heart Healthy Diet    Implementation:  ?  Nutrition Education (Content):  a) Verbally reviewed AHA Heart Healthy Diet guidelines  ? Nutrition Education (Application):  a) Discussed current eating habits and recommended food choices -   pt already eating a fairly balanced diet.  He did have specific   diet questions that we discussed. "   ? Anticipate good compliance  ? Diet Education - refer to Education flowsheet    Goals:  ? Patient verbalizes understanding of diet   ? All of the above goals met during education session    Follow Up/Monitoring:  ? Provided RD contact information for future questions         Activated clotting time POCT   Result Value Ref Range    Activated Clot Time 245 (H) 75 - 150 sec   ECHO COMPLETE WITH OPTISON    Narrative    [id number]  [id number]  [id number]  [id number]^[id number]^[email or web address]^A           [facility]  Echocardiography Laboratory  [facility] [place] [address]        Name: DALTON MERAZ  MRN: [id number]  : 1985  Study Date: 2018 09:39 AM  Age: 32 yrs  Gender: Male  Patient Location: [facility]  Reason For Study: MI  Ordering Physician: GANESH ESPINAL  Referring Physician: [facility]  Performed By: Abelardo Ojeda RDCS     BSA: 1.9 m2  Height: 67 in  Weight: 171 lb  HR: 89  BP: 109/73 mmHg  _____________________________________________________________________________  __        Procedure  Complete Portable Echo Adult. Contrast Optison.  _____________________________________________________________________________  __        Interpretation Summary     The left ventricle is normal in size.  The visual ejection fraction is estimated at 50-55%.  There is moderate to severe inferior and inferolateral wall hypokinesis.  No significant valvular heart disease.  _____________________________________________________________________________  __        Left Ventricle  The left ventricle is normal in size. There is normal left ventricular wall  thickness. The visual ejection fraction is estimated at 50-55%. Left  ventricular diastolic function is indeterminate. There is moderate to severe  inferior and inferolateral wall hypokinesis.     Right Ventricle  The right ventricle is normal in size and function.     Atria  Normal left atrial size. Right atrial size is  normal. There is no color  Doppler evidence of an atrial shunt.     Mitral Valve  The mitral valve leaflets are mildly thickened. There is trace mitral  regurgitation.        Tricuspid Valve  There is trace tricuspid regurgitation. Normal IVC (1.5-2.5cm) with >50%  respiratory collapse; right atrial pressure is estimated at 5-10mmHg.     Aortic Valve  There is trivial trileaflet aortic sclerosis. No aortic regurgitation is  present.     Pulmonic Valve  There is trace pulmonic valvular regurgitation.     Vessels  Normal size aorta. The aortic root is normal size.     Pericardium  There is no pericardial effusion.        Rhythm  Sinus rhythm was noted.  _____________________________________________________________________________  __  MMode/2D Measurements & Calculations  IVSd: 0.98 cm     LVIDd: 4.6 cm  LVIDs: 3.9 cm  LVPWd: 1.2 cm  FS: 14.6 %  LV mass(C)d: 174.8 grams  LV mass(C)dI: 92.4 grams/m2  Ao root diam: 3.0 cm  LA dimension: 3.8 cm  asc Aorta Diam: 2.9 cm  LA/Ao: 1.3  LA Volume (BP): 30.2 ml  LA Volume Index (BP): 16.0 ml/m2  RWT: 0.51           Doppler Measurements & Calculations  MV E max geovanni: 78.2 cm/sec  MV A max geovanni: 57.2 cm/sec  MV E/A: 1.4  MV dec time: 0.18 sec  PA acc time: 0.11 sec  E/E' av.0  Lateral E/e': 8.8  Medial E/e': 13.2           _____________________________________________________________________________  __           Report approved by: Lucas Mckay 2018 10:15 AM      Heart Cath Left heart cath    Narrative    Procedures:  Coronary Angiography  PCI of the RCA  Left heart catheterization    PHYSICIANS:  Attending Physician: Dr. Swain  Interventional Cardiology Fellow: JOSE A Pa    HPI/INDICATION:  The patient is a 32 year-old [place] male with no prior past medical  history who presented to the emergency department tonight with  shortness of breath, nausea and vomiting. He first felt the shortness  of breath 1:11 PM last night and then he had some relief until he  went  to sleep about 3:00 in the morning. He woke up again with worsening  shortness of breath and came to the emergency department around 3:30.  His initial ECG showed signs of an inferior STEMI and he presented to  the Cath Lab emergently.    DESCRIPTION:  1. Consent obtained with discussion of risks. All questions were  answered.  2. Sterile prep and procedure.  3. Location with Sheaths:   6 [place] left radial artery  4. Access: Local anesthetic with lidocaine. A micropuncture 21-gauge  needle was used to establish vascular access using a modified  Seldinger technique.  5. Catheters:  MPA guide and ERAD left (unsuccessful left coronary engagement with JL  3.5, XB 3, AL 1, Nicola, Tig)  6. Estimated blood loss: < 25 ml    MEDICATIONS:  The procedure was performed under conscious sedation for 60?minutes  from 0440 to 0540.  The patient was assessed immediately before the first sedation  medication was administered. Midazolam 4mg and Fentanyl 100mcg?were  administered.  Heart rate, BP, respiration, oxygen saturation and patient responses  were monitored throughout the procedure with the assistance of the RN  under my supervision.  IV was administered to achieve anticoagulation.  Antiplatelet Therapy: ASA and Ticagrelor    Procedures:  Coronary Angiogram:   -Both coronary arteries arise from their respective cusps.  -Dominance: Right  -There was nonselective engagement of the left coronary artery given  the difficulty of engagement.  -LM is without angiographic evidence of disease.   -LAD: Type 3 [LAD supplies the entire apex]. The LAD gives rise to  septal perforators, D1 and D2. There is mild 20% disease in an mid  LAD.  -LCX gives rise to OM1 and OM2. There is mild nonflow limiting disease  in the circumflex system.   -RCA gives rise to PL branches and supplies PDA. There is an acute  thrombotic 100% occlusion of the proximal RCA. After revascularization  the patient has 30% disease in the distal RCA. There is mild  disease  in the RPDA and RPL system.    PCI:  MPA guide  Proximal RCA    A run through wire was advanced across the lesion and positioned in  the R PL. A 3.0 x 12mm balloon was used to pre-dilate the lesion. A  2.5 x 38mm Synergy ASHVIN was successfully deployed across the lesion  with inflation to 17atm. ?The stent was post-dilated with a 4.0 x 12mm  NC emerge balloon. Final angiography showed no evidence of perforation  or dissection with residual stenosis of 0% and FABIANA 3 flow. ?No  complications.    Left heart catheterization:  Left ventricular end-diastolic pressure 25 mmHg  No gradient on pullback across aortic valve    Sheath Removal:  Left radial artery sheath removed, TR band in place    Contrast: 180ml     Fluoroscopy Time: 18.3min; 1.256Gy    COMPLICATIONS:  None    SUMMARY:   RCA STEMI  PCI to the RCA with a Synergy ASHVIN    PLAN:   ASA 81 mg qd and Ticagrelor 90 mg BID  Initiated beta blocker, ACE inhibitor and atorvastatin 40 mg  Echo pending  Admitted to CCU  Continued medical management and lifestyle modification for  cardiovascular risk factor optimization.    The attending interventional cardiologist, Dr. Swain, was present and  supervised all critical aspects the procedure.    JOSE A Pa MD, PhD  Interventional Cardiology Fellow    I have personally reviewed the examination and initial interpretation  and I agree with the findings.    KATERINA SWAIN MD       ASSESSMENT/PLAN:       (I21.3) ST elevation myocardial infarction (STEMI), unspecified artery (H)  (primary encounter diagnosis)  (E78.5) Hyperlipidemia LDL goal <70  (I25.10,  I25.83) Coronary artery disease due to lipid rich plaque  (Z82.49) Family history of ischemic heart disease  Comment: No chest pain. Patient is due for follow up with cardiac clinic later this month. Patient has been compliant with his cardiac medications since hospital discharge. patient is due for refill of his cardiac medications including lisinopril and  metoprolol.   Plan: I have ordered refills of lisinopril (PRINIVIL/ZESTRIL) 2.5 MG tablet, metoprolol succinate (TOPROL-XL) 25 MG 24 hr tablet, and atorvastatin (LIPITOR) 80 MG tablet cardiac medications. patient is advised to continue to follow up with outpatient cardiology clinic going forward.        Follow Up Plan:     Patient is instructed to return to Internal Medicine clinic for follow-up visit in 3 months.        Jayne Shah MD  Internal Medicine  [facility]

## 2018-05-03 NOTE — MR AVS SNAPSHOT
After Visit Summary   5/3/2018    Alonso Kan    MRN: [id number]           Patient Information     Date Of Birth          1985        Visit Information        Provider Department      5/3/2018 6:00 PM Jayne Shah MD [facility]        Today's Diagnoses     ST elevation myocardial infarction (STEMI), unspecified artery (H)    -  1    Hyperlipidemia LDL goal <70        Coronary artery disease due to lipid rich plaque        Family history of ischemic heart disease           Follow-ups after your visit        Your next 10 appointments already scheduled     May 08, 2018  2:00 PM CDT   Cardiac Evaluation with  Cardiac Rehab 3   [facility] ([facility])    [address]  [facility] [address] [phone]            May 09, 2018  9:10 AM CDT   LAB with [facility]   [facility] [place] ([facility])    [address]  [facility] [address] [phone]           Please do not eat 10-12 hours before your appointment if you are coming in fasting for labs on lipids, cholesterol, or glucose (sugar). This does not apply to pregnant women. Water, hot tea and black coffee (with nothing added) are okay. Do not drink other fluids, diet soda or chew gum.            May 09, 2018 10:10 AM CDT   Return Discharge with FARHANA Escobar CNP   [facility] ([facility])    [address]  [facility] [address] [phone] OPT 2              Who to contact     If you have questions or need follow up information about today's clinic visit or your schedule please contact [facility] directly at [phone].  Normal or non-critical lab and imaging results will be communicated to you by MyChart, letter or phone within 4 business days after the clinic has received the results. If you do not hear from us within 7  "days, please contact the clinic through [employer] or phone. If you have a critical or abnormal lab result, we will notify you by phone as soon as possible.  Submit refill requests through [employer] or call your pharmacy and they will forward the refill request to us. Please allow 3 business days for your refill to be completed.          Additional Information About Your Visit        [facility] Information     [employer] lets you send messages to your doctor, view your test results, renew your prescriptions, schedule appointments and more. To sign up, go to [email or web address] . Click on \"Log in\" on the left side of the screen, which will take you to the Welcome page. Then click on \"Sign up Now\" on the right side of the page.     You will be asked to enter the access code listed below, as well as some personal information. Please follow the directions to create your username and password.     Your access code is: [email or web address]  Expires: 2018  1:08 PM     Your access code will  in 90 days. If you need help or a new code, please call your [place] clinic or [phone].        Care EveryWhere ID     This is your Care EveryWhere ID. This could be used by other organizations to access your [place] medical records  [id number]        Your Vitals Were     Pulse Temperature Height Pulse Oximetry BMI (Body Mass Index)       102 99.2  F (37.3  C) (Oral) 5' 7\" (1.702 m) 96% 27.94 kg/m2        Blood Pressure from Last 3 Encounters:   18 106/75   18 121/90   17 (!) 159/91    Weight from Last 3 Encounters:   18 178 lb 6.4 oz (80.9 kg)   18 175 lb 3.2 oz (79.5 kg)   17 193 lb (87.5 kg)              We Performed the Following     [facility] ACCESS CODE - Add PCP and Click on cosign on right          Where to get your medicines      These medications were sent to [facility], [place] - [address], [address]  [address], [address], [place] [address]     " Phone:  [phone]     atorvastatin 80 MG tablet    lisinopril 2.5 MG tablet    metoprolol succinate 25 MG 24 hr tablet          Primary Care Provider Office Phone # Fax #    [facility] [phone] [address] [place] [address]        Equal Access to Services     SHARMIN HAYNES : Hadii aad ku hadasho Soomaali, waaxda luqadaha, qaybta kaalmada adeegyada, waxay idiin hayaan adeeg khmichaelsh laannan . So [facility] [phone].    ATENCIÓN: Si habla español, tiene a garland disposición servicios gratuitos de asistencia lingüística. Llame al [phone].    We comply with applicable federal civil rights laws and [place] laws. We do not discriminate on the basis of race, color, national origin, age, disability, sex, sexual orientation, or gender identity.            Thank you!     Thank you for choosing [facility]  for your care. Our goal is always to provide you with excellent care. Hearing back from our patients is one way we can continue to improve our services. Please take a few minutes to complete the written survey that you may receive in the mail after your visit with us. Thank you!             Your Updated Medication List - Protect others around you: Learn how to safely use, store and throw away your medicines at www.disposemymeds.org.          This list is accurate as of 5/3/18  8:39 PM.  Always use your most recent med list.                   Brand Name Dispense Instructions for use Diagnosis    aspirin 81 MG EC tablet     30 tablet    Take 1 tablet (81 mg) by mouth daily    ST elevation myocardial infarction (STEMI), unspecified artery (H)       atorvastatin 80 MG tablet    LIPITOR    90 tablet    Take 1 tablet (80 mg) by mouth daily    Hyperlipidemia LDL goal <70       lisinopril 2.5 MG tablet    PRINIVIL/Zestril    90 tablet    Take 1 tablet (2.5 mg) by mouth daily    ST elevation myocardial infarction (STEMI), unspecified artery (H)       metoprolol succinate  25 MG 24 hr tablet    TOPROL-XL    90 tablet    Take 1 tablet (25 mg) by mouth daily    ST elevation myocardial infarction (STEMI), unspecified artery (H)       nitroGLYcerin 0.4 MG sublingual tablet    NITROSTAT    25 tablet    For chest pain place 1 tablet under the tongue every 5 minutes for 3 doses. If symptoms persist 5 minutes after 1st dose call 911.    ST elevation myocardial infarction (STEMI), unspecified artery (H), Hyperlipidemia LDL goal <70       ticagrelor 90 MG tablet    BRILINTA    60 tablet    Take 1 tablet (90 mg) by mouth 2 times daily    ST elevation myocardial infarction (STEMI), unspecified artery (H)

## 2018-05-03 NOTE — PROGRESS NOTES
SUBJECTIVE/OBJECTIVE:                Alonso Kan is a 32 year old male called for a transitions of care visit.  He was discharged from [facility] on 4/27 for STEMI.     Chief Complaint: No questions - ERIN visit    Allergies/ADRs: Reviewed in Epic  Tobacco: No tobacco use   Alcohol: not currently using    Medication Adherence/Access:  Medication barriers: none.     STEMI: Current therapy includes aspirin 81 mg daily, atorvastatin 80 mg daily, lisinopril 2.5 mg daily, metoprolol succinate 25 mg daily, and Brilinta 90 mg BID.  He also has NTG tablets available to use if needed, and he is comfortable using them as so. These are all new medications for him. Denies major chest pain, describes some discomfort, but he said nothing that would prevent him from doing his usual work. Denies side effects or signs/symptoms of bleeding.  BP at home 120//60 mmHg  142-130/80-90 mmHg    Hyperlipidemia: Current therapy includes atorvastatin 80 mg once daily.  Pt reports no significant myalgias or other side effects.     Current labs include:  BP Readings from Last 3 Encounters:   04/27/18 121/90   12/27/17 (!) 159/91     Lab Results   Component Value Date    CHOL 240 04/24/2018     Lab Results   Component Value Date    TRIG 213 04/24/2018     Lab Results   Component Value Date    HDL 35 04/24/2018     Lab Results   Component Value Date     04/24/2018     Liver Function Studies -   Recent Labs   Lab Test  04/26/18   0522   PROTTOTAL  7.5   ALBUMIN  3.4   BILITOTAL  1.3   ALKPHOS  91   AST  141*   ALT  84*     Last Basic Metabolic Panel:  Lab Results   Component Value Date     04/25/2018      Lab Results   Component Value Date    POTASSIUM 4.0 04/26/2018     Lab Results   Component Value Date    CHLORIDE 105 04/25/2018     Lab Results   Component Value Date    BUN 6 04/25/2018     Lab Results   Component Value Date    CR 0.77 04/25/2018     GFR Estimate   Date Value Ref Range Status   04/25/2018 >90 >60 mL/min/1.7m2 Final      Comment:     Non  GFR Calc   04/24/2018 >90 >60 mL/min/1.7m2 Final     Comment:     Non  GFR Calc     ASSESSMENT:                 Current medications were reviewed today.      Medication Adherence: excellent, no issues identified    STEMI: Improved. Patient is appropriately on ACE, BB, statin, and ASA (DAPT w/ Ticagrelor). Plan in place to follow-up with provider.    Hyperlipidemia: Improved. Pt is on high intensity statin which is indicated based on 2013 ACC/AHA guidelines for lipid management. LFTs may have been elevated due to recent NSTEMI.    PLAN:                1. Continue current medications and follow-up as planned.     I spent 15 minutes with this patient today. I offer these suggestions for consideration by the PCP. A copy of the visit note was provided to the patient's primary care provider.    Will follow up in 1 month.    The patient was mailed a summary of these recommendations as an after visit summary.    Christie Morales PharmD   [place] Pharmacist   Phone: [phone]

## 2018-05-09 ENCOUNTER — OFFICE VISIT (OUTPATIENT)
Dept: CARDIOLOGY | Facility: CLINIC | Age: 33
End: 2018-05-09
Attending: NURSE PRACTITIONER
Payer: COMMERCIAL

## 2018-05-09 VITALS
DIASTOLIC BLOOD PRESSURE: 74 MMHG | SYSTOLIC BLOOD PRESSURE: 110 MMHG | HEART RATE: 75 BPM | BODY MASS INDEX: 27.15 KG/M2 | HEIGHT: 67 IN | WEIGHT: 173 LBS

## 2018-05-09 DIAGNOSIS — I21.3 ST ELEVATION MYOCARDIAL INFARCTION (STEMI), UNSPECIFIED ARTERY (H): Primary | ICD-10-CM

## 2018-05-09 DIAGNOSIS — E78.5 HYPERLIPIDEMIA WITH TARGET LDL LESS THAN 70: ICD-10-CM

## 2018-05-09 DIAGNOSIS — I21.3 ST ELEVATION MYOCARDIAL INFARCTION (STEMI), UNSPECIFIED ARTERY (H): ICD-10-CM

## 2018-05-09 LAB
ANION GAP SERPL CALCULATED.3IONS-SCNC: 13 MMOL/L (ref 6–17)
BUN SERPL-MCNC: 6 MG/DL (ref 7–30)
CALCIUM SERPL-MCNC: 9.9 MG/DL (ref 8.5–10.5)
CHLORIDE SERPL-SCNC: 102 MMOL/L (ref 98–107)
CO2 SERPL-SCNC: 25 MMOL/L (ref 23–29)
CREAT SERPL-MCNC: 0.9 MG/DL (ref 0.7–1.3)
GFR SERPL CREATININE-BSD FRML MDRD: >90 ML/MIN/1.7M2
GLUCOSE SERPL-MCNC: 101 MG/DL (ref 70–105)
POTASSIUM SERPL-SCNC: 4 MMOL/L (ref 3.5–5.1)
SODIUM SERPL-SCNC: 136 MMOL/L (ref 136–145)

## 2018-05-09 PROCEDURE — 99214 OFFICE O/P EST MOD 30 MIN: CPT | Performed by: NURSE PRACTITIONER

## 2018-05-09 PROCEDURE — 80048 BASIC METABOLIC PNL TOTAL CA: CPT | Performed by: INTERNAL MEDICINE

## 2018-05-09 PROCEDURE — 36415 COLL VENOUS BLD VENIPUNCTURE: CPT | Performed by: INTERNAL MEDICINE

## 2018-05-09 NOTE — PROGRESS NOTES
"Cardiology Clinic Progress Note  Alonso Kan MRN# [id number]   YOB: 1985 Age: 32 year old     Reason For Visit:  Hospital discharge s/p STEMI   Primary Cardiologist:   Dr. Swain          History of Presenting Illness:    Alonso Kan is a pleasant 32 year old patient who was admitted on 4/24/2018 with shortness of breath, nausea and vomiting. EKG in the ER confirmed inferior STEMI resulting in emergent angiogram and stenting of the RCA. There was noted minimal disease (20%) in the LAD and PDA as well as mild no flow limiting disease in the circumflex. Follow up echo confirmed left ventricle is normal in size. The visual ejection fraction is estimated at 50-55%.There is moderate to severe inferior and inferolateral wall hypokinesis. No significant valvular heart disease.Prior to this admission, he had no past medical history with the excepiton of family history of early coronary artery disease.  He comes to the office today for his first outpatient follow up.     He is feeling well on a cardiac standpoint, denies chest pain, shortness of breath, PND, orthopnea, presyncope, syncope, edema, heart racing, or palpitations. His only complaint is mild soreness to left radial access site. He does mention a single episode of \"chest discomfort\" while walking and carrying a 3 month old child. The pain lasted for ~ an hour and then resolved, he did not use any PRN Nitro. He reports no further reoccurrence.  He does admit to having some anxiety.     Blood pressure is well controlled at 110/74, HR 75, managed well on metoprolol and lisinopril.   He plans to begin cardiac rehab in the next week. Unfortunately he had to cancel his initial appointment due to a work commitment.     He follows a heart healthy diet and makes all of his meals. He does not consume any meat and is very strict on grains and carbohydrates.   Compliant with all medications.                    Assessment and Plan:      (I21.3) ST elevation " "myocardial infarction (STEMI), unspecified artery (H)  (primary encounter diagnosis)  Comment: Stable, no symptoms of ischemia. LV function low normal 50-55%  Plan: Brilinta (min 1 year), aspirin (lifelong), BB, ACE, and statin.   Encourage cardiac rehab, continue with routine exercise regimen.      (E78.5) Hyperlipidemia with target LDL less than 70  Comment: LDL not at goal (162), started on high dose statin.  Plan: Due for follow up lipids in 4 weeks.   Continue on Lipitor.   Heart healthy diet.   Printed diet information given for reference.              Thank you for allowing me to participate in this delightful patient's care. He is scheduled to follow up with Dr. Swain to establish care.      FARHANA Escobar, CNP          Review of Systems:   Review of Systems:  Skin:  Negative     Eyes:  Negative    ENT:  Negative    Respiratory:  Positive for dyspnea on exertion  Cardiovascular:  Negative    Gastroenterology: Negative    Genitourinary:  Negative    Musculoskeletal:  Negative    Neurologic:  Negative    Psychiatric:  Negative    Heme/Lymph/Imm:  Negative    Endocrine:  Negative                Physical Exam:     Vitals: /74  Pulse 75  Ht 1.702 m (5' 7\")  Wt 78.5 kg (173 lb)  BMI 27.1 kg/m2  Constitutional:  cooperative, alert and oriented, well developed, well nourished, in no acute distress        Skin:  warm and dry to the touch, no apparent skin lesions or masses noted   Healing left radial scab, no hematoma or s/s of infection.     Head:  normocephalic, no masses or lesions        Eyes:  pupils equal and round, conjunctivae and lids unremarkable, sclera white, no xanthalasma, EOMS intact, no nystagmus        ENT:  no pallor or cyanosis, dentition good        Neck:  carotid pulses are full and equal bilaterally, JVP normal, no carotid bruit        Chest:  normal breath sounds, clear to auscultation, normal A-P diameter, normal symmetry, normal respiratory excursion, no use of accessory " muscles        Cardiac: regular rhythm, normal S1/S2, no S3 or S4, apical impulse not displaced, no murmurs, gallops or rubs     no presence of murmur            Abdomen:  BS normoactive;non-tender        Vascular:         2+           2+ 2+                Extremities and Back:  no deformities, clubbing, cyanosis, erythema observed;no edema        Neurological:  no gross motor deficits                 Medications:     Current Outpatient Prescriptions   Medication Sig Dispense Refill     aspirin EC 81 MG EC tablet Take 1 tablet (81 mg) by mouth daily 30 tablet 3     atorvastatin (LIPITOR) 80 MG tablet Take 1 tablet (80 mg) by mouth daily 90 tablet 3     lisinopril (PRINIVIL/ZESTRIL) 2.5 MG tablet Take 1 tablet (2.5 mg) by mouth daily 90 tablet 3     metoprolol succinate (TOPROL-XL) 25 MG 24 hr tablet Take 1 tablet (25 mg) by mouth daily 90 tablet 3     nitroGLYcerin (NITROSTAT) 0.4 MG sublingual tablet For chest pain place 1 tablet under the tongue every 5 minutes for 3 doses. If symptoms persist 5 minutes after 1st dose call 911. 25 tablet 0     ticagrelor (BRILINTA) 90 MG tablet Take 1 tablet (90 mg) by mouth 2 times daily 60 tablet 11           Family History   Problem Relation Age of Onset     Coronary Artery Disease Mother      Coronary Artery Disease Father        Social History     Social History     Marital status: Single     Spouse name: N/A     Number of children: N/A     Years of education: N/A     Occupational History     Not on file.     Social History Main Topics     Smoking status: Never Smoker     Smokeless tobacco: Never Used     Alcohol use No     Drug use: No     Sexual activity: No     Other Topics Concern     Not on file     Social History Narrative            Past Medical History:     Past Medical History:   Diagnosis Date     Coronary artery disease     STEMI 04/2018     Hyperlipidemia      STEMI (ST elevation myocardial infarction) (H)               Past Surgical History:   No past surgical  history on file.           Allergies:   Review of patient's allergies indicates no known allergies.       Data:   All laboratory data reviewed:    LAST CHOLESTEROL:  Lab Results   Component Value Date    CHOL 240 04/24/2018     Lab Results   Component Value Date    HDL 35 04/24/2018     Lab Results   Component Value Date     04/24/2018     Lab Results   Component Value Date    TRIG 213 04/24/2018     No results found for: CHOLHDLRATIO    LAST BMP:  Lab Results   Component Value Date     05/09/2018      Lab Results   Component Value Date    POTASSIUM 4.0 05/09/2018     Lab Results   Component Value Date    CHLORIDE 102 05/09/2018     Lab Results   Component Value Date    JANNIE 9.9 05/09/2018     Lab Results   Component Value Date    CO2 25 05/09/2018     Lab Results   Component Value Date    BUN 6 05/09/2018     Lab Results   Component Value Date    CR 0.90 05/09/2018     Lab Results   Component Value Date     05/09/2018       LAST CBC:  Lab Results   Component Value Date    WBC 11.9 04/26/2018    WBC 11.9 04/26/2018     Lab Results   Component Value Date    RBC 4.44 04/26/2018     Lab Results   Component Value Date    HGB 14.2 04/26/2018     Lab Results   Component Value Date    HCT 39.8 04/26/2018     Lab Results   Component Value Date    MCV 90 04/26/2018     Lab Results   Component Value Date    MCH 32.0 04/26/2018     Lab Results   Component Value Date    MCHC 35.7 04/26/2018     Lab Results   Component Value Date    RDW 12.2 04/26/2018     Lab Results   Component Value Date     04/26/2018         Mariposa Kelley, FARHANA, CNP

## 2018-05-09 NOTE — LETTER
"5/9/2018    [facility]  [address]  [place] [address]    RE: Alonso Kan       Dear Colleague,    I had the pleasure of seeing Alonso Kan in the [facility].    Cardiology Clinic Progress Note  Alonso Kan MRN# [id number]   YOB: 1985 Age: 32 year old     Reason For Visit:  Hospital discharge s/p STEMI   Primary Cardiologist:   Dr. Swain          History of Presenting Illness:    Alonso Kan is a pleasant 32 year old patient who was admitted on 4/24/2018 with shortness of breath, nausea and vomiting. EKG in the ER confirmed inferior STEMI resulting in emergent angiogram and stenting of the RCA. There was noted minimal disease (20%) in the LAD and PDA as well as mild no flow limiting disease in the circumflex. Follow up echo confirmed left ventricle is normal in size. The visual ejection fraction is estimated at 50-55%.There is moderate to severe inferior and inferolateral wall hypokinesis. No significant valvular heart disease.Prior to this admission, he had no past medical history with the excepiton of family history of early coronary artery disease.  He comes to the office today for his first outpatient follow up.     He is feeling well on a cardiac standpoint, denies chest pain, shortness of breath, PND, orthopnea, presyncope, syncope, edema, heart racing, or palpitations. His only complaint is mild soreness to left radial access site. He does mention a single episode of \"chest discomfort\" while walking and carrying a 3 month old child. The pain lasted for ~ an hour and then resolved, he did not use any PRN Nitro. He reports no further reoccurrence.  He does admit to having some anxiety.     Blood pressure is well controlled at 110/74, HR 75, managed well on metoprolol and lisinopril.   He plans to begin cardiac rehab in the next week. Unfortunately he had to cancel his initial appointment due to a work commitment.     He follows a " "heart healthy diet and makes all of his meals. He does not consume any meat and is very strict on grains and carbohydrates.   Compliant with all medications.                    Assessment and Plan:      (I21.3) ST elevation myocardial infarction (STEMI), unspecified artery (H)  (primary encounter diagnosis)  Comment: Stable, no symptoms of ischemia. LV function low normal 50-55%  Plan: Brilinta (min 1 year), aspirin (lifelong), BB, ACE, and statin.   Encourage cardiac rehab, continue with routine exercise regimen.      (E78.5) Hyperlipidemia with target LDL less than 70  Comment: LDL not at goal (162), started on high dose statin.  Plan: Due for follow up lipids in 4 weeks.   Continue on Lipitor.   Heart healthy diet.   Printed diet information given for reference.              Thank you for allowing me to participate in this delightful patient's care. He is scheduled to follow up with Dr. Swain to establish care.      FARHANA Escobar, CNP          Review of Systems:   Review of Systems:  Skin:  Negative     Eyes:  Negative    ENT:  Negative    Respiratory:  Positive for dyspnea on exertion  Cardiovascular:  Negative    Gastroenterology: Negative    Genitourinary:  Negative    Musculoskeletal:  Negative    Neurologic:  Negative    Psychiatric:  Negative    Heme/Lymph/Imm:  Negative    Endocrine:  Negative                Physical Exam:     Vitals: /74  Pulse 75  Ht 1.702 m (5' 7\")  Wt 78.5 kg (173 lb)  BMI 27.1 kg/m2  Constitutional:  cooperative, alert and oriented, well developed, well nourished, in no acute distress        Skin:  warm and dry to the touch, no apparent skin lesions or masses noted   Healing left radial scab, no hematoma or s/s of infection.     Head:  normocephalic, no masses or lesions        Eyes:  pupils equal and round, conjunctivae and lids unremarkable, sclera white, no xanthalasma, EOMS intact, no nystagmus        ENT:  no pallor or cyanosis, dentition good        Neck:  " carotid pulses are full and equal bilaterally, JVP normal, no carotid bruit        Chest:  normal breath sounds, clear to auscultation, normal A-P diameter, normal symmetry, normal respiratory excursion, no use of accessory muscles        Cardiac: regular rhythm, normal S1/S2, no S3 or S4, apical impulse not displaced, no murmurs, gallops or rubs     no presence of murmur            Abdomen:  BS normoactive;non-tender        Vascular:         2+           2+ 2+                Extremities and Back:  no deformities, clubbing, cyanosis, erythema observed;no edema        Neurological:  no gross motor deficits                 Medications:     Current Outpatient Prescriptions   Medication Sig Dispense Refill     aspirin EC 81 MG EC tablet Take 1 tablet (81 mg) by mouth daily 30 tablet 3     atorvastatin (LIPITOR) 80 MG tablet Take 1 tablet (80 mg) by mouth daily 90 tablet 3     lisinopril (PRINIVIL/ZESTRIL) 2.5 MG tablet Take 1 tablet (2.5 mg) by mouth daily 90 tablet 3     metoprolol succinate (TOPROL-XL) 25 MG 24 hr tablet Take 1 tablet (25 mg) by mouth daily 90 tablet 3     nitroGLYcerin (NITROSTAT) 0.4 MG sublingual tablet For chest pain place 1 tablet under the tongue every 5 minutes for 3 doses. If symptoms persist 5 minutes after 1st dose call 911. 25 tablet 0     ticagrelor (BRILINTA) 90 MG tablet Take 1 tablet (90 mg) by mouth 2 times daily 60 tablet 11           Family History   Problem Relation Age of Onset     Coronary Artery Disease Mother      Coronary Artery Disease Father        Social History     Social History     Marital status: Single     Spouse name: N/A     Number of children: N/A     Years of education: N/A     Occupational History     Not on file.     Social History Main Topics     Smoking status: Never Smoker     Smokeless tobacco: Never Used     Alcohol use No     Drug use: No     Sexual activity: No     Other Topics Concern     Not on file     Social History Narrative            Past Medical  History:     Past Medical History:   Diagnosis Date     Coronary artery disease     STEMI 04/2018     Hyperlipidemia      STEMI (ST elevation myocardial infarction) (H)               Past Surgical History:   No past surgical history on file.           Allergies:   Review of patient's allergies indicates no known allergies.       Data:   All laboratory data reviewed:    LAST CHOLESTEROL:  Lab Results   Component Value Date    CHOL 240 04/24/2018     Lab Results   Component Value Date    HDL 35 04/24/2018     Lab Results   Component Value Date     04/24/2018     Lab Results   Component Value Date    TRIG 213 04/24/2018     No results found for: CHOLHDLRATIO    LAST BMP:  Lab Results   Component Value Date     05/09/2018      Lab Results   Component Value Date    POTASSIUM 4.0 05/09/2018     Lab Results   Component Value Date    CHLORIDE 102 05/09/2018     Lab Results   Component Value Date    JANNIE 9.9 05/09/2018     Lab Results   Component Value Date    CO2 25 05/09/2018     Lab Results   Component Value Date    BUN 6 05/09/2018     Lab Results   Component Value Date    CR 0.90 05/09/2018     Lab Results   Component Value Date     05/09/2018       LAST CBC:  Lab Results   Component Value Date    WBC 11.9 04/26/2018    WBC 11.9 04/26/2018     Lab Results   Component Value Date    RBC 4.44 04/26/2018     Lab Results   Component Value Date    HGB 14.2 04/26/2018     Lab Results   Component Value Date    HCT 39.8 04/26/2018     Lab Results   Component Value Date    MCV 90 04/26/2018     Lab Results   Component Value Date    MCH 32.0 04/26/2018     Lab Results   Component Value Date    MCHC 35.7 04/26/2018     Lab Results   Component Value Date    RDW 12.2 04/26/2018     Lab Results   Component Value Date     04/26/2018       Thank you for allowing me to participate in the care of your patient.    Sincerely,     FARHANA Escobar [facility]

## 2018-05-09 NOTE — MR AVS SNAPSHOT
After Visit Summary   5/9/2018    Alonso Kan    MRN: [id number]           Patient Information     Date Of Birth          1985        Visit Information        Provider Department      5/9/2018 10:10 AM Mariposa Kelley APRN CNP [facility]   [place]        Today's Diagnoses     Hyperlipidemia with target LDL less than 70    -  1    ST elevation myocardial infarction (STEMI), unspecified artery (H)          Care Instructions    Thanks for coming into [facility] today.  Reviewed angiogram results  Labs normal today.  Continue with current medical therapy  Follow up in 4 weeks with cholesterol labs  Encourage cardiac rehab.  Heart healthy diet and exercise.   Follow up in 3 months to establish care with Dr. Swain           Please call my nurse at [phone] with any questions or concerns.    Scheduling phone number: [phone]  Reminder: Please bring in all current medications, over the counter supplements and vitamin bottles to your next appointment.        Tips for Heart-Healthy Cooking and Eating  Ten tips for cooking  1 Try low-fat cooking methods:    Bake    Broil    [place]    Microwave    Roast    Steam    Poach    Stir-huggins  2 Always use non-stick pans or silicone baking sheets. If you must oil a pan, heat the pan first. This way you will need less oil to cover the pan. Use a small amount of oil or liquid, such as:    Low-sodium broth    Olive oil    Canola oil    Non-stick cooking spray  3 Choose fish and lean cuts of meat, like:     Tenderloin    Top round    Short loin    Sirloin tip    Extra lean ground beef or turkey breast    Skinless poultry (chicken and turkey)  Trim fat from meat and poultry before preparing.   4 After meat is cooked, pour off the drippings and put them in the refrigerator. Once they have chilled, remove the hardened fat from the top. Use the leftover drippings for a homemade broth, gravy or marinade.  5  After making soup or stew, put it in the refrigerator. Once it has chilled, remove the hardened fat before reheating.  6 Baste meats with a fat-free liquid, such as:    Lemon juice    Low-sodium tomato juice    Low-sodium broth    Vinegar    Wine (avoid cooking wine, which can be high in sodium)  7 Try adding herbs and spices to flavor foods rather than butter, margarine or salt.   8 Avoid salty seasonings like bouillon cubes, chili sauce, seasoned salts, lemon pepper and soy sauce.  9 Choose fresh, frozen or canned vegetables (with no added salt).    When making packaged foods, use less fat than the directions call for. Some packages give lower-fat cooking options. Remember, most packaged foods are high in sodium.  Dining Out with Your Heart in Mind    Choose smaller portions.    Share an entree. Or eat half of your meal and take the rest home. Ask for a take-home container when the meal is served, then box half of your food before you start eating.    Eat fish, skinless poultry (chicken, turkey)   or other lean cuts of meat (sirloin, tenderloin   or filet).    Trim fat off meat and remove skin from poultry.    Ask for sauce, dressing, cream cheese, butter, margarine and peanut butter on the side.   Dip your food into these so you eat only   small amounts.    Choose grilled, steamed, broiled, poached or baked items. Avoid fried foods.    Limit foods that are buttered, creamed, sauteed, fried, crispy, escalloped or served in gravy.    Read the menu carefully. When in doubt, ask your  how the food is prepared.    Ask to replace french fries and potato chips with fruits or vegetables.    Use mustard instead of mayonnaise on sandwiches. Or use only a small amount of mayonnaise. Request lettuce, onion and tomato for sandwich toppings.    Instead of regular salad dressing, try salsa, lemon juice, light salad dressing or vinegar and oil.    Ask for lower-fat versions of food items, even if they are not listed on the  menu. For example, request skim milk instead of 2% milk.    Ask to have vegetables steamed, with no added sauce or butter.    Skip the appetizers. Most are high in fat   and sodium.    Order a vegetable pizza. Ask for red sauce and less cheese. Avoid stuffed-crust pizza.    For dessert, try fresh fruit, sorbet, frozen yogurt or kenia food cake without frosting.      Websites  Academy of Nutrition and Dietetics.   Go to www.eatright.org.  American Heart Association. Getting Healthy at www.heart.org/HEARTORG/GettingHealthy/GettingHealthy_[facility]_001078_SubHomePage.jsp  For informational purposes only. Not to replace the advice of your health care provider.   Copyright   2005 [employer]. All rights reserved. [employer] 367150 - REV 10/12.             Follow-ups after your visit        Additional Services     Follow-Up with Cardiologist                 Future tests that were ordered for you today     Open Future Orders        Priority Expected Expires Ordered    Follow-Up with Cardiologist Routine 8/7/2018 5/9/2019 5/9/2018    Lipid Profile Routine 6/16/2018 5/9/2019 5/9/2018            Who to contact     If you have questions or need follow up information about today's clinic visit or your schedule please contact [facility] directly at [phone].  Normal or non-critical lab and imaging results will be communicated to you by [place] [employer], letter or phone within 4 business days after the clinic has received the results. If you do not hear from us within 7 days, please contact the clinic through [place] [employer] or phone. If you have a critical or abnormal lab result, we will notify you by phone as soon as possible.  Submit refill requests through [employer] or call your pharmacy and they will forward the refill request to us. Please allow 3 business days for your refill to be completed.          Additional Information About Your Visit        [facility] Information     [employer] lets you send  "messages to your doctor, view your test results, renew your prescriptions, schedule appointments and more. To sign up, go to [email or web address] . Click on \"Log in\" on the left side of the screen, which will take you to the Welcome page. Then click on \"Sign up Now\" on the right side of the page.     You will be asked to enter the access code listed below, as well as some personal information. Please follow the directions to create your username and password.     Your access code is: [email or web address]  Expires: 2018  1:08 PM     Your access code will  in 90 days. If you need help or a new code, please call your [place] clinic or [phone].        Care EveryWhere ID     This is your Care EveryWhere ID. This could be used by other organizations to access your [place] medical records  [id number]        Your Vitals Were     Pulse Height BMI (Body Mass Index)             75 1.702 m (5' 7\") 27.1 kg/m2          Blood Pressure from Last 3 Encounters:   18 110/74   18 106/75   18 121/90    Weight from Last 3 Encounters:   18 78.5 kg (173 lb)   18 80.9 kg (178 lb 6.4 oz)   18 79.5 kg (175 lb 3.2 oz)              We Performed the Following     Follow-Up with Cardiac Advanced Practice Provider        Primary Care Provider Office Phone # Fax #    [facility] [phone] [address] [place] [address]        Equal Access to Services     ANGELICA HAYNES : Hadii stephanie Stephenson, waaxda luqadaha, qaybta kaalmamarie gayle, consuelo stover. So [facility] [phone].    ATENCIÓN: Si habla español, tiene a garland disposición servicios gratuitos de asistencia lingüística. Llame al [phone].    We comply with applicable federal civil rights laws and [place] laws. We do not discriminate on the basis of race, color, national origin, age, disability, sex, sexual orientation, or gender identity.            Thank you!  "    Thank you for choosing [facility]  for your care. Our goal is always to provide you with excellent care. Hearing back from our patients is one way we can continue to improve our services. Please take a few minutes to complete the written survey that you may receive in the mail after your visit with us. Thank you!             Your Updated Medication List - Protect others around you: Learn how to safely use, store and throw away your medicines at www.disposemymeds.org.          This list is accurate as of 5/9/18 10:35 AM.  Always use your most recent med list.                   Brand Name Dispense Instructions for use Diagnosis    aspirin 81 MG EC tablet     30 tablet    Take 1 tablet (81 mg) by mouth daily    ST elevation myocardial infarction (STEMI), unspecified artery (H)       atorvastatin 80 MG tablet    LIPITOR    90 tablet    Take 1 tablet (80 mg) by mouth daily    Hyperlipidemia LDL goal <70       lisinopril 2.5 MG tablet    PRINIVIL/Zestril    90 tablet    Take 1 tablet (2.5 mg) by mouth daily    ST elevation myocardial infarction (STEMI), unspecified artery (H)       metoprolol succinate 25 MG 24 hr tablet    TOPROL-XL    90 tablet    Take 1 tablet (25 mg) by mouth daily    ST elevation myocardial infarction (STEMI), unspecified artery (H)       nitroGLYcerin 0.4 MG sublingual tablet    NITROSTAT    25 tablet    For chest pain place 1 tablet under the tongue every 5 minutes for 3 doses. If symptoms persist 5 minutes after 1st dose call 911.    ST elevation myocardial infarction (STEMI), unspecified artery (H), Hyperlipidemia LDL goal <70       ticagrelor 90 MG tablet    BRILINTA    60 tablet    Take 1 tablet (90 mg) by mouth 2 times daily    ST elevation myocardial infarction (STEMI), unspecified artery (H)

## 2018-05-09 NOTE — LETTER
"5/9/2018    [facility]  [address]  [place] [address]    RE: Alonso Kan       Dear Colleague,    I had the pleasure of seeing Alonso Kan in the [facility].    Cardiology Clinic Progress Note  Alonso Kan MRN# [id number]   YOB: 1985 Age: 32 year old     Reason For Visit:  Hospital discharge s/p STEMI   Primary Cardiologist:   Dr. Swain          History of Presenting Illness:    Alonso Kan is a pleasant 32 year old patient who was admitted on 4/24/2018 with shortness of breath, nausea and vomiting. EKG in the ER confirmed inferior STEMI resulting in emergent angiogram and stenting of the RCA. There was noted minimal disease (20%) in the LAD and PDA as well as mild no flow limiting disease in the circumflex. Follow up echo confirmed left ventricle is normal in size. The visual ejection fraction is estimated at 50-55%.There is moderate to severe inferior and inferolateral wall hypokinesis. No significant valvular heart disease.Prior to this admission, he had no past medical history with the excepiton of family history of early coronary artery disease.  He comes to the office today for his first outpatient follow up.     He is feeling well on a cardiac standpoint, denies chest pain, shortness of breath, PND, orthopnea, presyncope, syncope, edema, heart racing, or palpitations. His only complaint is mild soreness to left radial access site. He does mention a single episode of \"chest discomfort\" while walking and carrying a 3 month old child. The pain lasted for ~ an hour and then resolved, he did not use any PRN Nitro. He reports no further reoccurrence.  He does admit to having some anxiety.     Blood pressure is well controlled at 110/74, HR 75, managed well on metoprolol and lisinopril.   He plans to begin cardiac rehab in the next week. Unfortunately he had to cancel his initial appointment due to a work commitment.     He follows a " "heart healthy diet and makes all of his meals. He does not consume any meat and is very strict on grains and carbohydrates.   Compliant with all medications.                    Assessment and Plan:      (I21.3) ST elevation myocardial infarction (STEMI), unspecified artery (H)  (primary encounter diagnosis)  Comment: Stable, no symptoms of ischemia. LV function low normal 50-55%  Plan: Brilinta (min 1 year), aspirin (lifelong), BB, ACE, and statin.   Encourage cardiac rehab, continue with routine exercise regimen.      (E78.5) Hyperlipidemia with target LDL less than 70  Comment: LDL not at goal (162), started on high dose statin.  Plan: Due for follow up lipids in 4 weeks.   Continue on Lipitor.   Heart healthy diet.   Printed diet information given for reference.              Thank you for allowing me to participate in this delightful patient's care. He is scheduled to follow up with Dr. Swain to establish care.      FARHANA Escobar, CNP          Review of Systems:   Review of Systems:  Skin:  Negative     Eyes:  Negative    ENT:  Negative    Respiratory:  Positive for dyspnea on exertion  Cardiovascular:  Negative    Gastroenterology: Negative    Genitourinary:  Negative    Musculoskeletal:  Negative    Neurologic:  Negative    Psychiatric:  Negative    Heme/Lymph/Imm:  Negative    Endocrine:  Negative                Physical Exam:     Vitals: /74  Pulse 75  Ht 1.702 m (5' 7\")  Wt 78.5 kg (173 lb)  BMI 27.1 kg/m2  Constitutional:  cooperative, alert and oriented, well developed, well nourished, in no acute distress        Skin:  warm and dry to the touch, no apparent skin lesions or masses noted   Healing left radial scab, no hematoma or s/s of infection.     Head:  normocephalic, no masses or lesions        Eyes:  pupils equal and round, conjunctivae and lids unremarkable, sclera white, no xanthalasma, EOMS intact, no nystagmus        ENT:  no pallor or cyanosis, dentition good        Neck:  " carotid pulses are full and equal bilaterally, JVP normal, no carotid bruit        Chest:  normal breath sounds, clear to auscultation, normal A-P diameter, normal symmetry, normal respiratory excursion, no use of accessory muscles        Cardiac: regular rhythm, normal S1/S2, no S3 or S4, apical impulse not displaced, no murmurs, gallops or rubs     no presence of murmur            Abdomen:  BS normoactive;non-tender        Vascular:         2+           2+ 2+                Extremities and Back:  no deformities, clubbing, cyanosis, erythema observed;no edema        Neurological:  no gross motor deficits                 Medications:     Current Outpatient Prescriptions   Medication Sig Dispense Refill     aspirin EC 81 MG EC tablet Take 1 tablet (81 mg) by mouth daily 30 tablet 3     atorvastatin (LIPITOR) 80 MG tablet Take 1 tablet (80 mg) by mouth daily 90 tablet 3     lisinopril (PRINIVIL/ZESTRIL) 2.5 MG tablet Take 1 tablet (2.5 mg) by mouth daily 90 tablet 3     metoprolol succinate (TOPROL-XL) 25 MG 24 hr tablet Take 1 tablet (25 mg) by mouth daily 90 tablet 3     nitroGLYcerin (NITROSTAT) 0.4 MG sublingual tablet For chest pain place 1 tablet under the tongue every 5 minutes for 3 doses. If symptoms persist 5 minutes after 1st dose call 911. 25 tablet 0     ticagrelor (BRILINTA) 90 MG tablet Take 1 tablet (90 mg) by mouth 2 times daily 60 tablet 11           Family History   Problem Relation Age of Onset     Coronary Artery Disease Mother      Coronary Artery Disease Father        Social History     Social History     Marital status: Single     Spouse name: N/A     Number of children: N/A     Years of education: N/A     Occupational History     Not on file.     Social History Main Topics     Smoking status: Never Smoker     Smokeless tobacco: Never Used     Alcohol use No     Drug use: No     Sexual activity: No     Other Topics Concern     Not on file     Social History Narrative            Past Medical  History:     Past Medical History:   Diagnosis Date     Coronary artery disease     STEMI 04/2018     Hyperlipidemia      STEMI (ST elevation myocardial infarction) (H)               Past Surgical History:   No past surgical history on file.           Allergies:   Review of patient's allergies indicates no known allergies.       Data:   All laboratory data reviewed:    LAST CHOLESTEROL:  Lab Results   Component Value Date    CHOL 240 04/24/2018     Lab Results   Component Value Date    HDL 35 04/24/2018     Lab Results   Component Value Date     04/24/2018     Lab Results   Component Value Date    TRIG 213 04/24/2018     No results found for: CHOLHDLRATIO    LAST BMP:  Lab Results   Component Value Date     05/09/2018      Lab Results   Component Value Date    POTASSIUM 4.0 05/09/2018     Lab Results   Component Value Date    CHLORIDE 102 05/09/2018     Lab Results   Component Value Date    JANNIE 9.9 05/09/2018     Lab Results   Component Value Date    CO2 25 05/09/2018     Lab Results   Component Value Date    BUN 6 05/09/2018     Lab Results   Component Value Date    CR 0.90 05/09/2018     Lab Results   Component Value Date     05/09/2018       LAST CBC:  Lab Results   Component Value Date    WBC 11.9 04/26/2018    WBC 11.9 04/26/2018     Lab Results   Component Value Date    RBC 4.44 04/26/2018     Lab Results   Component Value Date    HGB 14.2 04/26/2018     Lab Results   Component Value Date    HCT 39.8 04/26/2018     Lab Results   Component Value Date    MCV 90 04/26/2018     Lab Results   Component Value Date    MCH 32.0 04/26/2018     Lab Results   Component Value Date    MCHC 35.7 04/26/2018     Lab Results   Component Value Date    RDW 12.2 04/26/2018     Lab Results   Component Value Date     04/26/2018         FARHANA Escobar, CNP    Thank you for allowing me to participate in the care of your patient.      Sincerely,     FARHANA Escobar CNP     [facility]  [facility]    cc:   Mariposa Kelley, APRN CNP  [address] [place] [address]

## 2018-05-09 NOTE — PATIENT INSTRUCTIONS
Thanks for coming into [facility] today.  Reviewed angiogram results  Labs normal today.  Continue with current medical therapy  Follow up in 4 weeks with cholesterol labs  Encourage cardiac rehab.  Heart healthy diet and exercise.   Follow up in 3 months to establish care with Dr. Swain           Please call my nurse at [phone] with any questions or concerns.    Scheduling phone number: [phone]  Reminder: Please bring in all current medications, over the counter supplements and vitamin bottles to your next appointment.        Tips for Heart-Healthy Cooking and Eating  Ten tips for cooking  1 Try low-fat cooking methods:    Bake    Broil    Schwana    Microwave    Roast    Steam    Poach    Stir-huggins  2 Always use non-stick pans or silicone baking sheets. If you must oil a pan, heat the pan first. This way you will need less oil to cover the pan. Use a small amount of oil or liquid, such as:    Low-sodium broth    Olive oil    Canola oil    Non-stick cooking spray  3 Choose fish and lean cuts of meat, like:     Tenderloin    Top round    Short loin    Sirloin tip    Extra lean ground beef or turkey breast    Skinless poultry (chicken and turkey)  Trim fat from meat and poultry before preparing.   4 After meat is cooked, pour off the drippings and put them in the refrigerator. Once they have chilled, remove the hardened fat from the top. Use the leftover drippings for a homemade broth, gravy or marinade.  5 After making soup or stew, put it in the refrigerator. Once it has chilled, remove the hardened fat before reheating.  6 Baste meats with a fat-free liquid, such as:    Lemon juice    Low-sodium tomato juice    Low-sodium broth    Vinegar    Wine (avoid cooking wine, which can be high in sodium)  7 Try adding herbs and spices to flavor foods rather than butter, margarine or salt.   8 Avoid salty seasonings like bouillon cubes, chili sauce, seasoned salts, lemon pepper and soy  sauce.  9 Choose fresh, frozen or canned vegetables (with no added salt).    When making packaged foods, use less fat than the directions call for. Some packages give lower-fat cooking options. Remember, most packaged foods are high in sodium.  Dining Out with Your Heart in Mind    Choose smaller portions.    Share an entree. Or eat half of your meal and take the rest home. Ask for a take-home container when the meal is served, then box half of your food before you start eating.    Eat fish, skinless poultry (chicken, turkey)   or other lean cuts of meat (sirloin, tenderloin   or filet).    Trim fat off meat and remove skin from poultry.    Ask for sauce, dressing, cream cheese, butter, margarine and peanut butter on the side.   Dip your food into these so you eat only   small amounts.    Choose grilled, steamed, broiled, poached or baked items. Avoid fried foods.    Limit foods that are buttered, creamed, sauteed, fried, crispy, escalloped or served in gravy.    Read the menu carefully. When in doubt, ask your  how the food is prepared.    Ask to replace french fries and potato chips with fruits or vegetables.    Use mustard instead of mayonnaise on sandwiches. Or use only a small amount of mayonnaise. Request lettuce, onion and tomato for sandwich toppings.    Instead of regular salad dressing, try salsa, lemon juice, light salad dressing or vinegar and oil.    Ask for lower-fat versions of food items, even if they are not listed on the menu. For example, request skim milk instead of 2% milk.    Ask to have vegetables steamed, with no added sauce or butter.    Skip the appetizers. Most are high in fat   and sodium.    Order a vegetable pizza. Ask for red sauce and less cheese. Avoid stuffed-crust pizza.    For dessert, try fresh fruit, sorbet, frozen yogurt or kenia food cake without frosting.      Websites  Academy of Nutrition and Dietetics.   Go to www.eatright.org.  American Heart Association. Getting  Healthy at www.heart.org/HEARTORG/GettingHealthy/GettingHealthy_[facility]_001078_SubHomePage.jsp  For informational purposes only. Not to replace the advice of your health care provider.   Copyright   2005 [employer]. All rights reserved. My[employer] 474516 - REV 10/12.

## 2018-06-11 ENCOUNTER — TELEPHONE (OUTPATIENT)
Dept: PHARMACY | Facility: CLINIC | Age: 33
End: 2018-06-11

## 2018-06-11 NOTE — TELEPHONE ENCOUNTER
Pt is doing well, no questions about his medications at this time. He does not feel he is experiencing side effects from his new medications.

## 2018-07-31 DIAGNOSIS — E78.5 HYPERLIPIDEMIA WITH TARGET LDL LESS THAN 70: ICD-10-CM

## 2018-07-31 LAB
CHOLEST SERPL-MCNC: 125 MG/DL
HDLC SERPL-MCNC: 37 MG/DL
LDLC SERPL CALC-MCNC: 74 MG/DL
NONHDLC SERPL-MCNC: 88 MG/DL
TRIGL SERPL-MCNC: 69 MG/DL

## 2018-07-31 PROCEDURE — 80061 LIPID PANEL: CPT | Performed by: INTERNAL MEDICINE

## 2018-07-31 PROCEDURE — 36415 COLL VENOUS BLD VENIPUNCTURE: CPT | Performed by: INTERNAL MEDICINE

## 2018-09-19 PROBLEM — Z82.49 FAMILY HISTORY OF ISCHEMIC HEART DISEASE: Status: RESOLVED | Noted: 2018-05-03 | Resolved: 2018-09-19

## 2018-09-20 ENCOUNTER — OFFICE VISIT (OUTPATIENT)
Dept: CARDIOLOGY | Facility: CLINIC | Age: 33
End: 2018-09-20
Payer: COMMERCIAL

## 2018-09-20 VITALS
SYSTOLIC BLOOD PRESSURE: 120 MMHG | HEART RATE: 80 BPM | HEIGHT: 67 IN | WEIGHT: 169 LBS | DIASTOLIC BLOOD PRESSURE: 84 MMHG | BODY MASS INDEX: 26.53 KG/M2

## 2018-09-20 DIAGNOSIS — I25.119 CORONARY ARTERY DISEASE INVOLVING NATIVE CORONARY ARTERY OF NATIVE HEART WITH ANGINA PECTORIS (H): Primary | ICD-10-CM

## 2018-09-20 DIAGNOSIS — E78.5 HYPERLIPIDEMIA, UNSPECIFIED HYPERLIPIDEMIA TYPE: ICD-10-CM

## 2018-09-20 PROCEDURE — 99214 OFFICE O/P EST MOD 30 MIN: CPT | Performed by: NURSE PRACTITIONER

## 2018-09-20 NOTE — LETTER
9/20/2018    Jayne Shah MD  [address]  [facility] [address]    RE: Alonso Kan       Dear Colleague,    I had the pleasure of seeing Alonso Kan in the [facility].    Cardiology Clinic Progress Note  Alonso Kan MRN# [id number]   YOB: 1985 Age: 33 year old     Reason For Visit:  Left arm weakness   Primary Cardiologist:   Dr. Swain          History of Presenting Illness:    Alonso Kan is a pleasant 33 year old patient who comes to the office today for evaluation of left arm weakness.    He carries a  history significant for inferior STEMI (4/24/18) s/p 2.5 x 38 mm Synergy AHSVIN to the proximal RCA, minimal residual disease in the LAD and PDA (20%) as well as no flow-limiting disease in circumflex.  Echocardiogram showed a ejection fraction estimated at 50-55%. He carries a family history of early coronary disease.      Today, he offers no cardiac complaint, denies chest pain, shortness of breath, PND, orthopnea, presyncope, syncope, edema, heart racing, or palpitations. He states he has a very stressful job and over the past 3 months he recalls 3 episodes of mid sternal chest discomfort exacerbated by anger and stress at work. He denies using any sublingual nitroglycerin as the pain resolved after the incident was resolved. Yesterday, he noticed left arm weakness without pain, numbness or tingling. He reports no pain today.     He is very active working out on his own. He opted out of cardiac rehab.   Blood pressure today is well controlled at 120/84, 10 lb weight loss over the past 3 months with BMI 26.52.     He is a strict vegan and watches his salt intake.  Last lipid panel showed a total cholesterol of 125, HDL 37, LDL 74, triglycerides 69. He does express some hesitation in continuing on high-dose statin but is willing to continue therapy as he has shown significant improvement.    He remains compliant with all medications.Denies any bleeding  on dual anti-platelet therapy. He does mention an occasion rash that presents on his right upper arm that he continues to monitor.                    Assessment and Plan:     1. Coronary artery disease/ STEMI - s/p 2.5 x 38 mm Synergy ASHVIN/ RCA, mild residual disease to LAD and PDA. Normal LV function. Continue on Brilinta, aspirin, metoprolol, lisinopril, and atorvastatin.  Continue with current exercise program.  He needs to monitor for recurrent chest pressure or arm discomfort and notify office if episodes become frequent or do not resolve with rest. Recommend follow-up with his PCP regarding his anxiety.  2. Hyperlipidemia-  Follow up lipid panel shows a total cholesterol 125, HDL 37, LDL 74, and Triglycerides 69, significantly improved with high dose statin. Encourage heart healthy diet.                 Thank you for allowing me to participate in this delightful patient's care.        FARHANA Escobar, CNP          Review of Systems:   Review of Systems:  Skin:  Negative     Eyes:  Negative    ENT:  Negative    Respiratory:  Positive for dyspnea on exertion  Cardiovascular:    Positive for;palpitations  Gastroenterology: Negative    Genitourinary:  Negative    Musculoskeletal:  Negative    Neurologic:  Negative    Psychiatric:  Negative    Heme/Lymph/Imm:  Negative    Endocrine:  Negative                Physical Exam:     GEN:  In general, this is a well nourished male in no acute distress   HEENT:  Pupils equal, round. Sclerae nonicteric. Clear oropharynx. Mucous membranes moist.  NECK: Supple, no masses appreciated. Trachea midline. No JVD   C/V:  Regular rate and rhythm, no murmur, rub or gallop. .   RESP: Respirations are unlabored. No use of accessory muscles. Clear to auscultation bilaterally without wheezing, rales, or rhonchi.  GI: Abdomen soft, nontender, nondistended. No HSM appreciated.   EXTREM: No LE edema. No cyanosis or clubbing.  NEURO: Alert and oriented, cooperative.  No obvious focal  deficits.   PSYCH: Normal affect.  SKIN: Warm and dry. No rashes or petechiae appreciated.          Past Medical History:     Past Medical History:   Diagnosis Date     Anxiety      Arm weakness      Coronary artery disease     STEMI 04/2018     Coronary artery disease due to lipid rich plaque 5/3/2018     Family history of ischemic heart disease 5/3/2018     Hyperlipidemia      Nausea & vomiting      SOB (shortness of breath)      STEMI involving right coronary artery (H) 4/24/2018    S/p ASHVIN/ Prox RCA, mild disease to LAD/Circ              Past Surgical History:   No past surgical history on file.           Allergies:   Review of patient's allergies indicates no known allergies.       Data:   All laboratory data reviewed:    LAST CHOLESTEROL:  Lab Results   Component Value Date    CHOL 125 07/31/2018     Lab Results   Component Value Date    HDL 37 07/31/2018     Lab Results   Component Value Date    LDL 74 07/31/2018     Lab Results   Component Value Date    TRIG 69 07/31/2018     No results found for: CHOLHDLRATIO    LAST BMP:  Lab Results   Component Value Date     05/09/2018      Lab Results   Component Value Date    POTASSIUM 4.0 05/09/2018     Lab Results   Component Value Date    CHLORIDE 102 05/09/2018     Lab Results   Component Value Date    JANNIE 9.9 05/09/2018     Lab Results   Component Value Date    CO2 25 05/09/2018     Lab Results   Component Value Date    BUN 6 05/09/2018     Lab Results   Component Value Date    CR 0.90 05/09/2018     Lab Results   Component Value Date     05/09/2018       LAST CBC:  Lab Results   Component Value Date    WBC 11.9 04/26/2018    WBC 11.9 04/26/2018     Lab Results   Component Value Date    RBC 4.44 04/26/2018     Lab Results   Component Value Date    HGB 14.2 04/26/2018     Lab Results   Component Value Date    HCT 39.8 04/26/2018     Lab Results   Component Value Date    MCV 90 04/26/2018     Lab Results   Component Value Date    MCH 32.0 04/26/2018      Lab Results   Component Value Date    MCHC 35.7 04/26/2018     Lab Results   Component Value Date    RDW 12.2 04/26/2018     Lab Results   Component Value Date     04/26/2018       Thank you for allowing me to participate in the care of your patient.    Sincerely,     FARHANA Escobar [facility]

## 2018-09-20 NOTE — PROGRESS NOTES
Cardiology Clinic Progress Note  Alonso Kan MRN# [id number]   YOB: 1985 Age: 33 year old     Reason For Visit:  Left arm weakness   Primary Cardiologist:   Dr. Swain          History of Presenting Illness:    Alonso Kan is a pleasant 33 year old patient who comes to the office today for evaluation of left arm weakness.    He carries a  history significant for inferior STEMI (4/24/18) s/p 2.5 x 38 mm Synergy ASHVIN to the proximal RCA, minimal residual disease in the LAD and PDA (20%) as well as no flow-limiting disease in circumflex.  Echocardiogram showed a ejection fraction estimated at 50-55%. He carries a family history of early coronary disease.      Today, he offers no cardiac complaint, denies chest pain, shortness of breath, PND, orthopnea, presyncope, syncope, edema, heart racing, or palpitations. He states he has a very stressful job and over the past 3 months he recalls 3 episodes of mid sternal chest discomfort exacerbated by anger and stress at work. He denies using any sublingual nitroglycerin as the pain resolved after the incident was resolved. Yesterday, he noticed left arm weakness without pain, numbness or tingling. He reports no pain today.     He is very active working out on his own. He opted out of cardiac rehab.   Blood pressure today is well controlled at 120/84, 10 lb weight loss over the past 3 months with BMI 26.52.     He is a strict vegan and watches his salt intake.  Last lipid panel showed a total cholesterol of 125, HDL 37, LDL 74, triglycerides 69. He does express some hesitation in continuing on high-dose statin but is willing to continue therapy as he has shown significant improvement.    He remains compliant with all medications.Denies any bleeding on dual anti-platelet therapy. He does mention an occasion rash that presents on his right upper arm that he continues to monitor.                    Assessment and Plan:     1. Coronary artery disease/ STEMI - s/p  2.5 x 38 mm Synergy ASHVIN/ RCA, mild residual disease to LAD and PDA. Normal LV function. Continue on Brilinta, aspirin, metoprolol, lisinopril, and atorvastatin.  Continue with current exercise program.  He needs to monitor for recurrent chest pressure or arm discomfort and notify office if episodes become frequent or do not resolve with rest. Recommend follow-up with his PCP regarding his anxiety.  2. Hyperlipidemia-  Follow up lipid panel shows a total cholesterol 125, HDL 37, LDL 74, and Triglycerides 69, significantly improved with high dose statin. Encourage heart healthy diet.                 Thank you for allowing me to participate in this delightful patient's care.        Mariposa Kelley, FARHANA, CNP          Review of Systems:   Review of Systems:  Skin:  Negative     Eyes:  Negative    ENT:  Negative    Respiratory:  Positive for dyspnea on exertion  Cardiovascular:    Positive for;palpitations  Gastroenterology: Negative    Genitourinary:  Negative    Musculoskeletal:  Negative    Neurologic:  Negative    Psychiatric:  Negative    Heme/Lymph/Imm:  Negative    Endocrine:  Negative                Physical Exam:     GEN:  In general, this is a well nourished male in no acute distress   HEENT:  Pupils equal, round. Sclerae nonicteric. Clear oropharynx. Mucous membranes moist.  NECK: Supple, no masses appreciated. Trachea midline. No JVD   C/V:  Regular rate and rhythm, no murmur, rub or gallop. .   RESP: Respirations are unlabored. No use of accessory muscles. Clear to auscultation bilaterally without wheezing, rales, or rhonchi.  GI: Abdomen soft, nontender, nondistended. No HSM appreciated.   EXTREM: No LE edema. No cyanosis or clubbing.  NEURO: Alert and oriented, cooperative.  No obvious focal deficits.   PSYCH: Normal affect.  SKIN: Warm and dry. No rashes or petechiae appreciated.          Past Medical History:     Past Medical History:   Diagnosis Date     Anxiety      Arm weakness      Coronary artery  disease     STEMI 04/2018     Coronary artery disease due to lipid rich plaque 5/3/2018     Family history of ischemic heart disease 5/3/2018     Hyperlipidemia      Nausea & vomiting      SOB (shortness of breath)      STEMI involving right coronary artery (H) 4/24/2018    S/p ASHVIN/ Prox RCA, mild disease to LAD/Circ              Past Surgical History:   No past surgical history on file.           Allergies:   Review of patient's allergies indicates no known allergies.       Data:   All laboratory data reviewed:    LAST CHOLESTEROL:  Lab Results   Component Value Date    CHOL 125 07/31/2018     Lab Results   Component Value Date    HDL 37 07/31/2018     Lab Results   Component Value Date    LDL 74 07/31/2018     Lab Results   Component Value Date    TRIG 69 07/31/2018     No results found for: CHOLHDLRATIO    LAST BMP:  Lab Results   Component Value Date     05/09/2018      Lab Results   Component Value Date    POTASSIUM 4.0 05/09/2018     Lab Results   Component Value Date    CHLORIDE 102 05/09/2018     Lab Results   Component Value Date    JANNIE 9.9 05/09/2018     Lab Results   Component Value Date    CO2 25 05/09/2018     Lab Results   Component Value Date    BUN 6 05/09/2018     Lab Results   Component Value Date    CR 0.90 05/09/2018     Lab Results   Component Value Date     05/09/2018       LAST CBC:  Lab Results   Component Value Date    WBC 11.9 04/26/2018    WBC 11.9 04/26/2018     Lab Results   Component Value Date    RBC 4.44 04/26/2018     Lab Results   Component Value Date    HGB 14.2 04/26/2018     Lab Results   Component Value Date    HCT 39.8 04/26/2018     Lab Results   Component Value Date    MCV 90 04/26/2018     Lab Results   Component Value Date    MCH 32.0 04/26/2018     Lab Results   Component Value Date    MCHC 35.7 04/26/2018     Lab Results   Component Value Date    RDW 12.2 04/26/2018     Lab Results   Component Value Date     04/26/2018

## 2018-09-20 NOTE — LETTER
9/20/2018    Jayne Shah MD  [address]  [facility] [address]    RE: Alonso Kan       Dear Colleague,    I had the pleasure of seeing Alonso Kan in the [facility].    Cardiology Clinic Progress Note  Alonso Kan MRN# [id number]   YOB: 1985 Age: 33 year old     Reason For Visit:  Left arm weakness   Primary Cardiologist:   Dr. Swain          History of Presenting Illness:    Alonso Kan is a pleasant 33 year old patient who comes to the office today for evaluation of left arm weakness.    He carries a  history significant for inferior STEMI (4/24/18) s/p 2.5 x 38 mm Synergy ASHVIN to the proximal RCA, minimal residual disease in the LAD and PDA (20%) as well as no flow-limiting disease in circumflex.  Echocardiogram showed a ejection fraction estimated at 50-55%. He carries a family history of early coronary disease.      Today, he offers no cardiac complaint, denies chest pain, shortness of breath, PND, orthopnea, presyncope, syncope, edema, heart racing, or palpitations. He states he has a very stressful job and over the past 3 months he recalls 3 episodes of mid sternal chest discomfort exacerbated by anger and stress at work. He denies using any sublingual nitroglycerin as the pain resolved after the incident was resolved. Yesterday, he noticed left arm weakness without pain, numbness or tingling. He reports no pain today.     He is very active working out on his own. He opted out of cardiac rehab.   Blood pressure today is well controlled at 120/84, 10 lb weight loss over the past 3 months with BMI 26.52.     He is a strict vegan and watches his salt intake.  Last lipid panel showed a total cholesterol of 125, HDL 37, LDL 74, triglycerides 69. He does express some hesitation in continuing on high-dose statin but is willing to continue therapy as he has shown significant improvement.    He remains compliant with all medications.Denies any bleeding  on dual anti-platelet therapy. He does mention an occasion rash that presents on his right upper arm that he continues to monitor.                    Assessment and Plan:     1. Coronary artery disease/ STEMI - s/p 2.5 x 38 mm Synergy ASHVIN/ RCA, mild residual disease to LAD and PDA. Normal LV function. Continue on Brilinta, aspirin, metoprolol, lisinopril, and atorvastatin.  Continue with current exercise program.  He needs to monitor for recurrent chest pressure or arm discomfort and notify office if episodes become frequent or do not resolve with rest. Recommend follow-up with his PCP regarding his anxiety.  2. Hyperlipidemia-  Follow up lipid panel shows a total cholesterol 125, HDL 37, LDL 74, and Triglycerides 69, significantly improved with high dose statin. Encourage heart healthy diet.                 Thank you for allowing me to participate in this delightful patient's care.        FARHANA Escobar, CNP          Review of Systems:   Review of Systems:  Skin:  Negative     Eyes:  Negative    ENT:  Negative    Respiratory:  Positive for dyspnea on exertion  Cardiovascular:    Positive for;palpitations  Gastroenterology: Negative    Genitourinary:  Negative    Musculoskeletal:  Negative    Neurologic:  Negative    Psychiatric:  Negative    Heme/Lymph/Imm:  Negative    Endocrine:  Negative                Physical Exam:     GEN:  In general, this is a well nourished male in no acute distress   HEENT:  Pupils equal, round. Sclerae nonicteric. Clear oropharynx. Mucous membranes moist.  NECK: Supple, no masses appreciated. Trachea midline. No JVD   C/V:  Regular rate and rhythm, no murmur, rub or gallop. .   RESP: Respirations are unlabored. No use of accessory muscles. Clear to auscultation bilaterally without wheezing, rales, or rhonchi.  GI: Abdomen soft, nontender, nondistended. No HSM appreciated.   EXTREM: No LE edema. No cyanosis or clubbing.  NEURO: Alert and oriented, cooperative.  No obvious focal  deficits.   PSYCH: Normal affect.  SKIN: Warm and dry. No rashes or petechiae appreciated.          Past Medical History:     Past Medical History:   Diagnosis Date     Anxiety      Arm weakness      Coronary artery disease     STEMI 04/2018     Coronary artery disease due to lipid rich plaque 5/3/2018     Family history of ischemic heart disease 5/3/2018     Hyperlipidemia      Nausea & vomiting      SOB (shortness of breath)      STEMI involving right coronary artery (H) 4/24/2018    S/p ASHVIN/ Prox RCA, mild disease to LAD/Circ              Past Surgical History:   No past surgical history on file.           Allergies:   Review of patient's allergies indicates no known allergies.       Data:   All laboratory data reviewed:    LAST CHOLESTEROL:  Lab Results   Component Value Date    CHOL 125 07/31/2018     Lab Results   Component Value Date    HDL 37 07/31/2018     Lab Results   Component Value Date    LDL 74 07/31/2018     Lab Results   Component Value Date    TRIG 69 07/31/2018     No results found for: CHOLHDLRATIO    LAST BMP:  Lab Results   Component Value Date     05/09/2018      Lab Results   Component Value Date    POTASSIUM 4.0 05/09/2018     Lab Results   Component Value Date    CHLORIDE 102 05/09/2018     Lab Results   Component Value Date    JANNIE 9.9 05/09/2018     Lab Results   Component Value Date    CO2 25 05/09/2018     Lab Results   Component Value Date    BUN 6 05/09/2018     Lab Results   Component Value Date    CR 0.90 05/09/2018     Lab Results   Component Value Date     05/09/2018       LAST CBC:  Lab Results   Component Value Date    WBC 11.9 04/26/2018    WBC 11.9 04/26/2018     Lab Results   Component Value Date    RBC 4.44 04/26/2018     Lab Results   Component Value Date    HGB 14.2 04/26/2018     Lab Results   Component Value Date    HCT 39.8 04/26/2018     Lab Results   Component Value Date    MCV 90 04/26/2018     Lab Results   Component Value Date    MCH 32.0 04/26/2018      Lab Results   Component Value Date    MCHC 35.7 04/26/2018     Lab Results   Component Value Date    RDW 12.2 04/26/2018     Lab Results   Component Value Date     04/26/2018             Thank you for allowing me to participate in the care of your patient.      Sincerely,     FARHANA Escobar CNP     [facility]    cc:   Jayne Shah MD  [address] [address]

## 2018-09-20 NOTE — PATIENT INSTRUCTIONS
Thanks for coming into [facility] clinic today.    Doing well on a cardiac standpoint  Monitor chest discomfort or any reoccurrence of arm weakness.   Continue on current medical therapy.   Recommend follow up with PCP to address anxiety.         Please call my nurse at [phone] with any questions or concerns.    Scheduling phone number: [phone]  Reminder: Please bring in all current medications, over the counter supplements and vitamin bottles to your next appointment.

## 2018-09-20 NOTE — MR AVS SNAPSHOT
After Visit Summary   9/20/2018    Alonso Kan    MRN: [id number]           Patient Information     Date Of Birth          1985        Visit Information        Provider Department      9/20/2018 9:30 AM Mariposa Kelley APRN CNP [facility]        Care Instructions    Thanks for coming into [facility] clinic today.    Doing well on a cardiac standpoint  Monitor chest discomfort or any reoccurrence of arm weakness.   Continue on current medical therapy.   Recommend follow up with PCP to address anxiety.         Please call my nurse at [phone] with any questions or concerns.    Scheduling phone number: [phone]  Reminder: Please bring in all current medications, over the counter supplements and vitamin bottles to your next appointment.                Follow-ups after your visit        Your next 10 appointments already scheduled     Nov 14, 2018 10:15 AM CST   Return Visit with Obed Swain MD   [facility] ([facility])    [address] [place] [id number] [phone] OPT 2              Who to contact     If you have questions or need follow up information about today's clinic visit or your schedule please contact [facility] directly at [phone].  Normal or non-critical lab and imaging results will be communicated to you by MyChart, letter or phone within 4 business days after the clinic has received the results. If you do not hear from us within 7 days, please contact the clinic through MyChart or phone. If you have a critical or abnormal lab result, we will notify you by phone as soon as possible.  Submit refill requests through [employer] or call your pharmacy and they will forward the refill request to us. Please allow 3 business days for your refill to be completed.          Additional Information About Your  "Visit        Care EveryWhere ID     This is your Care EveryWhere ID. This could be used by other organizations to access your [facility] medical records  [id number]        Your Vitals Were     Pulse Height BMI (Body Mass Index)             80 1.702 m (5' 7\") 26.47 kg/m2          Blood Pressure from Last 3 Encounters:   09/20/18 120/84   05/09/18 110/74   05/03/18 106/75    Weight from Last 3 Encounters:   09/20/18 76.7 kg (169 lb)   05/09/18 78.5 kg (173 lb)   05/03/18 80.9 kg (178 lb 6.4 oz)              Today, you had the following     No orders found for display       Primary Care Provider Office Phone # Fax #    Jayne Antonio Shah [phone] [address] [facility] [address]  [facility] [address]        Equal Access to Services     [facility]: Hadii aad ku hadasho Soingaali, waaxda luqadaha, qaybta kaalmada adeegyada, waxay weiin haytemo white . So [facility] [phone].    ATENCIÓN: Si habla español, tiene a garland disposición servicios gratuitos de asistencia lingüística. Oscar al [phone].    We comply with applicable federal civil rights laws and [place] laws. We do not discriminate on the basis of race, color, national origin, age, disability, sex, sexual orientation, or gender identity.            Thank you!     Thank you for choosing [facility]  for your care. Our goal is always to provide you with excellent care. Hearing back from our patients is one way we can continue to improve our services. Please take a few minutes to complete the written survey that you may receive in the mail after your visit with us. Thank you!             Your Updated Medication List - Protect others around you: Learn how to safely use, store and throw away your medicines at www.disposemymeds.org.          This list is accurate as of 9/20/18  9:59 AM.  Always use your most recent med list.                   Brand Name Dispense Instructions for use Diagnosis    aspirin " 81 MG EC tablet     30 tablet    Take 1 tablet (81 mg) by mouth daily    ST elevation myocardial infarction (STEMI), unspecified artery (H)       atorvastatin 80 MG tablet    LIPITOR    90 tablet    Take 1 tablet (80 mg) by mouth daily    Hyperlipidemia LDL goal <70       lisinopril 2.5 MG tablet    PRINIVIL/Zestril    90 tablet    Take 1 tablet (2.5 mg) by mouth daily    ST elevation myocardial infarction (STEMI), unspecified artery (H)       metoprolol succinate 25 MG 24 hr tablet    TOPROL-XL    90 tablet    Take 1 tablet (25 mg) by mouth daily    ST elevation myocardial infarction (STEMI), unspecified artery (H)       nitroGLYcerin 0.4 MG sublingual tablet    NITROSTAT    25 tablet    For chest pain place 1 tablet under the tongue every 5 minutes for 3 doses. If symptoms persist 5 minutes after 1st dose call 911.    ST elevation myocardial infarction (STEMI), unspecified artery (H), Hyperlipidemia LDL goal <70       ticagrelor 90 MG tablet    BRILINTA    60 tablet    Take 1 tablet (90 mg) by mouth 2 times daily    ST elevation myocardial infarction (STEMI), unspecified artery (H)

## 2018-11-14 ENCOUNTER — OFFICE VISIT (OUTPATIENT)
Dept: CARDIOLOGY | Facility: CLINIC | Age: 33
End: 2018-11-14
Attending: NURSE PRACTITIONER
Payer: COMMERCIAL

## 2018-11-14 VITALS
HEART RATE: 88 BPM | DIASTOLIC BLOOD PRESSURE: 83 MMHG | SYSTOLIC BLOOD PRESSURE: 126 MMHG | BODY MASS INDEX: 27.92 KG/M2 | WEIGHT: 177.9 LBS | HEIGHT: 67 IN

## 2018-11-14 DIAGNOSIS — I21.3 ST ELEVATION MYOCARDIAL INFARCTION (STEMI), UNSPECIFIED ARTERY (H): ICD-10-CM

## 2018-11-14 DIAGNOSIS — E78.5 HYPERLIPIDEMIA WITH TARGET LDL LESS THAN 70: ICD-10-CM

## 2018-11-14 PROCEDURE — 99214 OFFICE O/P EST MOD 30 MIN: CPT | Performed by: INTERNAL MEDICINE

## 2018-11-14 NOTE — LETTER
11/14/2018      Jayne Shah MD  [address]  [facility] [address]      RE: Alonso Kan       Dear Colleague,    I had the pleasure of seeing Alonso Kan in the [facility].    Service Date: 11/14/2018      HISTORY OF PRESENT ILLNESS:  Mr. Kan is a very pleasant 33-year-old gentleman who I met 04/24/2018 when he presented with an inferior STEMI.  He had a proximal occlusion of his RCA and had a 3.5 x 38 Synergy drug-eluting stent postdilated to 4.0.  There it was difficult to engage his left system, recommended in the future should he have an angiogram it be done either through the groin or the right radial artery.  He did well following this, has been remaining active, did not do cardiac rehab as he has been active, continuing to work for [employer].  He does have a family history of early coronary artery disease and did have hyperlipidemia.  His LDL was in the 160s.  He was started on appropriate medications including Lipitor 80, aspirin 81, lisinopril 2.5 and Toprol 25 as well as Brilinta.  He has maintained those medications.  About a week ago he had an episode of chest discomfort.  He ended up taking a nitro after 15 minutes and then had nausea and lightheadedness for the next 3 hours as a result of the nitroglycerin, theoretically.  He has not had any symptoms despite continuing to exercise of chest pain or discomfort, and he does a lot of activity.  This summer he was biking 20 miles and then doing tennis, etc., and felt really good with these.  His blood pressure is under good control.  His cholesterol when last checked in July showed an LDL of 74 and an HDL of 37.  He does not have diabetes.  His echo today following his event showed moderate inferior and inferolateral wall hypokinesis, no significant valve disease.      IMPRESSION, REPORT, PLAN:   1.  Inferior ST elevation MI, status post 3.5 x 38 Synergy drug-eluting stent postdilated to 4.0, with mild residual  disease in his coronary arteries.   2.  Episode of chest discomfort a week ago with no subsequent symptoms.      DISCUSSION:  It was a pleasure to see Mr. Meraz in followup.  Today we discussed his history and symptoms.  It is unclear what his event was from a week ago, but he has remained active since then and has not had any further symptoms.  I discussed doing an exercise stress echo, which we will plan to do and have him follow up with the nurse practitioner.  Otherwise, his cardiac risk factors are well controlled.  He has asked about long-term medication use and some of the issues that have come up with regard to statins.  Discussed that the benefits for him certainly outweigh any risks.  We will plan to have him follow up in April.  At that time we will do an echo and probably take him off the Brilinta.  He understands and is in agreement with the plan as outlined.  All questions were answered.  It was a pleasure to see him.  Please do not hesitate to contact me with any questions or concerns.         KATERINA BERUMEN MD         D: 2018   T: 2018   MT: ISAIAH      Name:     DALTON MERAZ   MRN:      [id number]        Account:      [id number]   :      1985           Service Date: 2018      Document: [id number]      Outpatient Encounter Prescriptions as of 2018   Medication Sig Dispense Refill     aspirin EC 81 MG EC tablet Take 1 tablet (81 mg) by mouth daily 30 tablet 3     atorvastatin (LIPITOR) 80 MG tablet Take 1 tablet (80 mg) by mouth daily 90 tablet 3     lisinopril (PRINIVIL/ZESTRIL) 2.5 MG tablet Take 1 tablet (2.5 mg) by mouth daily 90 tablet 3     metoprolol succinate (TOPROL-XL) 25 MG 24 hr tablet Take 1 tablet (25 mg) by mouth daily 90 tablet 3     nitroGLYcerin (NITROSTAT) 0.4 MG sublingual tablet For chest pain place 1 tablet under the tongue every 5 minutes for 3 doses. If symptoms persist 5 minutes after 1st dose call 911. 25 tablet 0     ticagrelor (BRILINTA)  90 MG tablet Take 1 tablet (90 mg) by mouth 2 times daily 60 tablet 11     No facility-administered encounter medications on file as of 11/14/2018.        Again, thank you for allowing me to participate in the care of your patient.      Sincerely,    Obed Swain MD     [facility]

## 2018-11-14 NOTE — LETTER
11/14/2018    Jayne Shah MD  [address]  [place] [address]    RE: Alonso Kan       Dear Colleague,    I had the pleasure of seeing Alonso Kan in the [facility].    HPI:     Please see dictated note    PAST MEDICAL HISTORY:  Past Medical History:   Diagnosis Date     Anxiety      Arm weakness      Coronary artery disease     STEMI 04/2018     Coronary artery disease due to lipid rich plaque 5/3/2018     Family history of ischemic heart disease 5/3/2018     Hyperlipidemia      Nausea & vomiting      SOB (shortness of breath)      STEMI involving right coronary artery (H) 4/24/2018    S/p ASHVIN/ Prox RCA, mild disease to LAD/Circ       CURRENT MEDICATIONS:  Current Outpatient Prescriptions   Medication Sig Dispense Refill     aspirin EC 81 MG EC tablet Take 1 tablet (81 mg) by mouth daily 30 tablet 3     atorvastatin (LIPITOR) 80 MG tablet Take 1 tablet (80 mg) by mouth daily 90 tablet 3     lisinopril (PRINIVIL/ZESTRIL) 2.5 MG tablet Take 1 tablet (2.5 mg) by mouth daily 90 tablet 3     metoprolol succinate (TOPROL-XL) 25 MG 24 hr tablet Take 1 tablet (25 mg) by mouth daily 90 tablet 3     nitroGLYcerin (NITROSTAT) 0.4 MG sublingual tablet For chest pain place 1 tablet under the tongue every 5 minutes for 3 doses. If symptoms persist 5 minutes after 1st dose call 911. 25 tablet 0     ticagrelor (BRILINTA) 90 MG tablet Take 1 tablet (90 mg) by mouth 2 times daily 60 tablet 11       PAST SURGICAL HISTORY:  Past Surgical History:   Procedure Laterality Date     CARDIAC CATHERIZATION  04/24/2018    2.5 x 38mm Synergy ASHVIN to proximal RCA       ALLERGIES   No Known Allergies    FAMILY HISTORY:  Family History   Problem Relation Age of Onset     Coronary Artery Disease Mother      Coronary Artery Disease Father        SOCIAL HISTORY:  Social History     Social History     Marital status: Single     Spouse name: N/A     Number of children: N/A     Years of education: N/A  "    Social History Main Topics     Smoking status: Never Smoker     Smokeless tobacco: Never Used     Alcohol use No     Drug use: No     Sexual activity: No     Other Topics Concern     None     Social History Narrative       ROS:   Constitutional: No fever, chills, or sweats. No weight gain/loss   ENT: No visual disturbance, ear ache, epistaxis, sore throat  Allergies/Immunologic: Negative.   Respiratory: No cough, hemoptysia  Cardiovascular: As per HPI  GI: No nausea, vomiting, hematemesis, melena, or hematochezia  : No urinary frequency, dysuria, or hematuria  Integument: Negative  Psychiatric: Negative  Neuro: Negative  Endocrinology: Negative   Musculoskeletal: Negative    EXAM:  /83  Pulse 88  Ht 1.702 m (5' 7\")  Wt 80.7 kg (177 lb 14.4 oz)  BMI 27.86 kg/m2  In general, the patient is a pleasant male in no apparent distress.    HEENT: NC/AT.  PERRLA.  EOMI.  Sclerae white, not injected.  Nares clear.  Pharynx without erythema or exudate.  Dentition intact.    Neck:  Carotids +4/4 bilaterally without bruits.  No jugular venous distension.   Heart: RRR. Normal S1, S2 splits physiologically. No murmur, rub, click, or gallop.   Lungs: CTA.  No ronchi, wheezes, rales.    Abdomen: Soft, nontender, nondistended.   Extremities: No clubbing, cyanosis, or edema.   Neurologic: Alert and oriented to person/place/time, normal speech, gait and affect  Skin: No petechiae, purpura or rash.    Labs:  LIPID RESULTS:  Lab Results   Component Value Date    CHOL 125 07/31/2018    HDL 37 (L) 07/31/2018    LDL 74 07/31/2018    TRIG 69 07/31/2018    NHDL 88 07/31/2018       LIVER ENZYME RESULTS:  Lab Results   Component Value Date     (H) 04/26/2018    ALT 84 (H) 04/26/2018       CBC RESULTS:  Lab Results   Component Value Date    WBC 11.9 (H) 04/26/2018    WBC 11.9 (H) 04/26/2018    RBC 4.44 04/26/2018    HGB 14.2 04/26/2018    HCT 39.8 (L) 04/26/2018    MCV 90 04/26/2018    MCH 32.0 04/26/2018    MCHC 35.7 " 04/26/2018    RDW 12.2 04/26/2018     04/26/2018       BMP RESULTS:  Lab Results   Component Value Date     05/09/2018    POTASSIUM 4.0 05/09/2018    CHLORIDE 102 05/09/2018    CO2 25 05/09/2018    ANIONGAP 13 05/09/2018     05/09/2018    BUN 6 (L) 05/09/2018    CR 0.90 05/09/2018    GFRESTIMATED >90 05/09/2018    GFRESTBLACK >90 05/09/2018    JANNIE 9.9 05/09/2018        RODY Swain MD       CC  Patient Care Team:  Jayne Shah MD as PCP - General (Internal Medicine)  OPAL NAGY    Service Date: 11/14/2018      HISTORY OF PRESENT ILLNESS:  Mr. Kan is a very pleasant 33-year-old gentleman who I met 04/24/2018 when he presented with an inferior STEMI.  He had a proximal occlusion of his RCA and had a 3.5 x 38 Synergy drug-eluting stent postdilated to 4.0.  There it was difficult to engage his left system, recommended in the future should he have an angiogram it be done either through the groin or the right radial artery.  He did well following this, has been remaining active, did not do cardiac rehab as he has been active, continuing to work for [employer].  He does have a family history of early coronary artery disease and did have hyperlipidemia.  His LDL was in the 160s.  He was started on appropriate medications including Lipitor 80, aspirin 81, lisinopril 2.5 and Toprol 25 as well as Brilinta.  He has maintained those medications.  About a week ago he had an episode of chest discomfort.  He ended up taking a nitro after 15 minutes and then had nausea and lightheadedness for the next 3 hours as a result of the nitroglycerin, theoretically.  He has not had any symptoms despite continuing to exercise of chest pain or discomfort, and he does a lot of activity.  This summer he was biking 20 miles and then doing tennis, etc., and felt really good with these.  His blood pressure is under good control.  His cholesterol when last checked in July showed an LDL of 74 and an HDL of 37.  He  does not have diabetes.  His echo today following his event showed moderate inferior and inferolateral wall hypokinesis, no significant valve disease.      IMPRESSION, REPORT, PLAN:   1.  Inferior ST elevation MI, status post 3.5 x 38 Synergy drug-eluting stent postdilated to 4.0, with mild residual disease in his coronary arteries.   2.  Episode of chest discomfort a week ago with no subsequent symptoms.      DISCUSSION:  It was a pleasure to see Mr. Meraz in followup.  Today we discussed his history and symptoms.  It is unclear what his event was from a week ago, but he has remained active since then and has not had any further symptoms.  I discussed doing an exercise stress echo, which we will plan to do and have him follow up with the nurse practitioner.  Otherwise, his cardiac risk factors are well controlled.  He has asked about long-term medication use and some of the issues that have come up with regard to statins.  Discussed that the benefits for him certainly outweigh any risks.  We will plan to have him follow up in April.  At that time we will do an echo and probably take him off the Brilinta.  He understands and is in agreement with the plan as outlined.  All questions were answered.  It was a pleasure to see him.  Please do not hesitate to contact me with any questions or concerns.         OBED SWAIN MD             D: 2018   T: 2018   MT: ISAIAH      Name:     DALTON MERAZ   MRN:      [id number]        Account:      [id number]   :      1985           Service Date: 2018      Document: [id number]       Thank you for allowing me to participate in the care of your patient.      Sincerely,     Obed Swain MD     [facility]    cc:   Marpiosa Kelley, FARHANA CNP  [address] [place] [address]

## 2018-11-14 NOTE — MR AVS SNAPSHOT
After Visit Summary   11/14/2018    Alonso Kan    MRN: [id number]           Patient Information     Date Of Birth          1985        Visit Information        Provider Department      11/14/2018 10:15 AM March, [facility]bed Topete [facility]        Today's Diagnoses     ST elevation myocardial infarction (STEMI), unspecified artery (H)        Hyperlipidemia with target LDL less than 70           Follow-ups after your visit        Additional Services     Follow-Up with Cardiac Advanced Practice Provider           Follow-Up with Cardiologist                 Future tests that were ordered for you today     Open Future Orders        Priority Expected Expires Ordered    Follow-Up with Cardiologist Routine 5/13/2019 11/14/2019 11/14/2018    Exercise Stress Echocardiogram Routine 11/21/2018 11/14/2019 11/14/2018    Follow-Up with Cardiac Advanced Practice Provider Routine 11/21/2018 11/14/2019 11/14/2018            Who to contact     If you have questions or need follow up information about today's clinic visit or your schedule please contact [facility] directly at [phone].  Normal or non-critical lab and imaging results will be communicated to you by MyChart, letter or phone within 4 business days after the clinic has received the results. If you do not hear from us within 7 days, please contact the clinic through MyChart or phone. If you have a critical or abnormal lab result, we will notify you by phone as soon as possible.  Submit refill requests through [employer] or call your pharmacy and they will forward the refill request to us. Please allow 3 business days for your refill to be completed.          Additional Information About Your Visit        Care EveryWhere ID     This is your Care EveryWhere ID. This could be used by other organizations to access your [facility] medical records  [id number]        Your Vitals  "Were     Pulse Height BMI (Body Mass Index)             88 1.702 m (5' 7\") 27.86 kg/m2          Blood Pressure from Last 3 Encounters:   11/14/18 126/83   09/20/18 120/84   05/09/18 110/74    Weight from Last 3 Encounters:   11/14/18 80.7 kg (177 lb 14.4 oz)   09/20/18 76.7 kg (169 lb)   05/09/18 78.5 kg (173 lb)              We Performed the Following     Follow-Up with Cardiologist        Primary Care Provider Office Phone # Fax #    Jayne Antonio Shah [phone] [address] [address]        Equal Access to Services     SHARMIN HAYNES : Hadii stephanie pattersono Seema, waaxda luqadaha, qaybta kaalmada adeegyada, consuelo white . [facility] [phone].    ATENCIÓN: Si habla español, tiene a garland disposición servicios gratuitos de asistencia lingüística. [facility] [phone].    We comply with applicable federal civil rights laws and [place] laws. We do not discriminate on the basis of race, color, national origin, age, disability, sex, sexual orientation, or gender identity.            Thank you!     Thank you for choosing [facility]  for your care. Our goal is always to provide you with excellent care. Hearing back from our patients is one way we can continue to improve our services. Please take a few minutes to complete the written survey that you may receive in the mail after your visit with us. Thank you!             Your Updated Medication List - Protect others around you: Learn how to safely use, store and throw away your medicines at www.disposemymeds.org.          This list is accurate as of 11/14/18 10:52 AM.  Always use your most recent med list.                   Brand Name Dispense Instructions for use Diagnosis    aspirin 81 MG EC tablet     30 tablet    Take 1 tablet (81 mg) by mouth daily    ST elevation myocardial infarction (STEMI), unspecified artery (H)       atorvastatin 80 MG tablet    LIPITOR "    90 tablet    Take 1 tablet (80 mg) by mouth daily    Hyperlipidemia LDL goal <70       lisinopril 2.5 MG tablet    PRINIVIL/Zestril    90 tablet    Take 1 tablet (2.5 mg) by mouth daily    ST elevation myocardial infarction (STEMI), unspecified artery (H)       metoprolol succinate 25 MG 24 hr tablet    TOPROL-XL    90 tablet    Take 1 tablet (25 mg) by mouth daily    ST elevation myocardial infarction (STEMI), unspecified artery (H)       nitroGLYcerin 0.4 MG sublingual tablet    NITROSTAT    25 tablet    For chest pain place 1 tablet under the tongue every 5 minutes for 3 doses. If symptoms persist 5 minutes after 1st dose call 911.    ST elevation myocardial infarction (STEMI), unspecified artery (H), Hyperlipidemia LDL goal <70       ticagrelor 90 MG tablet    BRILINTA    60 tablet    Take 1 tablet (90 mg) by mouth 2 times daily    ST elevation myocardial infarction (STEMI), unspecified artery (H)

## 2018-11-14 NOTE — PROGRESS NOTES
Service Date: 11/14/2018      HISTORY OF PRESENT ILLNESS:  Mr. Kan is a very pleasant 33-year-old gentleman who I met 04/24/2018 when he presented with an inferior STEMI.  He had a proximal occlusion of his RCA and had a 3.5 x 38 Synergy drug-eluting stent postdilated to 4.0.  There it was difficult to engage his left system, recommended in the future should he have an angiogram it be done either through the groin or the right radial artery.  He did well following this, has been remaining active, did not do cardiac rehab as he has been active, continuing to work for [employer].  He does have a family history of early coronary artery disease and did have hyperlipidemia.  His LDL was in the 160s.  He was started on appropriate medications including Lipitor 80, aspirin 81, lisinopril 2.5 and Toprol 25 as well as Brilinta.  He has maintained those medications.  About a week ago he had an episode of chest discomfort.  He ended up taking a nitro after 15 minutes and then had nausea and lightheadedness for the next 3 hours as a result of the nitroglycerin, theoretically.  He has not had any symptoms despite continuing to exercise of chest pain or discomfort, and he does a lot of activity.  This summer he was biking 20 miles and then doing tennis, etc., and felt really good with these.  His blood pressure is under good control.  His cholesterol when last checked in July showed an LDL of 74 and an HDL of 37.  He does not have diabetes.  His echo today following his event showed moderate inferior and inferolateral wall hypokinesis, no significant valve disease.      IMPRESSION, REPORT, PLAN:   1.  Inferior ST elevation MI, status post 3.5 x 38 Synergy drug-eluting stent postdilated to 4.0, with mild residual disease in his coronary arteries.   2.  Episode of chest discomfort a week ago with no subsequent symptoms.      DISCUSSION:  It was a pleasure to see Mr. Kan in followup.  Today we discussed his history and symptoms.   It is unclear what his event was from a week ago, but he has remained active since then and has not had any further symptoms.  I discussed doing an exercise stress echo, which we will plan to do and have him follow up with the nurse practitioner.  Otherwise, his cardiac risk factors are well controlled.  He has asked about long-term medication use and some of the issues that have come up with regard to statins.  Discussed that the benefits for him certainly outweigh any risks.  We will plan to have him follow up in April.  At that time we will do an echo and probably take him off the Brilinta.  He understands and is in agreement with the plan as outlined.  All questions were answered.  It was a pleasure to see him.  Please do not hesitate to contact me with any questions or concerns.         KATERINA BERUMEN MD             D: 2018   T: 2018   MT: ISAIAH      Name:     DALTON MERAZ   MRN:      [id number]        Account:      [id number]   :      1985           Service Date: 2018      Document: [id number]

## 2018-11-14 NOTE — PROGRESS NOTES
HPI:     Please see dictated note    PAST MEDICAL HISTORY:  Past Medical History:   Diagnosis Date     Anxiety      Arm weakness      Coronary artery disease     STEMI 04/2018     Coronary artery disease due to lipid rich plaque 5/3/2018     Family history of ischemic heart disease 5/3/2018     Hyperlipidemia      Nausea & vomiting      SOB (shortness of breath)      STEMI involving right coronary artery (H) 4/24/2018    S/p ASHVIN/ Prox RCA, mild disease to LAD/Circ       CURRENT MEDICATIONS:  Current Outpatient Prescriptions   Medication Sig Dispense Refill     aspirin EC 81 MG EC tablet Take 1 tablet (81 mg) by mouth daily 30 tablet 3     atorvastatin (LIPITOR) 80 MG tablet Take 1 tablet (80 mg) by mouth daily 90 tablet 3     lisinopril (PRINIVIL/ZESTRIL) 2.5 MG tablet Take 1 tablet (2.5 mg) by mouth daily 90 tablet 3     metoprolol succinate (TOPROL-XL) 25 MG 24 hr tablet Take 1 tablet (25 mg) by mouth daily 90 tablet 3     nitroGLYcerin (NITROSTAT) 0.4 MG sublingual tablet For chest pain place 1 tablet under the tongue every 5 minutes for 3 doses. If symptoms persist 5 minutes after 1st dose call 911. 25 tablet 0     ticagrelor (BRILINTA) 90 MG tablet Take 1 tablet (90 mg) by mouth 2 times daily 60 tablet 11       PAST SURGICAL HISTORY:  Past Surgical History:   Procedure Laterality Date     CARDIAC CATHERIZATION  04/24/2018    2.5 x 38mm Synergy ASHVIN to proximal RCA       ALLERGIES   No Known Allergies    FAMILY HISTORY:  Family History   Problem Relation Age of Onset     Coronary Artery Disease Mother      Coronary Artery Disease Father        SOCIAL HISTORY:  Social History     Social History     Marital status: Single     Spouse name: N/A     Number of children: N/A     Years of education: N/A     Social History Main Topics     Smoking status: Never Smoker     Smokeless tobacco: Never Used     Alcohol use No     Drug use: No     Sexual activity: No     Other Topics Concern     None     Social History Narrative  "      ROS:   Constitutional: No fever, chills, or sweats. No weight gain/loss   ENT: No visual disturbance, ear ache, epistaxis, sore throat  Allergies/Immunologic: Negative.   Respiratory: No cough, hemoptysia  Cardiovascular: As per HPI  GI: No nausea, vomiting, hematemesis, melena, or hematochezia  : No urinary frequency, dysuria, or hematuria  Integument: Negative  Psychiatric: Negative  Neuro: Negative  Endocrinology: Negative   Musculoskeletal: Negative    EXAM:  /83  Pulse 88  Ht 1.702 m (5' 7\")  Wt 80.7 kg (177 lb 14.4 oz)  BMI 27.86 kg/m2  In general, the patient is a pleasant male in no apparent distress.    HEENT: NC/AT.  PERRLA.  EOMI.  Sclerae white, not injected.  Nares clear.  Pharynx without erythema or exudate.  Dentition intact.    Neck:  Carotids +4/4 bilaterally without bruits.  No jugular venous distension.   Heart: RRR. Normal S1, S2 splits physiologically. No murmur, rub, click, or gallop.   Lungs: CTA.  No ronchi, wheezes, rales.    Abdomen: Soft, nontender, nondistended.   Extremities: No clubbing, cyanosis, or edema.   Neurologic: Alert and oriented to person/place/time, normal speech, gait and affect  Skin: No petechiae, purpura or rash.    Labs:  LIPID RESULTS:  Lab Results   Component Value Date    CHOL 125 07/31/2018    HDL 37 (L) 07/31/2018    LDL 74 07/31/2018    TRIG 69 07/31/2018    NHDL 88 07/31/2018       LIVER ENZYME RESULTS:  Lab Results   Component Value Date     (H) 04/26/2018    ALT 84 (H) 04/26/2018       CBC RESULTS:  Lab Results   Component Value Date    WBC 11.9 (H) 04/26/2018    WBC 11.9 (H) 04/26/2018    RBC 4.44 04/26/2018    HGB 14.2 04/26/2018    HCT 39.8 (L) 04/26/2018    MCV 90 04/26/2018    MCH 32.0 04/26/2018    MCHC 35.7 04/26/2018    RDW 12.2 04/26/2018     04/26/2018       BMP RESULTS:  Lab Results   Component Value Date     05/09/2018    POTASSIUM 4.0 05/09/2018    CHLORIDE 102 05/09/2018    CO2 25 05/09/2018    ANIONGAP 13 " 05/09/2018     05/09/2018    BUN 6 (L) 05/09/2018    CR 0.90 05/09/2018    GFRESTIMATED >90 05/09/2018    GFRESTBLACK >90 05/09/2018    JANNIE 9.9 05/09/2018        RODY Swain MD         Patient Care Team:  Jayne Shah MD as PCP - General (Internal Medicine)  OPAL NAGY

## 2018-11-19 ENCOUNTER — HOSPITAL ENCOUNTER (OUTPATIENT)
Dept: CARDIOLOGY | Facility: CLINIC | Age: 33
Discharge: HOME | End: 2018-11-19
Attending: INTERNAL MEDICINE | Admitting: INTERNAL MEDICINE
Payer: COMMERCIAL

## 2018-11-19 DIAGNOSIS — I21.3 ST ELEVATION MYOCARDIAL INFARCTION (STEMI), UNSPECIFIED ARTERY (H): ICD-10-CM

## 2018-11-19 PROCEDURE — 93350 STRESS TTE ONLY: CPT | Mod: 26 | Performed by: INTERNAL MEDICINE

## 2018-11-19 PROCEDURE — 93325 DOPPLER ECHO COLOR FLOW MAPG: CPT | Mod: 26 | Performed by: INTERNAL MEDICINE

## 2018-11-19 PROCEDURE — 93325 DOPPLER ECHO COLOR FLOW MAPG: CPT | Mod: TC

## 2018-11-19 PROCEDURE — 93016 CV STRESS TEST SUPVJ ONLY: CPT | Performed by: INTERNAL MEDICINE

## 2018-11-19 PROCEDURE — 93018 CV STRESS TEST I&R ONLY: CPT | Performed by: INTERNAL MEDICINE

## 2018-11-19 PROCEDURE — 93321 DOPPLER ECHO F-UP/LMTD STD: CPT | Mod: 26 | Performed by: INTERNAL MEDICINE

## 2018-12-20 DIAGNOSIS — I21.3 ST ELEVATION MYOCARDIAL INFARCTION (STEMI), UNSPECIFIED ARTERY (H): ICD-10-CM

## 2019-02-18 ENCOUNTER — OFFICE VISIT (OUTPATIENT)
Dept: CARDIOLOGY | Facility: CLINIC | Age: 34
End: 2019-02-18
Attending: INTERNAL MEDICINE
Payer: COMMERCIAL

## 2019-02-18 VITALS
DIASTOLIC BLOOD PRESSURE: 86 MMHG | HEIGHT: 67 IN | WEIGHT: 177.1 LBS | HEART RATE: 73 BPM | BODY MASS INDEX: 27.8 KG/M2 | SYSTOLIC BLOOD PRESSURE: 127 MMHG

## 2019-02-18 DIAGNOSIS — I25.119 CORONARY ARTERY DISEASE INVOLVING NATIVE CORONARY ARTERY OF NATIVE HEART WITH ANGINA PECTORIS (H): Primary | ICD-10-CM

## 2019-02-18 DIAGNOSIS — I21.3 ST ELEVATION MYOCARDIAL INFARCTION (STEMI), UNSPECIFIED ARTERY (H): ICD-10-CM

## 2019-02-18 DIAGNOSIS — E78.5 HYPERLIPIDEMIA WITH TARGET LDL LESS THAN 70: ICD-10-CM

## 2019-02-18 PROCEDURE — 99214 OFFICE O/P EST MOD 30 MIN: CPT | Performed by: NURSE PRACTITIONER

## 2019-02-18 RX ORDER — ATORVASTATIN CALCIUM 80 MG/1
40 TABLET, FILM COATED ORAL DAILY
Qty: 90 TABLET | Refills: 3 | Status: SHIPPED | OUTPATIENT
Start: 2019-02-18 | End: 2019-05-13 | Stop reason: ALTCHOICE

## 2019-02-18 ASSESSMENT — MIFFLIN-ST. JEOR: SCORE: 1701.95

## 2019-02-18 NOTE — LETTER
"2/18/2019    Jayne Shah MD  [address]  [facility] [address]    RE: Alonso Kan       Dear Colleague,    I had the pleasure of seeing Alonso Kan in the [facility].        Cardiology Clinic Progress Note  Alonso Kan MRN# [id number]   YOB: 1985 Age: 33 year old     Reason For Visit:  Follow up   Primary Cardiologist:   Dr. Swain          History of Presenting Illness:    Alonso Kan is a pleasant 33 year old patient who carries a  history significant for inferior STEMI (4/24/18) s/p 2.5 x 38 mm Synergy ASHVIN to the proximal RCA, minimal residual disease in the LAD and PDA (20%) as well as no flow-limiting disease in circumflex.  Echocardiogram showed a ejection fraction estimated at 50-55%. He carries a family history of early coronary disease.     He was last seen on 11/14/18 where he reported a single episode of chest pain requiring sublingual nitroglycerin. A follow up exercise stress echo demonstrated no evidence of stress -induced ischemia. The previous resting inferior and inferolateral region wall motion abnormalities noted on previous echo had resolved.      Today, he offers no cardiac complaint, denies chest pain, shortness of breath, PND, orthopnea, presyncope, syncope, edema, heart racing, or palpitations. He has spent the last 2 months in [place] and admits to a \"few episodes\" of chest pain requiring SL nitroglycerin.     Blood pressure today is well controlled at 127/86, HR 73 managed well on Toprol and lisinopril  BMI unchanged at 27.8    He is a strict vegan.  Last lipid panel showed a total cholesterol of 125, HDL 37, LDL 74, triglycerides 69. He did report a follow up lipid panel done in November for insurance physical  (labs not available) with similar results as July readings.   He is concerned his high dose statin is causing \"memory issues\" as he is becoming more forgetful.    He continues with yoga and meditation but stopped he " routine exercise, primarily due to weather.    He remains compliant with all medications.Denies any bleeding on dual anti-platelet therapy.                    Assessment and Plan:     1. Coronary artery disease/ STEMI - s/p 2.5 x 38 mm Synergy ASHVIN/ RCA, mild residual disease to LAD and PDA. Normal LV function. Recent stress echo negative for stress- induced ischemia. Continue on Brilinta ( until 4/24/19), aspirin, metoprolol, lisinopril, and atorvastatin.  Encourage exercise and weight loss. Continue to monitor recurrent chest pain. If CP becomes frequent or last longer in duration may consider long acting nitrates.     2. Hyperlipidemia-  Last lipid panel shows a total cholesterol 125, HDL 37, LDL 74, and Triglycerides 69, on high dose statin. Due to concern of memory changes on high dose statin will decrease Lipitor to 40mg daily with follow up FLP in 8 weeks.                 Thank you for allowing me to participate in this delightful patient's care. I have asked him to return in 8 weeks for follow up lipid panel and CATHY visit for clinical update on symptoms. He is encourage to notify office with any worsening symptoms.        FARHANA Escobar, CNP          Review of Systems:   Review of Systems:  Skin:  Negative     Eyes:  Negative    ENT:  Negative    Respiratory:  Positive for cough  Cardiovascular:    Positive for;chest pain  Gastroenterology: Negative    Genitourinary:  Negative    Musculoskeletal:  Negative    Neurologic:  Negative    Psychiatric:  Negative    Heme/Lymph/Imm:  Negative    Endocrine:  Negative                Physical Exam:     GEN:  In general, this is a well nourished male in no acute distress   HEENT:  Pupils equal, round. Sclerae nonicteric. Clear oropharynx. Mucous membranes moist.  NECK: Supple, no masses appreciated. Trachea midline. No JVD   C/V:  Regular rate and rhythm, no murmur, rub or gallop. .   RESP: Respirations are unlabored. No use of accessory muscles. Clear to  auscultation bilaterally without wheezing, rales, or rhonchi.  GI: Abdomen soft, nontender, nondistended. No HSM appreciated.   EXTREM: No LE edema. No cyanosis or clubbing.  NEURO: Alert and oriented, cooperative.  No obvious focal deficits.   PSYCH: Normal affect.  SKIN: Warm and dry. No rashes or petechiae appreciated.          Past Medical History:     Past Medical History:   Diagnosis Date     Anxiety      Arm weakness      Coronary artery disease     STEMI 04/2018     Coronary artery disease due to lipid rich plaque 5/3/2018     Family history of ischemic heart disease 5/3/2018     Hyperlipidemia      Nausea & vomiting      SOB (shortness of breath)      STEMI involving right coronary artery (H) 4/24/2018    S/p ASHVIN/ Prox RCA, mild disease to LAD/Circ              Past Surgical History:     Past Surgical History:   Procedure Laterality Date     CARDIAC CATHERIZATION  04/24/2018    2.5 x 38mm Synergy ASHVIN to proximal RCA              Allergies:   Patient has no known allergies.       Data:   All laboratory data reviewed:    LAST CHOLESTEROL:  Lab Results   Component Value Date    CHOL 125 07/31/2018     Lab Results   Component Value Date    HDL 37 07/31/2018     Lab Results   Component Value Date    LDL 74 07/31/2018     Lab Results   Component Value Date    TRIG 69 07/31/2018     No results found for: CHOLHDLRATIO    LAST BMP:  Lab Results   Component Value Date     05/09/2018      Lab Results   Component Value Date    POTASSIUM 4.0 05/09/2018     Lab Results   Component Value Date    CHLORIDE 102 05/09/2018     Lab Results   Component Value Date    JANNIE 9.9 05/09/2018     Lab Results   Component Value Date    CO2 25 05/09/2018     Lab Results   Component Value Date    BUN 6 05/09/2018     Lab Results   Component Value Date    CR 0.90 05/09/2018     Lab Results   Component Value Date     05/09/2018       LAST CBC:  Lab Results   Component Value Date    WBC 11.9 04/26/2018    WBC 11.9 04/26/2018      Lab Results   Component Value Date    RBC 4.44 04/26/2018     Lab Results   Component Value Date    HGB 14.2 04/26/2018     Lab Results   Component Value Date    HCT 39.8 04/26/2018     Lab Results   Component Value Date    MCV 90 04/26/2018     Lab Results   Component Value Date    MCH 32.0 04/26/2018     Lab Results   Component Value Date    MCHC 35.7 04/26/2018     Lab Results   Component Value Date    RDW 12.2 04/26/2018     Lab Results   Component Value Date     04/26/2018         Thank you for allowing me to participate in the care of your patient.      Sincerely,     FARHANA Escobar [facility]

## 2019-02-18 NOTE — PROGRESS NOTES
"Cardiology Clinic Progress Note  Alonso Kan MRN# [id number]   YOB: 1985 Age: 33 year old     Reason For Visit:  Follow up   Primary Cardiologist:   Dr. Swain          History of Presenting Illness:    Alonso Kan is a pleasant 33 year old patient who carries a  history significant for inferior STEMI (4/24/18) s/p 2.5 x 38 mm Synergy ASHVIN to the proximal RCA, minimal residual disease in the LAD and PDA (20%) as well as no flow-limiting disease in circumflex.  Echocardiogram showed a ejection fraction estimated at 50-55%. He carries a family history of early coronary disease.     He was last seen on 11/14/18 where he reported a single episode of chest pain requiring sublingual nitroglycerin. A follow up exercise stress echo demonstrated no evidence of stress -induced ischemia. The previous resting inferior and inferolateral region wall motion abnormalities noted on previous echo had resolved.      Today, he offers no cardiac complaint, denies chest pain, shortness of breath, PND, orthopnea, presyncope, syncope, edema, heart racing, or palpitations. He has spent the last 2 months in [place] and admits to a \"few episodes\" of chest pain requiring SL nitroglycerin.     Blood pressure today is well controlled at 127/86, HR 73 managed well on Toprol and lisinopril  BMI unchanged at 27.8    He is a strict vegan.  Last lipid panel showed a total cholesterol of 125, HDL 37, LDL 74, triglycerides 69. He did report a follow up lipid panel done in November for insurance physical  (labs not available) with similar results as July readings.   He is concerned his high dose statin is causing \"memory issues\" as he is becoming more forgetful.    He continues with yoga and meditation but stopped he routine exercise, primarily due to weather.    He remains compliant with all medications.Denies any bleeding on dual anti-platelet therapy.                    Assessment and Plan:     1. Coronary artery disease/ STEMI - s/p " 2.5 x 38 mm Synergy ASHVIN/ RCA, mild residual disease to LAD and PDA. Normal LV function. Recent stress echo negative for stress- induced ischemia. Continue on Brilinta ( until 4/24/19), aspirin, metoprolol, lisinopril, and atorvastatin.  Encourage exercise and weight loss. Continue to monitor recurrent chest pain. If CP becomes frequent or last longer in duration may consider long acting nitrates.     2. Hyperlipidemia-  Last lipid panel shows a total cholesterol 125, HDL 37, LDL 74, and Triglycerides 69, on high dose statin. Due to concern of memory changes on high dose statin will decrease Lipitor to 40mg daily with follow up FLP in 8 weeks.                 Thank you for allowing me to participate in this delightful patient's care. I have asked him to return in 8 weeks for follow up lipid panel and CATHY visit for clinical update on symptoms. He is encourage to notify office with any worsening symptoms.        FARHANA Escobar, CNP          Review of Systems:   Review of Systems:  Skin:  Negative     Eyes:  Negative    ENT:  Negative    Respiratory:  Positive for cough  Cardiovascular:    Positive for;chest pain  Gastroenterology: Negative    Genitourinary:  Negative    Musculoskeletal:  Negative    Neurologic:  Negative    Psychiatric:  Negative    Heme/Lymph/Imm:  Negative    Endocrine:  Negative                Physical Exam:     GEN:  In general, this is a well nourished male in no acute distress   HEENT:  Pupils equal, round. Sclerae nonicteric. Clear oropharynx. Mucous membranes moist.  NECK: Supple, no masses appreciated. Trachea midline. No JVD   C/V:  Regular rate and rhythm, no murmur, rub or gallop. .   RESP: Respirations are unlabored. No use of accessory muscles. Clear to auscultation bilaterally without wheezing, rales, or rhonchi.  GI: Abdomen soft, nontender, nondistended. No HSM appreciated.   EXTREM: No LE edema. No cyanosis or clubbing.  NEURO: Alert and oriented, cooperative.  No obvious focal  deficits.   PSYCH: Normal affect.  SKIN: Warm and dry. No rashes or petechiae appreciated.          Past Medical History:     Past Medical History:   Diagnosis Date     Anxiety      Arm weakness      Coronary artery disease     STEMI 04/2018     Coronary artery disease due to lipid rich plaque 5/3/2018     Family history of ischemic heart disease 5/3/2018     Hyperlipidemia      Nausea & vomiting      SOB (shortness of breath)      STEMI involving right coronary artery (H) 4/24/2018    S/p ASHVIN/ Prox RCA, mild disease to LAD/Circ              Past Surgical History:     Past Surgical History:   Procedure Laterality Date     CARDIAC CATHERIZATION  04/24/2018    2.5 x 38mm Synergy ASHVIN to proximal RCA              Allergies:   Patient has no known allergies.       Data:   All laboratory data reviewed:    LAST CHOLESTEROL:  Lab Results   Component Value Date    CHOL 125 07/31/2018     Lab Results   Component Value Date    HDL 37 07/31/2018     Lab Results   Component Value Date    LDL 74 07/31/2018     Lab Results   Component Value Date    TRIG 69 07/31/2018     No results found for: CHOLHDLRATIO    LAST BMP:  Lab Results   Component Value Date     05/09/2018      Lab Results   Component Value Date    POTASSIUM 4.0 05/09/2018     Lab Results   Component Value Date    CHLORIDE 102 05/09/2018     Lab Results   Component Value Date    JANNIE 9.9 05/09/2018     Lab Results   Component Value Date    CO2 25 05/09/2018     Lab Results   Component Value Date    BUN 6 05/09/2018     Lab Results   Component Value Date    CR 0.90 05/09/2018     Lab Results   Component Value Date     05/09/2018       LAST CBC:  Lab Results   Component Value Date    WBC 11.9 04/26/2018    WBC 11.9 04/26/2018     Lab Results   Component Value Date    RBC 4.44 04/26/2018     Lab Results   Component Value Date    HGB 14.2 04/26/2018     Lab Results   Component Value Date    HCT 39.8 04/26/2018     Lab Results   Component Value Date    MCV 90  04/26/2018     Lab Results   Component Value Date    MCH 32.0 04/26/2018     Lab Results   Component Value Date    MCHC 35.7 04/26/2018     Lab Results   Component Value Date    RDW 12.2 04/26/2018     Lab Results   Component Value Date     04/26/2018

## 2019-02-18 NOTE — PATIENT INSTRUCTIONS
Thanks for coming into [facility] today.    Doing well on a cardiac standpoint  Will decrease statin to 40mg daily due to reported memory changes.   Follow up lipid panel in 8 weeks  Follow up CATHY visit in 8 weeks  Call office with any worsening symptoms.     Please call my nurse at [phone] with any questions or concerns.    Scheduling phone number: [phone]  Reminder: Please bring in all current medications, over the counter supplements and vitamin bottles to your next appointment.

## 2019-04-27 DIAGNOSIS — I21.3 ST ELEVATION MYOCARDIAL INFARCTION (STEMI), UNSPECIFIED ARTERY (H): ICD-10-CM

## 2019-04-27 NOTE — TELEPHONE ENCOUNTER
"Last Written Prescription Date:  4/27/18  Last Fill Quantity: 60 tablet,  # refills: 11   Last office visit: 5/3/2018 with prescribing provider:  Alyssa   Future Office Visit:   Next 5 appointments (look out 90 days)    May 13, 2019  2:30 PM CDT  Return Visit with FARHANA Escobar CNP  [facility] [address] [place] [id number] [phone] OPT 2         Requested Prescriptions   Pending Prescriptions Disp Refills     ticagrelor (BRILINTA) 90 MG tablet 60 tablet 11     Sig: Take 1 tablet (90 mg) by mouth 2 times daily       Platelet Inhibitors Failed - 4/27/2019  2:46 PM        Failed - Normal ALT on file in past 12 months     Recent Labs   Lab Test 04/26/18  0522   ALT 84*             Failed - Normal HGB on file in past 12 months     Recent Labs   Lab Test 04/26/18  1445   HGB 14.2               Failed - Normal AST on file in past 12 months     Recent Labs   Lab Test 04/26/18  0522   *             Failed - Normal Platelets on file in past 12 months     Recent Labs   Lab Test 04/26/18  1445                  Passed - Recent (12 mo) or future (30 days) visit within the authorizing provider's specialty     Patient had office visit in the last 12 months or has a visit in the next 30 days with authorizing provider or within the authorizing provider's specialty.  See \"Patient Info\" tab in inbasket, or \"Choose Columns\" in Meds & Orders section of the refill encounter.              Passed - Medication is active on med list        Passed - Patient is age 18 or older        Passed - Normal serum creatinine on file in past 12 months     Recent Labs   Lab Test 05/09/18  0924   CR 0.90               "

## 2019-04-30 NOTE — TELEPHONE ENCOUNTER
Routing refill request to provider for review/approval because:  Labs out of range ALT, AST  Labs not current:  Hgb, PLT, ALT and AST  Last apt was a no show.  Added in pharm comments to schedule a physical and pended 1 month.  Please authorize if appropriate.  Thanks,  Clara Myers RN

## 2019-05-13 ENCOUNTER — OFFICE VISIT (OUTPATIENT)
Dept: CARDIOLOGY | Facility: CLINIC | Age: 34
End: 2019-05-13
Attending: INTERNAL MEDICINE
Payer: COMMERCIAL

## 2019-05-13 ENCOUNTER — HOSPITAL ENCOUNTER (OUTPATIENT)
Dept: CARDIOLOGY | Facility: CLINIC | Age: 34
Discharge: HOME | End: 2019-05-13
Attending: INTERNAL MEDICINE | Admitting: INTERNAL MEDICINE
Payer: COMMERCIAL

## 2019-05-13 VITALS
DIASTOLIC BLOOD PRESSURE: 80 MMHG | HEIGHT: 67 IN | HEART RATE: 78 BPM | SYSTOLIC BLOOD PRESSURE: 112 MMHG | BODY MASS INDEX: 27.81 KG/M2 | WEIGHT: 177.2 LBS

## 2019-05-13 DIAGNOSIS — E78.5 HYPERLIPIDEMIA WITH TARGET LDL LESS THAN 70: ICD-10-CM

## 2019-05-13 DIAGNOSIS — I25.119 CORONARY ARTERY DISEASE INVOLVING NATIVE CORONARY ARTERY OF NATIVE HEART WITH ANGINA PECTORIS (H): ICD-10-CM

## 2019-05-13 DIAGNOSIS — I21.3 ST ELEVATION MYOCARDIAL INFARCTION (STEMI), UNSPECIFIED ARTERY (H): ICD-10-CM

## 2019-05-13 LAB
CHOLEST SERPL-MCNC: 149 MG/DL
HDLC SERPL-MCNC: 39 MG/DL
LDLC SERPL CALC-MCNC: 87 MG/DL
NONHDLC SERPL-MCNC: 110 MG/DL
TRIGL SERPL-MCNC: 115 MG/DL

## 2019-05-13 PROCEDURE — 99214 OFFICE O/P EST MOD 30 MIN: CPT | Mod: 25 | Performed by: NURSE PRACTITIONER

## 2019-05-13 PROCEDURE — 40000264 ECHOCARDIOGRAM COMPLETE

## 2019-05-13 PROCEDURE — 25500064 ZZH RX 255 OP 636: Performed by: INTERNAL MEDICINE

## 2019-05-13 PROCEDURE — 80061 LIPID PANEL: CPT | Performed by: INTERNAL MEDICINE

## 2019-05-13 PROCEDURE — 36415 COLL VENOUS BLD VENIPUNCTURE: CPT | Performed by: INTERNAL MEDICINE

## 2019-05-13 PROCEDURE — 93306 TTE W/DOPPLER COMPLETE: CPT | Mod: 26 | Performed by: INTERNAL MEDICINE

## 2019-05-13 RX ORDER — ROSUVASTATIN CALCIUM 40 MG/1
40 TABLET, COATED ORAL DAILY
Qty: 30 TABLET | Refills: 3 | Status: SHIPPED | OUTPATIENT
Start: 2019-05-13 | End: 2019-09-16

## 2019-05-13 RX ADMIN — HUMAN ALBUMIN MICROSPHERES AND PERFLUTREN 3 ML: 10; .22 INJECTION, SOLUTION INTRAVENOUS at 11:43

## 2019-05-13 ASSESSMENT — MIFFLIN-ST. JEOR: SCORE: 1702.4

## 2019-05-13 NOTE — LETTER
"5/13/2019    Jayne Shah MD  [address]  [facility] [address]    RE: Alonso Kan       Dear Colleague,    I had the pleasure of seeing Alonso Kan in the [facility].    Cardiology Clinic Progress Note  Alonso Kan MRN# [id number]   YOB: 1985 Age: 33 year old     Reason For Visit:  3 month follow up   Primary Cardiologist:   Dr. Swain          History of Presenting Illness:    Alonso Kan is a pleasant 33 year old patient who carries a  history significant for inferior STEMI (4/24/18) s/p 2.5 x 38 mm Synergy ASHVIN to the proximal RCA, minimal residual disease in the LAD and PDA (20%), family history of early coronary disease, and hyperlipidemia.     He was last seen on 2/18/19 after he reported episodes of memory loss/forgetfulness, thought to be related to atorvastatin. He elected to reduce his Lipitor to 40mg daily and returns to the office for a 3 month follow up.     He is feeling well on a cardiac standpoint, denies chest pain, shortness of breath, PND, orthopnea, presyncope, syncope, edema, heart racing, or palpitations. He reports improvement in his \"memory issues\" after Lipitor was reduced. Unfortunately, his follow up lipid panel demonstrated an increase in LDL from 74 to 87, total cholesterol 149, HDL 39, and .     Blood pressure today is well controlled at 112/80, HR 78 managed well on Toprol and lisinopril  BMI stable at 27.75.   He has restarted his exercise routine and walks ~ 10 miles multiple times/week.     Echocardiogram today demonstrated   The left ventricle is normal in size. Mildly decreased left ventricular  systolic function. EF 50-55%. There is mild hypokinesis of the basal and mid  segments of the inferior wall.  The right ventricular systolic function is normal.  Normal left atrial size. Right atrial size is normal.  No hemodynamically significant valvular abnormalities on 2D or color flow  imaging.  Compared to the " study from 4/25/2018, the inferior wall motion abnormality is  less severe on todays echo. LV EF is unchanged.    He follows a strict semi-vegan diet.   He remains compliant with all medications. Denies any bleeding on dual anti-platelet therapy.                    Assessment and Plan:     1. Coronary artery disease/ STEMI - s/p 2.5 x 38 mm Synergy ASHVIN/ RCA, mild residual disease to LAD and PDA (4/2018). Echo today demonstrated low/normal LV function 50-55%, mild hypokiinesis of the basal and mid segments of the inferior wall, less severe than previous study. Completed 1 year of uninterrupted Brilinta, ok to stop. Continue on aspirin, metoprolol, lisinopril, and statin.  Encourage exercise and weight loss.     2.        Hyperlipidemia-   Lipid panel today demonstrated an increase in LDL from 74 to 87, total cholesterol 149, HDL 39, and .  Due to memory changes with high dose atorvastatin and increased LDL on 40mg atorvastatin, will switch to high dose Crestor. Monitor for memory changes or myalgias. Follow up FLP in 8 weeks.                Thank you for allowing me to participate in this delightful patient's care. Follow up in 6 months with Dr. Swain.      Mariposa Kelley, APRN, CNP          Review of Systems:   Review of Systems:  Skin:  Negative     Eyes:  Negative    ENT:  Negative    Respiratory:  Negative    Cardiovascular:    (chest tenderness to touch sometimes)  Gastroenterology: Negative    Genitourinary:  Negative    Musculoskeletal:  Negative    Neurologic:  Negative    Psychiatric:  Negative    Heme/Lymph/Imm:  Negative    Endocrine:  Negative                Physical Exam:     GEN:  In general, this is a well nourished male in no acute distress   HEENT:  Pupils equal, round. Sclerae nonicteric. Clear oropharynx. Mucous membranes moist.  NECK: Supple, no masses appreciated. Trachea midline. No JVD   C/V:  Regular rate and rhythm, no murmur, rub or gallop. .   RESP: Respirations are unlabored. No use  of accessory muscles. Clear to auscultation bilaterally without wheezing, rales, or rhonchi.  GI: Abdomen soft, nontender, nondistended. No HSM appreciated.   EXTREM: No LE edema. No cyanosis or clubbing.  NEURO: Alert and oriented, cooperative.  No obvious focal deficits.   PSYCH: Normal affect.  SKIN: Warm and dry. No rashes or petechiae appreciated.          Past Medical History:     Past Medical History:   Diagnosis Date     Anxiety      Arm weakness      Coronary artery disease     STEMI 04/2018     Coronary artery disease due to lipid rich plaque 5/3/2018     Family history of ischemic heart disease 5/3/2018     Hyperlipidemia      Nausea & vomiting      SOB (shortness of breath)      STEMI involving right coronary artery (H) 4/24/2018    S/p ASHVIN/ Prox RCA, mild disease to LAD/Circ              Past Surgical History:     Past Surgical History:   Procedure Laterality Date     CARDIAC CATHERIZATION  04/24/2018    2.5 x 38mm Synergy ASHVIN to proximal RCA              Allergies:   Patient has no known allergies.       Data:   All laboratory data reviewed:    LAST CHOLESTEROL:  Lab Results   Component Value Date    CHOL 125 07/31/2018     Lab Results   Component Value Date    HDL 37 07/31/2018     Lab Results   Component Value Date    LDL 74 07/31/2018     Lab Results   Component Value Date    TRIG 69 07/31/2018     No results found for: CHOLHDLRATIO    LAST BMP:  Lab Results   Component Value Date     05/09/2018      Lab Results   Component Value Date    POTASSIUM 4.0 05/09/2018     Lab Results   Component Value Date    CHLORIDE 102 05/09/2018     Lab Results   Component Value Date    JANNIE 9.9 05/09/2018     Lab Results   Component Value Date    CO2 25 05/09/2018     Lab Results   Component Value Date    BUN 6 05/09/2018     Lab Results   Component Value Date    CR 0.90 05/09/2018     Lab Results   Component Value Date     05/09/2018       LAST CBC:  Lab Results   Component Value Date    WBC 11.9  04/26/2018    WBC 11.9 04/26/2018     Lab Results   Component Value Date    RBC 4.44 04/26/2018     Lab Results   Component Value Date    HGB 14.2 04/26/2018     Lab Results   Component Value Date    HCT 39.8 04/26/2018     Lab Results   Component Value Date    MCV 90 04/26/2018     Lab Results   Component Value Date    MCH 32.0 04/26/2018     Lab Results   Component Value Date    MCHC 35.7 04/26/2018     Lab Results   Component Value Date    RDW 12.2 04/26/2018     Lab Results   Component Value Date     04/26/2018         Thank you for allowing me to participate in the care of your patient.    Sincerely,     FARHANA Escobar [facility]

## 2019-05-13 NOTE — PATIENT INSTRUCTIONS
Thanks for coming into [facility] today.    Doing well on a cardiac standpoint  Reviewed results of echo  Cholesterol level mildly increased after decrease in Lipitor  Switch lipitor to Crestor 40mg daily  Follow up cholesterol level in 8 weeks.   Completed 1 year of uninterrupted Brilinta, ok to stop.   Continue on all other medications.     Please call my nurse at [phone] with any questions or concerns.    Scheduling phone number: [phone]  Reminder: Please bring in all current medications, over the counter supplements and vitamin bottles to your next appointment.

## 2019-05-13 NOTE — PROGRESS NOTES
"Cardiology Clinic Progress Note  Alonso Kan MRN# [id number]   YOB: 1985 Age: 33 year old     Reason For Visit:  3 month follow up   Primary Cardiologist:   Dr. Swain          History of Presenting Illness:    Alonso Kan is a pleasant 33 year old patient who carries a  history significant for inferior STEMI (4/24/18) s/p 2.5 x 38 mm Synergy ASHVIN to the proximal RCA, minimal residual disease in the LAD and PDA (20%), family history of early coronary disease, and hyperlipidemia.     He was last seen on 2/18/19 after he reported episodes of memory loss/forgetfulness, thought to be related to atorvastatin. He elected to reduce his Lipitor to 40mg daily and returns to the office for a 3 month follow up.     He is feeling well on a cardiac standpoint, denies chest pain, shortness of breath, PND, orthopnea, presyncope, syncope, edema, heart racing, or palpitations. He reports improvement in his \"memory issues\" after Lipitor was reduced. Unfortunately, his follow up lipid panel demonstrated an increase in LDL from 74 to 87, total cholesterol 149, HDL 39, and .     Blood pressure today is well controlled at 112/80, HR 78 managed well on Toprol and lisinopril  BMI stable at 27.75.   He has restarted his exercise routine and walks ~ 10 miles multiple times/week.     Echocardiogram today demonstrated   The left ventricle is normal in size. Mildly decreased left ventricular  systolic function. EF 50-55%. There is mild hypokinesis of the basal and mid  segments of the inferior wall.  The right ventricular systolic function is normal.  Normal left atrial size. Right atrial size is normal.  No hemodynamically significant valvular abnormalities on 2D or color flow  imaging.  Compared to the study from 4/25/2018, the inferior wall motion abnormality is  less severe on todays echo. LV EF is unchanged.    He follows a strict semi-vegan diet.   He remains compliant with all medications. Denies any bleeding on " dual anti-platelet therapy.                    Assessment and Plan:     1. Coronary artery disease/ STEMI - s/p 2.5 x 38 mm Synergy ASHVIN/ RCA, mild residual disease to LAD and PDA (4/2018). Echo today demonstrated low/normal LV function 50-55%, mild hypokiinesis of the basal and mid segments of the inferior wall, less severe than previous study. Completed 1 year of uninterrupted Brilinta, ok to stop. Continue on aspirin, metoprolol, lisinopril, and statin.  Encourage exercise and weight loss.     2.        Hyperlipidemia-   Lipid panel today demonstrated an increase in LDL from 74 to 87, total cholesterol 149, HDL 39, and .  Due to memory changes with high dose atorvastatin and increased LDL on 40mg atorvastatin, will switch to high dose Crestor. Monitor for memory changes or myalgias. Follow up FLP in 8 weeks.                Thank you for allowing me to participate in this delightful patient's care. Follow up in 6 months with Dr. Swain.      FARHANA Escobar, CNP          Review of Systems:   Review of Systems:  Skin:  Negative     Eyes:  Negative    ENT:  Negative    Respiratory:  Negative    Cardiovascular:    (chest tenderness to touch sometimes)  Gastroenterology: Negative    Genitourinary:  Negative    Musculoskeletal:  Negative    Neurologic:  Negative    Psychiatric:  Negative    Heme/Lymph/Imm:  Negative    Endocrine:  Negative                Physical Exam:     GEN:  In general, this is a well nourished male in no acute distress   HEENT:  Pupils equal, round. Sclerae nonicteric. Clear oropharynx. Mucous membranes moist.  NECK: Supple, no masses appreciated. Trachea midline. No JVD   C/V:  Regular rate and rhythm, no murmur, rub or gallop. .   RESP: Respirations are unlabored. No use of accessory muscles. Clear to auscultation bilaterally without wheezing, rales, or rhonchi.  GI: Abdomen soft, nontender, nondistended. No HSM appreciated.   EXTREM: No LE edema. No cyanosis or clubbing.  NEURO: Alert  and oriented, cooperative.  No obvious focal deficits.   PSYCH: Normal affect.  SKIN: Warm and dry. No rashes or petechiae appreciated.          Past Medical History:     Past Medical History:   Diagnosis Date     Anxiety      Arm weakness      Coronary artery disease     STEMI 04/2018     Coronary artery disease due to lipid rich plaque 5/3/2018     Family history of ischemic heart disease 5/3/2018     Hyperlipidemia      Nausea & vomiting      SOB (shortness of breath)      STEMI involving right coronary artery (H) 4/24/2018    S/p ASHVIN/ Prox RCA, mild disease to LAD/Circ              Past Surgical History:     Past Surgical History:   Procedure Laterality Date     CARDIAC CATHERIZATION  04/24/2018    2.5 x 38mm Synergy ASHVIN to proximal RCA              Allergies:   Patient has no known allergies.       Data:   All laboratory data reviewed:    LAST CHOLESTEROL:  Lab Results   Component Value Date    CHOL 125 07/31/2018     Lab Results   Component Value Date    HDL 37 07/31/2018     Lab Results   Component Value Date    LDL 74 07/31/2018     Lab Results   Component Value Date    TRIG 69 07/31/2018     No results found for: CHOLHDLRATIO    LAST BMP:  Lab Results   Component Value Date     05/09/2018      Lab Results   Component Value Date    POTASSIUM 4.0 05/09/2018     Lab Results   Component Value Date    CHLORIDE 102 05/09/2018     Lab Results   Component Value Date    JANNIE 9.9 05/09/2018     Lab Results   Component Value Date    CO2 25 05/09/2018     Lab Results   Component Value Date    BUN 6 05/09/2018     Lab Results   Component Value Date    CR 0.90 05/09/2018     Lab Results   Component Value Date     05/09/2018       LAST CBC:  Lab Results   Component Value Date    WBC 11.9 04/26/2018    WBC 11.9 04/26/2018     Lab Results   Component Value Date    RBC 4.44 04/26/2018     Lab Results   Component Value Date    HGB 14.2 04/26/2018     Lab Results   Component Value Date    HCT 39.8 04/26/2018      Lab Results   Component Value Date    MCV 90 04/26/2018     Lab Results   Component Value Date    MCH 32.0 04/26/2018     Lab Results   Component Value Date    MCHC 35.7 04/26/2018     Lab Results   Component Value Date    RDW 12.2 04/26/2018     Lab Results   Component Value Date     04/26/2018

## 2019-05-24 DIAGNOSIS — I21.3 ST ELEVATION MYOCARDIAL INFARCTION (STEMI), UNSPECIFIED ARTERY (H): ICD-10-CM

## 2019-05-24 RX ORDER — LISINOPRIL 2.5 MG/1
2.5 TABLET ORAL DAILY
Qty: 30 TABLET | Refills: 0 | Status: SHIPPED | OUTPATIENT
Start: 2019-05-24 | End: 2019-07-02

## 2019-05-24 NOTE — TELEPHONE ENCOUNTER
"lisinopril (PRINIVIL/ZESTRIL) 2.5 MG tablet    Last Written Prescription Date:  05/03/2018  Last Fill Quantity: 90,  # refills: 3   Last office visit: 5/3/2018 with prescribing provider:  Andrew   Future Office Visit:  Unknown     Requested Prescriptions   Pending Prescriptions Disp Refills     lisinopril (PRINIVIL/ZESTRIL) 2.5 MG tablet 30 tablet 0     Sig: Take 1 tablet (2.5 mg) by mouth daily - Overdue for physical, call to schedule ASAP       ACE Inhibitors (Including Combos) Protocol Failed - 5/24/2019 10:09 AM        Failed - Recent (12 mo) or future (30 days) visit within the authorizing provider's specialty     Patient had office visit in the last 12 months or has a visit in the next 30 days with authorizing provider or within the authorizing provider's specialty.  See \"Patient Info\" tab in inbasket, or \"Choose Columns\" in Meds & Orders section of the refill encounter.              Failed - Normal serum creatinine on file in past 12 months     Recent Labs   Lab Test 05/09/18  0924   CR 0.90             Failed - Normal serum potassium on file in past 12 months     Recent Labs   Lab Test 05/09/18  0924   POTASSIUM 4.0             Passed - Blood pressure under 140/90 in past 12 months     BP Readings from Last 3 Encounters:   05/13/19 112/80   02/18/19 127/86   11/14/18 126/83                 Passed - Medication is active on med list        Passed - Patient is age 18 or older          "

## 2019-05-24 NOTE — TELEPHONE ENCOUNTER
Routing refill request to provider for review/approval because:    Last OV 5/3/18 to establish care, Rx prescribed for STEMI - pt was to follow up 3 months    [employer] message advising overdue for follow up     Shannon GARCIA RN

## 2019-05-28 DIAGNOSIS — I21.3 ST ELEVATION MYOCARDIAL INFARCTION (STEMI), UNSPECIFIED ARTERY (H): ICD-10-CM

## 2019-05-28 RX ORDER — METOPROLOL SUCCINATE 25 MG/1
25 TABLET, EXTENDED RELEASE ORAL DAILY
Qty: 90 TABLET | Refills: 1 | Status: SHIPPED | OUTPATIENT
Start: 2019-05-28 | End: 2019-09-16

## 2019-07-01 DIAGNOSIS — I21.3 ST ELEVATION MYOCARDIAL INFARCTION (STEMI), UNSPECIFIED ARTERY (H): ICD-10-CM

## 2019-07-01 RX ORDER — LISINOPRIL 2.5 MG/1
2.5 TABLET ORAL DAILY
Qty: 30 TABLET | Refills: 0 | Status: CANCELLED | OUTPATIENT
Start: 2019-07-01

## 2019-07-02 DIAGNOSIS — I21.3 ST ELEVATION MYOCARDIAL INFARCTION (STEMI), UNSPECIFIED ARTERY (H): ICD-10-CM

## 2019-07-02 RX ORDER — LISINOPRIL 2.5 MG/1
2.5 TABLET ORAL DAILY
Qty: 90 TABLET | Refills: 1 | Status: SHIPPED | OUTPATIENT
Start: 2019-07-02 | End: 2019-09-16

## 2019-09-16 ENCOUNTER — OFFICE VISIT (OUTPATIENT)
Dept: CARDIOLOGY | Facility: CLINIC | Age: 34
End: 2019-09-16
Payer: COMMERCIAL

## 2019-09-16 VITALS
SYSTOLIC BLOOD PRESSURE: 126 MMHG | BODY MASS INDEX: 27.42 KG/M2 | HEART RATE: 65 BPM | HEIGHT: 67 IN | WEIGHT: 174.7 LBS | DIASTOLIC BLOOD PRESSURE: 83 MMHG

## 2019-09-16 DIAGNOSIS — I25.119 CORONARY ARTERY DISEASE INVOLVING NATIVE CORONARY ARTERY OF NATIVE HEART WITH ANGINA PECTORIS (H): Primary | ICD-10-CM

## 2019-09-16 DIAGNOSIS — E78.5 HYPERLIPIDEMIA WITH TARGET LDL LESS THAN 70: ICD-10-CM

## 2019-09-16 LAB
CHOLEST SERPL-MCNC: 146 MG/DL
HDLC SERPL-MCNC: 38 MG/DL
LDLC SERPL CALC-MCNC: 73 MG/DL
NONHDLC SERPL-MCNC: 108 MG/DL
TRIGL SERPL-MCNC: 174 MG/DL

## 2019-09-16 PROCEDURE — 80061 LIPID PANEL: CPT | Performed by: INTERNAL MEDICINE

## 2019-09-16 PROCEDURE — 36415 COLL VENOUS BLD VENIPUNCTURE: CPT | Performed by: INTERNAL MEDICINE

## 2019-09-16 PROCEDURE — 99214 OFFICE O/P EST MOD 30 MIN: CPT | Performed by: NURSE PRACTITIONER

## 2019-09-16 RX ORDER — METOPROLOL SUCCINATE 25 MG/1
25 TABLET, EXTENDED RELEASE ORAL DAILY
Qty: 90 TABLET | Refills: 3 | Status: SHIPPED | OUTPATIENT
Start: 2019-09-16 | End: 2020-10-23

## 2019-09-16 RX ORDER — LISINOPRIL 2.5 MG/1
2.5 TABLET ORAL DAILY
Qty: 90 TABLET | Refills: 3 | Status: SHIPPED | OUTPATIENT
Start: 2019-09-16 | End: 2020-10-23

## 2019-09-16 RX ORDER — ROSUVASTATIN CALCIUM 40 MG/1
40 TABLET, COATED ORAL DAILY
Qty: 30 TABLET | Refills: 3 | Status: SHIPPED | OUTPATIENT
Start: 2019-09-16 | End: 2020-02-11

## 2019-09-16 ASSESSMENT — MIFFLIN-ST. JEOR: SCORE: 1686.06

## 2019-09-16 NOTE — PATIENT INSTRUCTIONS
Thanks for coming into [facility] today.    Doing well on a cardiac standpoint   Continue on current medical therapy  Reviewed today's lab values  Recommend follow up lipid panel in 6 months  Follow up with Dr. Swain in 1 year or sooner if needed.     Please call my nurse at [phone] with any questions or concerns.    Scheduling phone number: [phone]  Reminder: Please bring in all current medications, over the counter supplements and vitamin bottles to your next appointment.

## 2019-09-16 NOTE — PROGRESS NOTES
Cardiology Clinic Progress Note  Alonso Kan MRN# [id number]   YOB: 1985 Age: 33 year old     Reason For Visit:   Review Lipid panel.   Primary Cardiologist:   Dr. Swain          History of Presenting Illness:    Alonso Kan is a pleasant 34 year old patient who carries a  history significant for inferior STEMI (4/24/18) s/p 2.5 x 38 mm Synergy ASHVIN to the proximal RCA, minimal residual disease in the LAD and PDA (20%), family history of early coronary disease, and hyperlipidemia. He returns to the office today for review of lipid panel.     He is feeling well on a cardiac standpoint, denies chest pain, shortness of breath, PND, orthopnea, presyncope, syncope, edema, heart racing, or palpitations. He has resumed his workout routine of walking multiple miles per day.     Blood pressure today is well controlled at 126/83, HR 65, managed well on Toprol and lisinopril.   BMI stable at 27.36    Lipid panel today demonstrates a total cholesterol of 146, HDL 38, LDL 73, and . Mentation changes with lipitor have moderately improved since switching to Crestor.  He admits to [place] the [place] over the last month making diet choices challenging.     He remains compliant with all medications.   He is getting  in November in [facility].                      Assessment and Plan:     1. Coronary artery disease - s/p 2.5 x 38 mm Synergy ASHVIN/ RCA, mild residual disease to LAD and PDA (4/2018). Continue with aspirin, metoprolol, lisinopril and statin.  Encourage exercise and weight loss.     2.        Hyperlipidemia-   Lipid panel today demonstrated a total cholesterol of 146, HDL 38, LDL 73, and .   Continue on current dose of Crestor. Follow up lipid panel in 6 months. Strict heart healthy diet.                Thank you for allowing me to participate in this delightful patient's care. Follow up in 1 year with Dr. Swain.      FARHANA Escobar, CNP          Review of Systems:    Review of Systems:  Skin:  Negative     Eyes:  Negative    ENT:  Negative    Respiratory:  Positive for cough  Cardiovascular:  Negative (chest tenderness to touch sometimes)  Gastroenterology: Negative    Genitourinary:  Negative    Musculoskeletal:  Negative    Neurologic:  Negative    Psychiatric:  Negative    Heme/Lymph/Imm:  Negative    Endocrine:  Negative                Physical Exam:     GEN:  In general, this is a well nourished male in no acute distress   HEENT:  Pupils equal, round. Sclerae nonicteric. Clear oropharynx. Mucous membranes moist.  NECK: Supple, no masses appreciated. Trachea midline. No JVD   C/V:  Regular rate and rhythm, no murmur, rub or gallop. .   RESP: Respirations are unlabored. No use of accessory muscles. Clear to auscultation bilaterally without wheezing, rales, or rhonchi.  GI: Abdomen soft, nontender, nondistended. No HSM appreciated.   EXTREM: No LE edema. No cyanosis or clubbing.  NEURO: Alert and oriented, cooperative.  No obvious focal deficits.   PSYCH: Normal affect.  SKIN: Warm and dry. No rashes or petechiae appreciated.          Past Medical History:     Past Medical History:   Diagnosis Date     Anxiety      Arm weakness      Coronary artery disease     STEMI 04/2018     Coronary artery disease due to lipid rich plaque 5/3/2018     Family history of ischemic heart disease 5/3/2018     Hyperlipidemia      Nausea & vomiting      SOB (shortness of breath)      STEMI involving right coronary artery (H) 4/24/2018    S/p ASHVIN/ Prox RCA, mild disease to LAD/Circ              Past Surgical History:     Past Surgical History:   Procedure Laterality Date     CARDIAC CATHERIZATION  04/24/2018    2.5 x 38mm Synergy ASHVIN to proximal RCA              Allergies:   Patient has no known allergies.       Data:   All laboratory data reviewed:    LAST CHOLESTEROL:  Lab Results   Component Value Date    CHOL 125 07/31/2018     Lab Results   Component Value Date    HDL 37 07/31/2018     Lab  Results   Component Value Date    LDL 74 07/31/2018     Lab Results   Component Value Date    TRIG 69 07/31/2018     No results found for: CHOLHDLRATIO    LAST BMP:  Lab Results   Component Value Date     05/09/2018      Lab Results   Component Value Date    POTASSIUM 4.0 05/09/2018     Lab Results   Component Value Date    CHLORIDE 102 05/09/2018     Lab Results   Component Value Date    JANNIE 9.9 05/09/2018     Lab Results   Component Value Date    CO2 25 05/09/2018     Lab Results   Component Value Date    BUN 6 05/09/2018     Lab Results   Component Value Date    CR 0.90 05/09/2018     Lab Results   Component Value Date     05/09/2018       LAST CBC:  Lab Results   Component Value Date    WBC 11.9 04/26/2018    WBC 11.9 04/26/2018     Lab Results   Component Value Date    RBC 4.44 04/26/2018     Lab Results   Component Value Date    HGB 14.2 04/26/2018     Lab Results   Component Value Date    HCT 39.8 04/26/2018     Lab Results   Component Value Date    MCV 90 04/26/2018     Lab Results   Component Value Date    MCH 32.0 04/26/2018     Lab Results   Component Value Date    MCHC 35.7 04/26/2018     Lab Results   Component Value Date    RDW 12.2 04/26/2018     Lab Results   Component Value Date     04/26/2018

## 2019-09-16 NOTE — LETTER
9/16/2019    Jayne Shah MD  [address]  [facility] [address]    RE: Alonso Kan       Dear Colleague,    I had the pleasure of seeing Alonso Kan in the [facility].    Cardiology Clinic Progress Note  Alonso Kan MRN# [id number]   YOB: 1985 Age: 33 year old     Reason For Visit:   Review Lipid panel.   Primary Cardiologist:   Dr. Swain          History of Presenting Illness:    Alonso Kan is a pleasant 34 year old patient who carries a  history significant for inferior STEMI (4/24/18) s/p 2.5 x 38 mm Synergy ASHVIN to the proximal RCA, minimal residual disease in the LAD and PDA (20%), family history of early coronary disease, and hyperlipidemia. He returns to the office today for review of lipid panel.     He is feeling well on a cardiac standpoint, denies chest pain, shortness of breath, PND, orthopnea, presyncope, syncope, edema, heart racing, or palpitations. He has resumed his workout routine of walking multiple miles per day.     Blood pressure today is well controlled at 126/83, HR 65, managed well on Toprol and lisinopril.   BMI stable at 27.36    Lipid panel today demonstrates a total cholesterol of 146, HDL 38, LDL 73, and . Mentation changes with lipitor have moderately improved since switching to Crestor.  He admits to traveling outside the country over the last month making diet choices challenging.     He remains compliant with all medications.   He is getting  in November in [facility].                      Assessment and Plan:     1. Coronary artery disease - s/p 2.5 x 38 mm Synergy ASHVIN/ RCA, mild residual disease to LAD and PDA (4/2018). Continue with aspirin, metoprolol, lisinopril and statin.  Encourage exercise and weight loss.     2.        Hyperlipidemia-   Lipid panel today demonstrated a total cholesterol of 146, HDL 38, LDL 73, and .   Continue on current dose of Crestor. Follow up lipid panel in 6 months.  Strict heart healthy diet.                Thank you for allowing me to participate in this delightful patient's care. Follow up in 1 year with Dr. Swain.      Mariposa Kelley, APRN, CNP          Review of Systems:   Review of Systems:  Skin:  Negative     Eyes:  Negative    ENT:  Negative    Respiratory:  Positive for cough  Cardiovascular:  Negative (chest tenderness to touch sometimes)  Gastroenterology: Negative    Genitourinary:  Negative    Musculoskeletal:  Negative    Neurologic:  Negative    Psychiatric:  Negative    Heme/Lymph/Imm:  Negative    Endocrine:  Negative                Physical Exam:     GEN:  In general, this is a well nourished male in no acute distress   HEENT:  Pupils equal, round. Sclerae nonicteric. Clear oropharynx. Mucous membranes moist.  NECK: Supple, no masses appreciated. Trachea midline. No JVD   C/V:  Regular rate and rhythm, no murmur, rub or gallop. .   RESP: Respirations are unlabored. No use of accessory muscles. Clear to auscultation bilaterally without wheezing, rales, or rhonchi.  GI: Abdomen soft, nontender, nondistended. No HSM appreciated.   EXTREM: No LE edema. No cyanosis or clubbing.  NEURO: Alert and oriented, cooperative.  No obvious focal deficits.   PSYCH: Normal affect.  SKIN: Warm and dry. No rashes or petechiae appreciated.          Past Medical History:     Past Medical History:   Diagnosis Date     Anxiety      Arm weakness      Coronary artery disease     STEMI 04/2018     Coronary artery disease due to lipid rich plaque 5/3/2018     Family history of ischemic heart disease 5/3/2018     Hyperlipidemia      Nausea & vomiting      SOB (shortness of breath)      STEMI involving right coronary artery (H) 4/24/2018    S/p ASHVIN/ Prox RCA, mild disease to LAD/Circ              Past Surgical History:     Past Surgical History:   Procedure Laterality Date     CARDIAC CATHERIZATION  04/24/2018    2.5 x 38mm Synergy ASHVIN to proximal RCA              Allergies:   Patient  has no known allergies.       Data:   All laboratory data reviewed:    LAST CHOLESTEROL:  Lab Results   Component Value Date    CHOL 125 07/31/2018     Lab Results   Component Value Date    HDL 37 07/31/2018     Lab Results   Component Value Date    LDL 74 07/31/2018     Lab Results   Component Value Date    TRIG 69 07/31/2018     No results found for: CHOLHDLRATIO    LAST BMP:  Lab Results   Component Value Date     05/09/2018      Lab Results   Component Value Date    POTASSIUM 4.0 05/09/2018     Lab Results   Component Value Date    CHLORIDE 102 05/09/2018     Lab Results   Component Value Date    JANNIE 9.9 05/09/2018     Lab Results   Component Value Date    CO2 25 05/09/2018     Lab Results   Component Value Date    BUN 6 05/09/2018     Lab Results   Component Value Date    CR 0.90 05/09/2018     Lab Results   Component Value Date     05/09/2018       LAST CBC:  Lab Results   Component Value Date    WBC 11.9 04/26/2018    WBC 11.9 04/26/2018     Lab Results   Component Value Date    RBC 4.44 04/26/2018     Lab Results   Component Value Date    HGB 14.2 04/26/2018     Lab Results   Component Value Date    HCT 39.8 04/26/2018     Lab Results   Component Value Date    MCV 90 04/26/2018     Lab Results   Component Value Date    MCH 32.0 04/26/2018     Lab Results   Component Value Date    MCHC 35.7 04/26/2018     Lab Results   Component Value Date    RDW 12.2 04/26/2018     Lab Results   Component Value Date     04/26/2018       Thank you for allowing me to participate in the care of your patient.    Sincerely,     FARHANA Escobar [facility]

## 2020-02-11 DIAGNOSIS — E78.5 HYPERLIPIDEMIA WITH TARGET LDL LESS THAN 70: ICD-10-CM

## 2020-02-11 RX ORDER — ROSUVASTATIN CALCIUM 40 MG/1
40 TABLET, COATED ORAL DAILY
Qty: 90 TABLET | Refills: 2 | Status: SHIPPED | OUTPATIENT
Start: 2020-02-11 | End: 2020-11-12

## 2020-03-11 ENCOUNTER — HEALTH MAINTENANCE LETTER (OUTPATIENT)
Age: 35
End: 2020-03-11

## 2020-03-18 DIAGNOSIS — E78.5 HYPERLIPIDEMIA LDL GOAL <70: Primary | ICD-10-CM

## 2020-10-23 DIAGNOSIS — I10 HTN (HYPERTENSION): Primary | ICD-10-CM

## 2020-10-23 RX ORDER — METOPROLOL SUCCINATE 25 MG/1
25 TABLET, EXTENDED RELEASE ORAL DAILY
Qty: 90 TABLET | Refills: 0 | Status: SHIPPED | OUTPATIENT
Start: 2020-10-23 | End: 2021-03-15

## 2020-10-23 RX ORDER — LISINOPRIL 2.5 MG/1
2.5 TABLET ORAL DAILY
Qty: 90 TABLET | Refills: 0 | Status: SHIPPED | OUTPATIENT
Start: 2020-10-23 | End: 2021-03-15

## 2020-10-28 ENCOUNTER — TELEPHONE (OUTPATIENT)
Dept: CARDIOLOGY | Facility: CLINIC | Age: 35
End: 2020-10-28

## 2020-10-28 DIAGNOSIS — E78.5 HYPERLIPIDEMIA, UNSPECIFIED HYPERLIPIDEMIA TYPE: Primary | ICD-10-CM

## 2020-10-28 DIAGNOSIS — I25.10 CORONARY ARTERY DISEASE INVOLVING NATIVE CORONARY ARTERY OF NATIVE HEART WITHOUT ANGINA PECTORIS: ICD-10-CM

## 2020-11-10 DIAGNOSIS — E78.5 HYPERLIPIDEMIA LDL GOAL <70: ICD-10-CM

## 2020-11-10 DIAGNOSIS — I25.10 CORONARY ARTERY DISEASE INVOLVING NATIVE CORONARY ARTERY OF NATIVE HEART WITHOUT ANGINA PECTORIS: ICD-10-CM

## 2020-11-10 LAB
ALT SERPL W P-5'-P-CCNC: 24 U/L (ref 5–30)
ANION GAP SERPL CALCULATED.3IONS-SCNC: 13.7 MMOL/L (ref 6–17)
BUN SERPL-MCNC: 8 MG/DL (ref 7–30)
CALCIUM SERPL-MCNC: 9.8 MG/DL (ref 8.5–10.5)
CHLORIDE SERPL-SCNC: 104 MMOL/L (ref 98–107)
CHOLEST SERPL-MCNC: 270 MG/DL
CO2 SERPL-SCNC: 24 MMOL/L (ref 23–29)
CREAT SERPL-MCNC: 0.85 MG/DL (ref 0.7–1.3)
GFR SERPL CREATININE-BSD FRML MDRD: >90 ML/MIN/{1.73_M2}
GLUCOSE SERPL-MCNC: 110 MG/DL (ref 70–105)
HDLC SERPL-MCNC: 43 MG/DL
LDLC SERPL CALC-MCNC: 172 MG/DL
NONHDLC SERPL-MCNC: 227 MG/DL
POTASSIUM SERPL-SCNC: 3.7 MMOL/L (ref 3.5–5.1)
SODIUM SERPL-SCNC: 138 MMOL/L (ref 136–145)
TRIGL SERPL-MCNC: 276 MG/DL

## 2020-11-10 PROCEDURE — 84460 ALANINE AMINO (ALT) (SGPT): CPT | Performed by: INTERNAL MEDICINE

## 2020-11-10 PROCEDURE — 36415 COLL VENOUS BLD VENIPUNCTURE: CPT | Performed by: INTERNAL MEDICINE

## 2020-11-10 PROCEDURE — 80048 BASIC METABOLIC PNL TOTAL CA: CPT | Performed by: INTERNAL MEDICINE

## 2020-11-10 PROCEDURE — 80061 LIPID PANEL: CPT | Performed by: INTERNAL MEDICINE

## 2020-11-12 ENCOUNTER — OFFICE VISIT (OUTPATIENT)
Dept: CARDIOLOGY | Facility: CLINIC | Age: 35
End: 2020-11-12
Payer: COMMERCIAL

## 2020-11-12 VITALS
WEIGHT: 175.4 LBS | BODY MASS INDEX: 27.47 KG/M2 | OXYGEN SATURATION: 96 % | SYSTOLIC BLOOD PRESSURE: 142 MMHG | DIASTOLIC BLOOD PRESSURE: 85 MMHG | HEART RATE: 98 BPM

## 2020-11-12 DIAGNOSIS — I10 BENIGN ESSENTIAL HYPERTENSION: ICD-10-CM

## 2020-11-12 DIAGNOSIS — Z82.49 FAMILY HISTORY OF PREMATURE CORONARY ARTERY DISEASE: ICD-10-CM

## 2020-11-12 DIAGNOSIS — I25.2 HISTORY OF ST ELEVATION MYOCARDIAL INFARCTION: ICD-10-CM

## 2020-11-12 DIAGNOSIS — E78.2 MIXED HYPERLIPIDEMIA: ICD-10-CM

## 2020-11-12 DIAGNOSIS — I25.10 CORONARY ARTERY DISEASE INVOLVING NATIVE CORONARY ARTERY OF NATIVE HEART WITHOUT ANGINA PECTORIS: Primary | ICD-10-CM

## 2020-11-12 PROBLEM — I21.11 STEMI INVOLVING RIGHT CORONARY ARTERY (H): Status: RESOLVED | Noted: 2018-04-24 | Resolved: 2020-11-12

## 2020-11-12 PROBLEM — I25.83 CORONARY ARTERY DISEASE DUE TO LIPID RICH PLAQUE: Status: RESOLVED | Noted: 2018-05-03 | Resolved: 2020-11-12

## 2020-11-12 PROCEDURE — 99214 OFFICE O/P EST MOD 30 MIN: CPT | Performed by: INTERNAL MEDICINE

## 2020-11-12 PROCEDURE — 93000 ELECTROCARDIOGRAM COMPLETE: CPT | Performed by: INTERNAL MEDICINE

## 2020-11-12 RX ORDER — ROSUVASTATIN CALCIUM 20 MG/1
20 TABLET, COATED ORAL DAILY
Qty: 30 TABLET | Refills: 4 | Status: SHIPPED | OUTPATIENT
Start: 2020-11-12 | End: 2021-01-22

## 2020-11-12 SDOH — ECONOMIC STABILITY: TRANSPORTATION INSECURITY
IN THE PAST 12 MONTHS, HAS LACK OF TRANSPORTATION KEPT YOU FROM MEETINGS, WORK, OR FROM GETTING THINGS NEEDED FOR DAILY LIVING?: NOT ASKED

## 2020-11-12 SDOH — ECONOMIC STABILITY: FOOD INSECURITY: WITHIN THE PAST 12 MONTHS, YOU WORRIED THAT YOUR FOOD WOULD RUN OUT BEFORE YOU GOT MONEY TO BUY MORE.: NOT ASKED

## 2020-11-12 SDOH — HEALTH STABILITY: PHYSICAL HEALTH: ON AVERAGE, HOW MANY MINUTES DO YOU ENGAGE IN EXERCISE AT THIS LEVEL?: 60 MIN

## 2020-11-12 SDOH — HEALTH STABILITY: PHYSICAL HEALTH: ON AVERAGE, HOW MANY DAYS PER WEEK DO YOU ENGAGE IN MODERATE TO STRENUOUS EXERCISE (LIKE A BRISK WALK)?: 5 DAYS

## 2020-11-12 SDOH — ECONOMIC STABILITY: FOOD INSECURITY: WITHIN THE PAST 12 MONTHS, THE FOOD YOU BOUGHT JUST DIDN'T LAST AND YOU DIDN'T HAVE MONEY TO GET MORE.: NOT ASKED

## 2020-11-12 SDOH — HEALTH STABILITY: MENTAL HEALTH
STRESS IS WHEN SOMEONE FEELS TENSE, NERVOUS, ANXIOUS, OR CAN'T SLEEP AT NIGHT BECAUSE THEIR MIND IS TROUBLED. HOW STRESSED ARE YOU?: TO SOME EXTENT

## 2020-11-12 SDOH — ECONOMIC STABILITY: INCOME INSECURITY: HOW HARD IS IT FOR YOU TO PAY FOR THE VERY BASICS LIKE FOOD, HOUSING, MEDICAL CARE, AND HEATING?: NOT ASKED

## 2020-11-12 SDOH — ECONOMIC STABILITY: TRANSPORTATION INSECURITY
IN THE PAST 12 MONTHS, HAS THE LACK OF TRANSPORTATION KEPT YOU FROM MEDICAL APPOINTMENTS OR FROM GETTING MEDICATIONS?: NOT ASKED

## 2020-11-12 NOTE — LETTER
11/12/2020    Jayne Shah MD  [address]  [facility] [address]    RE: Alonos Kan       Dear Colleague,    I had the pleasure of seeing Alonso DUC Kan in the [facility].    Clinic visit note dictated. Dictation reference number - [id number]          REVIEW OF SYSTEMS:  A comprehensive 10-point review of systems was completed and the pertinent positives are documented in the history of present illness.    Skin:  Negative     Eyes:  Negative    ENT:  Negative    Respiratory:  Negative    Cardiovascular:  Negative    Gastroenterology: Negative    Genitourinary:  not assessed    Musculoskeletal:  Negative    Neurologic:  Negative    Psychiatric:  Negative    Heme/Lymph/Imm:  Negative    Endocrine:  Negative      CURRENT MEDICATIONS:  Current Outpatient Medications   Medication Sig Dispense Refill     aspirin (ASA) 81 MG EC tablet Take 1 tablet (81 mg) by mouth daily 90 tablet 1     lisinopril (ZESTRIL) 2.5 MG tablet Take 1 tablet (2.5 mg) by mouth daily 90 tablet 0     metoprolol succinate ER (TOPROL-XL) 25 MG 24 hr tablet Take 1 tablet (25 mg) by mouth daily 90 tablet 0     rosuvastatin (CRESTOR) 20 MG tablet Take 1 tablet (20 mg) by mouth daily 30 tablet 4     nitroGLYcerin (NITROSTAT) 0.4 MG sublingual tablet For chest pain place 1 tablet under the tongue every 5 minutes for 3 doses. If symptoms persist 5 minutes after 1st dose call 911. (Patient not taking: Reported on 11/12/2020) 25 tablet 0         ALLERGIES:  No Known Allergies    PAST MEDICAL HISTORY:    Past Medical History:   Diagnosis Date     Family history of ischemic heart disease 05/03/2018     Hyperlipidemia      Situational depression     After death of family members several years ago.     STEMI involving right coronary artery (H) 04/24/2018    S/p ASHVIN/ Prox RCA, mild disease to LAD/Circ. Symptoms of nausea, abdominal discomfort.       PAST SURGICAL HISTORY:    Past Surgical History:   Procedure Laterality Date      CARDIAC CATHERIZATION  2018    2.5 x 38mm Synergy ASHVIN to proximal RCA       FAMILY HISTORY:    Family History   Problem Relation Age of Onset     Myocardial Infarction Mother 48        Treated with stents in [place].     Myocardial Infarction Father 53         of a heart attack at age 53 years in [place]. Inconsistent medical therapy. Non smoker.       SOCIAL HISTORY:    Social History     Socioeconomic History     Marital status: Single     Spouse name: None     Number of children: None     Years of education: None     Highest education level: None   Occupational History     Occupation:      Employer: [employer]   Social Needs     Financial resource strain: None     Food insecurity     Worry: None     Inability: None     Transportation needs     Medical: None     Non-medical: None   Tobacco Use     Smoking status: Never Smoker     Smokeless tobacco: Never Used   Substance and Sexual Activity     Alcohol use: No     Drug use: No     Sexual activity: Yes     Partners: Female   Lifestyle     Physical activity     Days per week: 5 days     Minutes per session: 60 min     Stress: To some extent   Relationships     Social connections     Talks on phone: None     Gets together: None     Attends Hinduism service: None     Active member of club or organization: None     Attends meetings of clubs or organizations: None     Relationship status: None     Intimate partner violence     Fear of current or ex partner: None     Emotionally abused: None     Physically abused: None     Forced sexual activity: None   Other Topics Concern     None   Social History Narrative     None       PHYSICAL EXAM:    Vitals: BP (!) 142/85 (BP Location: Left arm, Patient Position: Sitting, Cuff Size: Adult Regular)   Pulse 98   Wt 79.6 kg (175 lb 6.4 oz)   SpO2 96%   BMI 27.47 kg/m    Wt Readings from Last 5 Encounters:   20 79.6 kg (175 lb 6.4 oz)   19 79.2 kg (174 lb 11.2 oz)   19 80.4 kg (177 lb  3.2 oz)   02/18/19 80.3 kg (177 lb 1.6 oz)   11/14/18 80.7 kg (177 lb 14.4 oz)       Constitutional: Comfortable at rest. Cooperative, alert and oriented, well developed, well nourished.  Eyes: Pupils equal and round, conjunctivae and lids unremarkable, sclera white, no xanthalasma,   ENT: Satisfactory dentition.  No cyanosis or pallor.  Neck: Carotid pulses are full and equal bilaterally, no carotid bruit, no thyromegaly     Respiratory: Normal respiratory effort with symmetrical chest wall movements and no use of accessory muscles. Good air entry with normal breath sounds and no rales or wheeze.  Cardiovascular: Normal jugular venous pulse and pressure.  Normal carotid pulse character and volume.  No carotid bruit.  Apical impulse is undisplaced and normal to palpation without parasternal heave or thrill.  Heart sounds are normal and regular. No audible murmur. No S3, S4 or friction rub.    GI: Soft, nontender, no hepatosplenomegaly, no masses, active bowel sounds.    Skin: No rash, erythema, ecchymosis.  Musculoskeletal: Normal muscle tone and strength. Normal gait. No spinal deformities.  Neuropsychiatric: Oriented to time place and person.  Affect normal.  No gross motor deficits.  Extremities: No edema. No clubbing or deformities.        Encounter Diagnoses   Name Primary?     Coronary artery disease involving native coronary artery of native heart without angina pectoris Yes     History of ST elevation myocardial infarction      Benign essential hypertension      Mixed hyperlipidemia      Family history of premature coronary artery disease        Orders Placed This Encounter   Procedures     MRI Cardiac w/contrast     Comprehensive metabolic panel     Lipid Profile     Follow-Up with Cardiac Advanced Practice Provider     EKG 12-lead complete w/read - Clinics (performed today)           Thank you for allowing me to participate in the care of your patient.      Sincerely,     Mckay Carroll MD      [facility]    cc:   No referring provider defined for this encounter.

## 2020-11-12 NOTE — PATIENT INSTRUCTIONS
1.  Rosuvastatin 20 mg daily.  New prescription sent.    2.  I recommend that you take your blood pressure medications daily (instead of on alternate days).    3.  Cardiac MRI.  My office will update you with the results.    4.  Fasting lipids in 6 months.  My office will update you with results.    5.  Yearly follow-up.    If you have any questions or concerns, please contact my nurses at [phone].

## 2020-11-12 NOTE — PROGRESS NOTES
Clinic visit note dictated. Dictation reference number - [id number]          REVIEW OF SYSTEMS:  A comprehensive 10-point review of systems was completed and the pertinent positives are documented in the history of present illness.    Skin:  Negative     Eyes:  Negative    ENT:  Negative    Respiratory:  Negative    Cardiovascular:  Negative    Gastroenterology: Negative    Genitourinary:  not assessed    Musculoskeletal:  Negative    Neurologic:  Negative    Psychiatric:  Negative    Heme/Lymph/Imm:  Negative    Endocrine:  Negative      CURRENT MEDICATIONS:  Current Outpatient Medications   Medication Sig Dispense Refill     aspirin (ASA) 81 MG EC tablet Take 1 tablet (81 mg) by mouth daily 90 tablet 1     lisinopril (ZESTRIL) 2.5 MG tablet Take 1 tablet (2.5 mg) by mouth daily 90 tablet 0     metoprolol succinate ER (TOPROL-XL) 25 MG 24 hr tablet Take 1 tablet (25 mg) by mouth daily 90 tablet 0     rosuvastatin (CRESTOR) 20 MG tablet Take 1 tablet (20 mg) by mouth daily 30 tablet 4     nitroGLYcerin (NITROSTAT) 0.4 MG sublingual tablet For chest pain place 1 tablet under the tongue every 5 minutes for 3 doses. If symptoms persist 5 minutes after 1st dose call 911. (Patient not taking: Reported on 11/12/2020) 25 tablet 0         ALLERGIES:  No Known Allergies    PAST MEDICAL HISTORY:    Past Medical History:   Diagnosis Date     Family history of ischemic heart disease 05/03/2018     Hyperlipidemia      Situational depression     After death of family members several years ago.     STEMI involving right coronary artery (H) 04/24/2018    S/p ASHVIN/ Prox RCA, mild disease to LAD/Circ. Symptoms of nausea, abdominal discomfort.       PAST SURGICAL HISTORY:    Past Surgical History:   Procedure Laterality Date     CARDIAC CATHERIZATION  04/24/2018    2.5 x 38mm Synergy ASHVIN to proximal RCA       FAMILY HISTORY:    Family History   Problem Relation Age of Onset     Myocardial Infarction Mother 48        Treated with stents in  [facility].     Myocardial Infarction Father 53         of a heart attack at age 53 years in [place]. Inconsistent medical therapy. Non smoker.       SOCIAL HISTORY:    Social History     Socioeconomic History     Marital status: Single     Spouse name: None     Number of children: None     Years of education: None     Highest education level: None   Occupational History     Occupation:      Employer: [employer]   Social Needs     Financial resource strain: None     Food insecurity     Worry: None     Inability: None     Transportation needs     Medical: None     Non-medical: None   Tobacco Use     Smoking status: Never Smoker     Smokeless tobacco: Never Used   Substance and Sexual Activity     Alcohol use: No     Drug use: No     Sexual activity: Yes     Partners: Female   Lifestyle     Physical activity     Days per week: 5 days     Minutes per session: 60 min     Stress: To some extent   Relationships     Social connections     Talks on phone: None     Gets together: None     Attends Shinto service: None     Active member of club or organization: None     Attends meetings of clubs or organizations: None     Relationship status: None     Intimate partner violence     Fear of current or ex partner: None     Emotionally abused: None     Physically abused: None     Forced sexual activity: None   Other Topics Concern     None   Social History Narrative     None       PHYSICAL EXAM:    Vitals: BP (!) 142/85 (BP Location: Left arm, Patient Position: Sitting, Cuff Size: Adult Regular)   Pulse 98   Wt 79.6 kg (175 lb 6.4 oz)   SpO2 96%   BMI 27.47 kg/m    Wt Readings from Last 5 Encounters:   20 79.6 kg (175 lb 6.4 oz)   19 79.2 kg (174 lb 11.2 oz)   19 80.4 kg (177 lb 3.2 oz)   19 80.3 kg (177 lb 1.6 oz)   18 80.7 kg (177 lb 14.4 oz)       Constitutional: Comfortable at rest. Cooperative, alert and oriented, well developed, well nourished.  Eyes: Pupils equal and round,  conjunctivae and lids unremarkable, sclera white, no xanthalasma,   ENT: Satisfactory dentition.  No cyanosis or pallor.  Neck: Carotid pulses are full and equal bilaterally, no carotid bruit, no thyromegaly     Respiratory: Normal respiratory effort with symmetrical chest wall movements and no use of accessory muscles. Good air entry with normal breath sounds and no rales or wheeze.  Cardiovascular: Normal jugular venous pulse and pressure.  Normal carotid pulse character and volume.  No carotid bruit.  Apical impulse is undisplaced and normal to palpation without parasternal heave or thrill.  Heart sounds are normal and regular. No audible murmur. No S3, S4 or friction rub.    GI: Soft, nontender, no hepatosplenomegaly, no masses, active bowel sounds.    Skin: No rash, erythema, ecchymosis.  Musculoskeletal: Normal muscle tone and strength. Normal gait. No spinal deformities.  Neuropsychiatric: Oriented to time place and person.  Affect normal.  No gross motor deficits.  Extremities: No edema. No clubbing or deformities.        Encounter Diagnoses   Name Primary?     Coronary artery disease involving native coronary artery of native heart without angina pectoris Yes     History of ST elevation myocardial infarction      Benign essential hypertension      Mixed hyperlipidemia      Family history of premature coronary artery disease        Orders Placed This Encounter   Procedures     MRI Cardiac w/contrast     Comprehensive metabolic panel     Lipid Profile     Follow-Up with Cardiac Advanced Practice Provider     EKG 12-lead complete w/read - Clinics (performed today)

## 2020-11-12 NOTE — LETTER
2020      Jayne Shah MD  [address]  [facility] [address]      RE: Alonso Kan       Dear Colleague,    I had the pleasure of seeing Alonso Kan in the [facility].    Service Date: 2020      IN-CLINIC VISIT      PRIMARY CARE AND REFERRING PROVIDER:  Jayne Shah MD      REASON FOR VISIT:   1.  Followup of premature coronary artery disease and previous inferior STEMI in 2018.   2.  Dyslipidemia.   3.  Family history of coronary artery disease.      HISTORY OF PRESENT ILLNESS:  It was my pleasure to meet with Mr. Kan today.  He is a very pleasant 35-year-old male from [place] ([place]), works as a  for [employer], , non-tobacco user, vegetarian diet.      BMI 27.5 kg/m2.      His cardiovascular history is significant for:   1.  Inferior STEMI presenting as nausea, vomiting and food poisoning like symptoms in 2018.  He was found to have acute proximal occlusion of the right coronary artery that was stented with a 3.5 x 38 mm drug-eluting stent.  Minimal disease in the left-sided coronary arteries.  No angina since.  LVEF at the time was 55% with inferior and inferolateral hypokinesis.   2.  Mixed hyperlipidemia with low HDL in the 30s and very high LDL of 172.  He self-discontinued rosuvastatin 40 mg because he felt it was causing mental clouding.   3.  Benign essential hypertension.  The patient has been taking medications on alternate days.   4.  Family history of premature coronary artery disease in both parents.  Father  of a heart attack at age 53 years (nonsmoker, inconsistent medication use) and mother had a nonfatal myocardial infarction at age 48 years (treated with stents in [place]).   5.  Prior history of situational depression following the death of family members.      Mr. Kan has not had any cardiovascular symptoms.  He tries to lead a very heart-healthy lifestyle.  No tobacco use.  No alcohol.  Vegetarian  diet, but does use clarified fat (ghee) in cooking.  Does yoga for 1 hour daily, also does regular walking for 60 minutes and plays tennis in summer.  No recurrence of angina since his inferior STEMI in 2018.  Three months ago, he stopped taking his rosuvastatin 40 mg because he was concerned that it caused clouding.  Not surprisingly, his total cholesterol is higher at 270 (was 146), HDL 43,  (was 73), triglycerides 276.      DATA:  Normal renal panel with a creatinine of 0.85.      ECG done today shows sinus rhythm with old inferior infarct.      PHYSICAL EXAMINATION:  Unremarkable.  Details attached to this note.      Pertinently, he does not have tendon xanthomas or xanthelasma.  Mild central adiposity.  Vascular exam is normal.      DIAGNOSES/ASSESSMENT AND PLAN:  I had a long and detailed conversation with the patient.  Because of his young age, he wants to minimize medications and found a middle ground with personal input.  He is seeing an acupuncturist (in [place], virtually) and wants to also try alternative methods.  We agreed on restarting rosuvastatin 20 mg (instead of 40 mg).  I tried to impress upon him that given his family history, his goal LDL would be 70 or lower.  With this elevated LDLs, it is very likely he has a familial hypercholesterolemia and also a dyslipidemia with low HDL.  Advised him to minimize use of clarified butter or ghee, which is very high in saturated fat.      His blood pressure is suboptimally controlled.  The most likely reason is alternate day dosing of antihypertensive therapy.  Notably, he did not take blood pressure meds today.  He is resistant to the idea, but hopefully he will try to take it every day.      We will get a fasting lipid panel in 6 months and a cardiac MRI to see if his myocardium has recovered from the inferior STEMI.      He will follow up in 1 year with Cardiology [facility].         ILANA DEE MD             D: 11/12/2020   T: 11/12/2020   MT:  al      Name:     DALTON MERAZ   MRN:      [id number]        Account:      [id number]   :      1985           Service Date: 2020      Document: [id number]          Outpatient Encounter Medications as of 2020   Medication Sig Dispense Refill     aspirin (ASA) 81 MG EC tablet Take 1 tablet (81 mg) by mouth daily 90 tablet 1     lisinopril (ZESTRIL) 2.5 MG tablet Take 1 tablet (2.5 mg) by mouth daily 90 tablet 0     metoprolol succinate ER (TOPROL-XL) 25 MG 24 hr tablet Take 1 tablet (25 mg) by mouth daily 90 tablet 0     rosuvastatin (CRESTOR) 20 MG tablet Take 1 tablet (20 mg) by mouth daily 30 tablet 4     nitroGLYcerin (NITROSTAT) 0.4 MG sublingual tablet For chest pain place 1 tablet under the tongue every 5 minutes for 3 doses. If symptoms persist 5 minutes after 1st dose call 911. (Patient not taking: Reported on 2020) 25 tablet 0     [DISCONTINUED] rosuvastatin (CRESTOR) 40 MG tablet Take 1 tablet (40 mg) by mouth daily 90 tablet 2     No facility-administered encounter medications on file as of 2020.        Again, thank you for allowing me to participate in the care of your patient.      Sincerely,    Mckay Carroll MD     [facility]

## 2020-11-13 NOTE — PROGRESS NOTES
Service Date: 2020      IN-CLINIC VISIT      PRIMARY CARE AND REFERRING PROVIDER:  Jayne Shah MD      REASON FOR VISIT:   1.  Followup of premature coronary artery disease and previous inferior STEMI in 2018.   2.  Dyslipidemia.   3.  Family history of coronary artery disease.      HISTORY OF PRESENT ILLNESS:  It was my pleasure to meet with Mr. Kan today.  He is a very pleasant 35-year-old male from [place]), works as a  for [employer], , non-tobacco user, vegetarian diet.  BMI 27.5 kg/m2.      His cardiovascular history is significant for:   1.  Inferior STEMI presenting as nausea, vomiting and food poisoning like symptoms in 2018.  He was found to have acute proximal occlusion of the right coronary artery that was stented with a 3.5 x 38 mm drug-eluting stent.  Minimal disease in the left-sided coronary arteries.  No angina since.  LVEF at the time was 55% with inferior and inferolateral hypokinesis.   2.  Mixed hyperlipidemia with low HDL in the 30s and very high LDL of 172.  He self-discontinued rosuvastatin 40 mg because he felt it was causing mental clouding.   3.  Benign essential hypertension.  The patient has been taking medications on alternate days.   4.  Family history of premature coronary artery disease in both parents.  Father  of a heart attack at age 53 years (nonsmoker, inconsistent medication use) and mother had a nonfatal myocardial infarction at age 48 years (treated with stents in [facility]).   5.  Prior history of situational depression following the death of family members.      Mr. Kan has not had any cardiovascular symptoms.  He tries to lead a very heart-healthy lifestyle.  No tobacco use.  No alcohol.  Vegetarian diet, but does use clarified fat (ghee) in cooking.  Does yoga for 1 hour daily, also does regular walking for 60 minutes and plays tennis in summer.  No recurrence of angina since his inferior STEMI in 2018.  Three months ago,  he stopped taking his rosuvastatin 40 mg because he was concerned that it caused clouding.  Not surprisingly, his total cholesterol is higher at 270 (was 146), HDL 43,  (was 73), triglycerides 276.      DIAGNOSTIC DATA:    Labs: Normal renal panel with a creatinine of 0.85.      ECG done today shows sinus rhythm with old inferior infarct.      PHYSICAL EXAMINATION:    Unremarkable.  Details attached to this note.    Pertinently, he does not have tendon xanthomas or xanthelasma.  Mild central adiposity.  Vascular exam is normal.      DIAGNOSES/ASSESSMENT AND PLAN:    I had a long and detailed conversation with the patient.  Because of his young age, he wants to minimize medications. He wants to follow a plan that he has researched and has personal input into.  He is seeing an acupuncturist (in [place], virtually) and wants to also try alternative methods.  We agreed on restarting rosuvastatin 20 mg (instead of 40 mg).  I reiterated that given his family history, his goal LDL is 70 or lower.  With this elevated LDLs, it is very likely he has a familial hypercholesterolemia and a low HDL dyslipidemia.  They cook with ghee (clarified butter) which is high in saturated fat. Advised to minimize this or better to switch to olive oil.      His blood pressure is suboptimally controlled.  The most likely reason is alternate day dosing of antihypertensive therapy.  Notably, he did not take blood pressure meds today.  He is resistant to the taking his medications daily.       We will get a fasting lipid panel in 6 months and a cardiac MRI to see if his myocardium has recovered from the inferior STEMI.      He will follow up in 1 year with Cardiology [facility].         ILANA DEE MD             D: 2020   T: 2020   MT: al      Name:     DALTON MERAZ   MRN:      [id number]        Account:      [id number]   :      1985           Service Date: 2020      Document: [id number]

## 2021-01-03 ENCOUNTER — HEALTH MAINTENANCE LETTER (OUTPATIENT)
Age: 36
End: 2021-01-03

## 2021-01-20 ENCOUNTER — OFFICE VISIT (OUTPATIENT)
Dept: FAMILY MEDICINE | Facility: CLINIC | Age: 36
End: 2021-01-20
Payer: COMMERCIAL

## 2021-01-20 VITALS
HEIGHT: 68 IN | DIASTOLIC BLOOD PRESSURE: 93 MMHG | TEMPERATURE: 96.8 F | OXYGEN SATURATION: 98 % | SYSTOLIC BLOOD PRESSURE: 144 MMHG | HEART RATE: 66 BPM | WEIGHT: 174 LBS | BODY MASS INDEX: 26.37 KG/M2

## 2021-01-20 DIAGNOSIS — Z82.49 FAMILY HISTORY OF ISCHEMIC HEART DISEASE: ICD-10-CM

## 2021-01-20 DIAGNOSIS — Z23 NEED FOR VACCINATION: ICD-10-CM

## 2021-01-20 DIAGNOSIS — E78.2 MIXED HYPERLIPIDEMIA: ICD-10-CM

## 2021-01-20 DIAGNOSIS — Z00.00 ROUTINE HISTORY AND PHYSICAL EXAMINATION OF ADULT: Primary | ICD-10-CM

## 2021-01-20 DIAGNOSIS — F41.9 ANXIETY: ICD-10-CM

## 2021-01-20 DIAGNOSIS — E78.5 HYPERLIPIDEMIA, UNSPECIFIED HYPERLIPIDEMIA TYPE: Chronic | ICD-10-CM

## 2021-01-20 DIAGNOSIS — I10 ESSENTIAL HYPERTENSION: ICD-10-CM

## 2021-01-20 DIAGNOSIS — L98.9 SKIN LESION: ICD-10-CM

## 2021-01-20 DIAGNOSIS — I25.2 HISTORY OF ST ELEVATION MYOCARDIAL INFARCTION (STEMI): ICD-10-CM

## 2021-01-20 PROBLEM — I21.11 STEMI INVOLVING RIGHT CORONARY ARTERY (H): Status: ACTIVE | Noted: 2018-04-24

## 2021-01-20 PROCEDURE — 99213 OFFICE O/P EST LOW 20 MIN: CPT | Mod: 25 | Performed by: INTERNAL MEDICINE

## 2021-01-20 PROCEDURE — 80053 COMPREHEN METABOLIC PANEL: CPT | Performed by: INTERNAL MEDICINE

## 2021-01-20 PROCEDURE — 99395 PREV VISIT EST AGE 18-39: CPT | Performed by: INTERNAL MEDICINE

## 2021-01-20 PROCEDURE — 80061 LIPID PANEL: CPT | Performed by: INTERNAL MEDICINE

## 2021-01-20 PROCEDURE — 36415 COLL VENOUS BLD VENIPUNCTURE: CPT | Performed by: INTERNAL MEDICINE

## 2021-01-20 ASSESSMENT — MIFFLIN-ST. JEOR: SCORE: 1689.27

## 2021-01-20 NOTE — PATIENT INSTRUCTIONS
Patient Education     Aerobic Exercise for a Healthy Heart  Exercise is a lot more than an energy booster and a stress reliever. It also strengthens your heart muscle, lowers your blood pressure and cholesterol, and burns calories. It can also improve your resting muscle tone, and your mood.     Choose an aerobic activity  Choose an activity that makes your heart and lungs work harder than they do when you rest or walk normally. This aerobic exercise can improve the way your heart and other muscles use oxygen. Make it fun by exercising with a friend and choosing an activity you enjoy. Here are some ideas:    Walking    Swimming    Bicycling    Stair climbing    Dancing    Jogging    Gardening  Exercise regularly  If you haven t been exercising regularly,  get your doctor s OK first. Then start slowly.  Here are some tips:    Begin exercising 3 times a week for 5 to 10 minutes at a time.    When you feel comfortable, add a few minutes each session.    Slowly build up to exercising 3 to 4 times each week. Each session should last for 40 minutes, on average, and involve moderate- to vigorous-intensity physical activity.    Your goal should be at least 30 minutes of moderate-intensity aerobic activity at least 5 days per week for a total of 150 or at least 25 minutes of vigorous aerobic activity at least 3 days per week for a total of 75 minutes    If you have been given nitroglycerin, be sure to carry it when you exercise.    If you get chest pain (angina) when you re exercising, stop what you re doing, take your nitroglycerin, and call your doctor.  [employer] last reviewed this educational content on 6/2/2016 2000-2020 [employer]. [address] [place] [address], [place] [address]. All rights reserved. This information is not intended as a substitute for professional medical care. Always follow your healthcare professional's instructions.

## 2021-01-20 NOTE — PROGRESS NOTES
SUBJECTIVE:   CC: Alonso Kan is an 35 year old male who presents for preventative health visit.       Patient has been advised of split billing requirements and indicates understanding: Yes  Healthy Habits:     Getting at least 3 servings of Calcium per day:  Yes    Bi-annual eye exam:  Yes    Dental care twice a year:  NO    Sleep apnea or symptoms of sleep apnea:  Excessive snoring    Diet:  Vegetarian/vegan    Frequency of exercise:  6-7 days/week    Duration of exercise:  Greater than 60 minutes    Taking medications regularly:  Yes    Barriers to taking medications:  None    Medication side effects:  Other    PHQ-2 Total Score: 0    Additional concerns today:  No      Today's PHQ-2 Score:   PHQ-2 (  [employer]) 2021   Q1: Little interest or pleasure in doing things 0   Q2: Feeling down, depressed or hopeless 0   PHQ-2 Score 0       Abuse: Current or Past(Physical, Sexual or Emotional)- No  Do you feel safe in your environment? Yes        Social History     Tobacco Use     Smoking status: Never Smoker     Smokeless tobacco: Never Used   Substance Use Topics     Alcohol use: No     If you drink alcohol do you typically have >3 drinks per day or >7 drinks per week? No    Alcohol Use 2021   Prescreen: >3 drinks/day or >7 drinks/week? No       Last PSA: No results found for: PSA    Reviewed and updated as needed this visit by clinical staff  Tobacco  Allergies  Meds              Reviewed and updated as needed this visit by Provider                  Former PCP: Alyssa  Specialists: Dr Carroll (cardio); sees ayurvedic practiontioner in [place]  Problem list and medications reviewed and updated. See below for additional notes.  Social: nonsmoker, no alcohol    Significant FHx: significant for premature CAD father/mother/brother-; has several siblings  Exercise/Diet: as per above    Flu: declines  Tdap: due    Patient self discontinued statin in the past so cardio switched from lipitor to crestor and  "now on 20mg rather than 40mg, has repeat labs per cardio in a couple of months for recheck.     He admits he self adjusted his BP meds-1/2 of prescriped acei and bb dosing. States he wants to try to approach naturally and reduce reliance on meds    Reports statin interferes with his hunger    Posterior Scalp lesions, sometimes better/sometimes worse; never resolves    Ear pain was intermittent, resolved now    Depression has improved since 2011 but upon inquiry about ongoing anxiety he admits he experiences this, mostly in relation to his health    Review of Systems   10 point ROS of systems including Constitutional, Eyes, Respiratory, Cardiovascular, Gastroenterology, Genitourinary, Integumentary, Muscularskeletal, Psychiatric were all negative except for pertinent positives noted in my HPI.          OBJECTIVE:   BP (!) 141/92 (BP Location: Right arm, Patient Position: Sitting)   Pulse 85   Temp 96.8  F (36  C) (Temporal)   Ht 1.72 m (5' 7.72\")   Wt 78.9 kg (174 lb)   SpO2 98%   BMI 26.68 kg/m      Physical Exam   GENERAL APPEARANCE: AAOx3, no distress. Well developed.    RESP: Lungs CTA bilaterally. No w/r/r. No distress     CV: RRR, S1/S2 present. No m/r/c.     ABDOMEN:  soft, nontender, no distention. No rebound or guarding.     EXT: No c/c/e in lower extremities b/l.     MSK: ROM and strength intact in four extremities. No gross deformities noted.     NEURO: AAOx3, Motor function intact w/ 5/5 strength bilaterally to UE and LE.  Speech is fluent and comprehension intact. No gait abnormalities noted.    PSYCH: appropriate affect, anxious    ASSESSMENT/PLAN:   Alonso was seen today for physical.    Diagnoses and all orders for this visit:    Routine history and physical examination of adult    History of ST elevation myocardial infarction (STEMI)  Hyperlipidemia, unspecified hyperlipidemia type  Family history of ischemic heart disease  Essential hypertension  Majority of visit spent counseling patient on " "purpose of medications/evidence based approach, and high risk nature of noncompliance with prescribed medications.  Recommend take all medications as prescribed (go back to taking full tablet of BB and ACEi as prescribed given elevated BP) and repeat lipid labs as already ordered for cardio as planned. Discussed goal LDL <70 given his risk factors and hx.     Patient states understanding    Anxiety  Depression mostly resolved. However exhibits symptoms of anxiety. Discussed options for treatment, he is agreeable to therapy at this point and will consider:  -     MENTAL HEALTH REFERRAL  - Adult; Outpatient Treatment; Individual/Couples/Family/Group Therapy/Health Psychology; [facility]: [facility] [phone]; We will contact you to schedule the appointment or please call with any questions    Skin lesion  -     DERMATOLOGY ADULT REFERRAL; Future    ADDENDUM: after patient left visit, requested labs sooner than planned with his cardiologist. Have ordered CMP/Lipids.     Estimated body mass index is 26.68 kg/m  as calculated from the following:    Height as of this encounter: 1.72 m (5' 7.72\").    Weight as of this encounter: 78.9 kg (174 lb).       He reports that he has never smoked. He has never used smokeless tobacco.      Counseling Resources:  ATP IV Guidelines  Pooled Cohorts Equation Calculator  FRAX Risk Assessment  ICSI Preventive Guidelines  Dietary Guidelines for Americans, 2010  USDA's MyPlate  ASA Prophylaxis  Lung CA Screening    DO [facility]  "

## 2021-01-21 LAB
ALBUMIN SERPL-MCNC: 4 G/DL (ref 3.4–5)
ALP SERPL-CCNC: 96 U/L (ref 40–150)
ALT SERPL W P-5'-P-CCNC: 48 U/L (ref 0–70)
ANION GAP SERPL CALCULATED.3IONS-SCNC: 4 MMOL/L (ref 3–14)
AST SERPL W P-5'-P-CCNC: 33 U/L (ref 0–45)
BILIRUB SERPL-MCNC: 0.4 MG/DL (ref 0.2–1.3)
BUN SERPL-MCNC: 6 MG/DL (ref 7–30)
CALCIUM SERPL-MCNC: 9.2 MG/DL (ref 8.5–10.1)
CHLORIDE SERPL-SCNC: 106 MMOL/L (ref 94–109)
CHOLEST SERPL-MCNC: 182 MG/DL
CO2 SERPL-SCNC: 27 MMOL/L (ref 20–32)
CREAT SERPL-MCNC: 0.72 MG/DL (ref 0.66–1.25)
GFR SERPL CREATININE-BSD FRML MDRD: >90 ML/MIN/{1.73_M2}
GLUCOSE SERPL-MCNC: 94 MG/DL (ref 70–99)
HDLC SERPL-MCNC: 41 MG/DL
LDLC SERPL CALC-MCNC: 118 MG/DL
NONHDLC SERPL-MCNC: 141 MG/DL
POTASSIUM SERPL-SCNC: 4.6 MMOL/L (ref 3.4–5.3)
PROT SERPL-MCNC: 8 G/DL (ref 6.8–8.8)
SODIUM SERPL-SCNC: 137 MMOL/L (ref 133–144)
TRIGL SERPL-MCNC: 114 MG/DL

## 2021-01-22 RX ORDER — ROSUVASTATIN CALCIUM 20 MG/1
20 TABLET, COATED ORAL AT BEDTIME
Qty: 170 TABLET | Refills: 1 | Status: SHIPPED | OUTPATIENT
Start: 2021-01-22 | End: 2021-03-15

## 2021-01-25 ENCOUNTER — TELEPHONE (OUTPATIENT)
Dept: CARDIOLOGY | Facility: CLINIC | Age: 36
End: 2021-01-25

## 2021-01-25 NOTE — TELEPHONE ENCOUNTER
Patient called wondering if a ear ache would affect a cardiac MRI? Patient has intermittent ear aches for a long time that comes and goes.     Patient also notes he has a Metal tooth peg implant and wonders if that is affected by a MRI ? .    Spoke with MRI Dept.  Metal peg dental  implants are not affected by MRI.      Spoke with Patient regarding his questions.    Patient to call and schedule MRI and also at time of test tell tech about metal tooth peg.    Patient verbalized understanding and agrees.

## 2021-02-08 ENCOUNTER — TELEPHONE (OUTPATIENT)
Dept: CARDIOLOGY | Facility: CLINIC | Age: 36
End: 2021-02-08

## 2021-02-08 NOTE — TELEPHONE ENCOUNTER
Call from patient regarding a sensation since last night of abdominal acidity. Patient states this is not heartburn, it is located only in his abdomen. He is not nauseated, vomiting and has no diarrhea. Patient states he is feeling better and is trying to eat a few bland foods.   Reviewed with patient that this is not a typical symptom of heart issues. Reviewed with patient to monitor his symptoms and call if they progress or he thinks he has cardiac symptoms.    Patient has been trying to reach scheduling to set up overdue MRI.  Offered to send a message to the team to give him a call.

## 2021-02-13 ENCOUNTER — TELEPHONE (OUTPATIENT)
Facility: CLINIC | Age: 36
End: 2021-02-13

## 2021-02-15 ENCOUNTER — HOSPITAL ENCOUNTER (EMERGENCY)
Facility: CLINIC | Age: 36
Discharge: HOME | End: 2021-02-15
Attending: EMERGENCY MEDICINE | Admitting: EMERGENCY MEDICINE
Payer: COMMERCIAL

## 2021-02-15 ENCOUNTER — TELEPHONE (OUTPATIENT)
Dept: CARDIOLOGY | Facility: CLINIC | Age: 36
End: 2021-02-15

## 2021-02-15 VITALS
OXYGEN SATURATION: 97 % | RESPIRATION RATE: 10 BRPM | TEMPERATURE: 97.4 F | SYSTOLIC BLOOD PRESSURE: 130 MMHG | HEART RATE: 70 BPM | DIASTOLIC BLOOD PRESSURE: 93 MMHG

## 2021-02-15 DIAGNOSIS — R10.13 EPIGASTRIC PAIN: ICD-10-CM

## 2021-02-15 LAB
ALBUMIN SERPL-MCNC: 3.9 G/DL (ref 3.4–5)
ALP SERPL-CCNC: 101 U/L (ref 40–150)
ALT SERPL W P-5'-P-CCNC: 47 U/L (ref 0–70)
ANION GAP SERPL CALCULATED.3IONS-SCNC: 6 MMOL/L (ref 3–14)
AST SERPL W P-5'-P-CCNC: 30 U/L (ref 0–45)
BASOPHILS # BLD AUTO: 0.1 10E9/L (ref 0–0.2)
BASOPHILS NFR BLD AUTO: 0.7 %
BILIRUB SERPL-MCNC: 0.4 MG/DL (ref 0.2–1.3)
BUN SERPL-MCNC: 7 MG/DL (ref 7–30)
CALCIUM SERPL-MCNC: 9.1 MG/DL (ref 8.5–10.1)
CHLORIDE SERPL-SCNC: 105 MMOL/L (ref 94–109)
CO2 SERPL-SCNC: 26 MMOL/L (ref 20–32)
CREAT SERPL-MCNC: 0.74 MG/DL (ref 0.66–1.25)
DIFFERENTIAL METHOD BLD: NORMAL
EOSINOPHIL # BLD AUTO: 0.2 10E9/L (ref 0–0.7)
EOSINOPHIL NFR BLD AUTO: 3 %
ERYTHROCYTE [DISTWIDTH] IN BLOOD BY AUTOMATED COUNT: 11.9 % (ref 10–15)
GFR SERPL CREATININE-BSD FRML MDRD: >90 ML/MIN/{1.73_M2}
GLUCOSE SERPL-MCNC: 136 MG/DL (ref 70–99)
HCT VFR BLD AUTO: 40.7 % (ref 40–53)
HGB BLD-MCNC: 14.7 G/DL (ref 13.3–17.7)
IMM GRANULOCYTES # BLD: 0 10E9/L (ref 0–0.4)
IMM GRANULOCYTES NFR BLD: 0.3 %
INTERPRETATION ECG - MUSE: NORMAL
LYMPHOCYTES # BLD AUTO: 2.5 10E9/L (ref 0.8–5.3)
LYMPHOCYTES NFR BLD AUTO: 35.2 %
MCH RBC QN AUTO: 33 PG (ref 26.5–33)
MCHC RBC AUTO-ENTMCNC: 36.1 G/DL (ref 31.5–36.5)
MCV RBC AUTO: 92 FL (ref 78–100)
MONOCYTES # BLD AUTO: 0.7 10E9/L (ref 0–1.3)
MONOCYTES NFR BLD AUTO: 10.4 %
NEUTROPHILS # BLD AUTO: 3.6 10E9/L (ref 1.6–8.3)
NEUTROPHILS NFR BLD AUTO: 50.4 %
NRBC # BLD AUTO: 0 10*3/UL
NRBC BLD AUTO-RTO: 0 /100
PLATELET # BLD AUTO: 354 10E9/L (ref 150–450)
POTASSIUM SERPL-SCNC: 3.7 MMOL/L (ref 3.4–5.3)
PROT SERPL-MCNC: 7.6 G/DL (ref 6.8–8.8)
RBC # BLD AUTO: 4.45 10E12/L (ref 4.4–5.9)
SODIUM SERPL-SCNC: 137 MMOL/L (ref 133–144)
TROPONIN I SERPL-MCNC: <0.015 UG/L (ref 0–0.04)
TROPONIN I SERPL-MCNC: <0.015 UG/L (ref 0–0.04)
WBC # BLD AUTO: 7.1 10E9/L (ref 4–11)

## 2021-02-15 PROCEDURE — 84484 ASSAY OF TROPONIN QUANT: CPT | Performed by: PHYSICIAN ASSISTANT

## 2021-02-15 PROCEDURE — 85025 COMPLETE CBC W/AUTO DIFF WBC: CPT | Performed by: PHYSICIAN ASSISTANT

## 2021-02-15 PROCEDURE — 250N000009 HC RX 250: Performed by: PHYSICIAN ASSISTANT

## 2021-02-15 PROCEDURE — 84484 ASSAY OF TROPONIN QUANT: CPT | Performed by: EMERGENCY MEDICINE

## 2021-02-15 PROCEDURE — 99284 EMERGENCY DEPT VISIT MOD MDM: CPT

## 2021-02-15 PROCEDURE — 250N000013 HC RX MED GY IP 250 OP 250 PS 637: Performed by: EMERGENCY MEDICINE

## 2021-02-15 PROCEDURE — 93005 ELECTROCARDIOGRAM TRACING: CPT

## 2021-02-15 PROCEDURE — 250N000013 HC RX MED GY IP 250 OP 250 PS 637: Performed by: PHYSICIAN ASSISTANT

## 2021-02-15 PROCEDURE — 80053 COMPREHEN METABOLIC PANEL: CPT | Performed by: PHYSICIAN ASSISTANT

## 2021-02-15 RX ORDER — PANTOPRAZOLE SODIUM 40 MG/1
40 TABLET, DELAYED RELEASE ORAL DAILY
Qty: 30 TABLET | Refills: 0 | Status: SHIPPED | OUTPATIENT
Start: 2021-02-15 | End: 2021-03-17

## 2021-02-15 RX ORDER — ASPIRIN 81 MG/1
324 TABLET, CHEWABLE ORAL ONCE
Status: COMPLETED | OUTPATIENT
Start: 2021-02-15 | End: 2021-02-15

## 2021-02-15 RX ADMIN — LIDOCAINE HYDROCHLORIDE 30 ML: 20 SOLUTION ORAL; TOPICAL at 16:09

## 2021-02-15 RX ADMIN — ASPIRIN 81 MG CHEWABLE TABLET 324 MG: 81 TABLET CHEWABLE at 17:03

## 2021-02-15 ASSESSMENT — ENCOUNTER SYMPTOMS
ABDOMINAL PAIN: 1
SHORTNESS OF BREATH: 0

## 2021-02-15 NOTE — ED PROVIDER NOTES
History     Chief Complaint:  Abdominal Pain       The history is provided by the patient.     Alonso Kan is a 35 year old male not currently anticoagulated with a history of STEMI, anxiety, hypertension, and CAD who presents for evaluation of upper abdominal pain. The patient reports that he has been what feels similar to GERD over the past few days. This pain is waxing and waning and has been resolved with honey water and some foods. His pain is also relieved when he does deep breathing during meditation. Given this pain, he spoke with his cardiologist today as he had a similar feeling of GERD prior to his STEMI three years ago who ultimately encouraged him to present. He does not currently have chest pain or this feeling of heartburn.      Here, he denies shortness of breath and notes that the antacid tablet relieved his symptoms. He has only previously had this upper abdominal pain prior to his MI and during this recent episode. He notes he has had increasing flatulence over the past few months. He takes aspirin daily at nighttime and does yoga to stay active.      Review of Systems   Respiratory: Negative for shortness of breath.    Cardiovascular: Negative for chest pain.   Gastrointestinal: Positive for abdominal pain (upper).   10 systems reviewed and negative except as above and in HPI.       Allergies:  No Known Drug Allergies      Medications:   Aspirin   Lisinopril  Metoprolol succinate  Nitroglycerin   Rosuvastatin     Medical History:   Hyperlipidemia   Situational depression   STEMI involving right coronary artery   Anxiety   Hypertension   CAD    Surgical History:  Cardiac catheterization     Family History:   Mother -  MI  Father -  MI    Social History:  The patient was unaccompanied to the ED.  He enjoys meditation and yoga.   PCP: Jayne Shah        Physical Exam     Patient Vitals for the past 24 hrs:   BP Temp Temp src Pulse Resp SpO2   02/15/21 1609 133/89 97.4  F (36.3  C) Temporal  100 17 99 %          Physical Exam    Head:  The scalp, face, and head appear normal  Mouth/Throat: Mucus membranes are moist  CV:  Regular rate    Normal S1 and S2  No pathological murmur   Resp:  Breath sounds clear and equal bilaterally    Non-labored, no retractions or accessory muscle use    No coarseness    No wheezing   GI:  Abdomen is soft, no rigidity    No tenderness to palpation  MS:  Normal motor assessment of all extremities.    Good capillary refill noted.    Skin:   No rash or lesions noted.  Neuro: Speech is normal and fluent. No apparent deficit.  Psych: Awake. Alert.  Normal affect.      Appropriate interactions.     Emergency Department Course     ECG:  ECG taken at 1619  Sinus rhythm with sinus arrhythmia  Inferior infarct (cited on or before 24-APR-2018)  Abnormal ECG  When compared with ECG of 25-APR-2018 08:41, Premature ventricular complexes are no longer present. Criteria for Anterior infarct are no longer present. T wave inversion no longer evident in Inferior leads. T wave inversion no longer evident in Anterolateral leads. QT has shortened.  Rate 90 bpm. SC interval 168 ms. QRS duration 106 ms. QT/QTc 354/433 ms. P-R-T axes 56 -2 37.     Laboratory:    CBC: WBC 7.1, HGB 14.7,   CMP: Glucose 136 (H), (Creatinine 0.74) o/w WNL   Troponin (Collected 1614): <0.015    Troponin (Collected 1904): <0.015      Emergency Department Course:    Reviewed:  I reviewed the patient's nursing notes, vitals, past medical records.     Assessments:  1647 I performed an exam of the patient, as documented above.   1950 Patient rechecked and updated following laboratory results.     Interventions:  1609 GI Cocktail 30 mL PO  1703 Aspirin 324 mg PO    Disposition:  The patient was discharged to home.     Impression & Plan     Medical Decision Making:    Alonso Kan is a 35 year old male with a cardiac history significant for STEMI and CAD who presents for evaluation of chest pain for the past three  days. The work up in the Emergency Department is negative for apparent life threatening pathology. The differential diagnosis of chest pain is broad and includes life threatening etiologies such as Acute coronary syndrome, Myocardial infarction, Pulmonary Embolism, Acute Aortic Dissection, amongst others.  Other causes may include pneumonia, pneumothorax, pericarditis, pleurisy, esophageal spasm. No serious etiology for the chest pain was detected today during this visit and his chest pain was somewhat relieved with the GI cocktail.  Serial troponins were negative and his labs and EKG show no concerning signs. His heart score is 3 which puts him at a low risk for adverse cardiac events and I feel he can be safely discharged to home. The patient should follow-up with his primary provider and return immediately to the emergency room with worsening symptoms. All questions were answered and the patient was discharged home.      Diagnosis:     ICD-10-CM    1. Epigastric pain  R10.13         Discharge Medications:  New Prescriptions    PANTOPRAZOLE (PROTONIX) 40 MG EC TABLET    Take 1 tablet (40 mg) by mouth daily for 30 doses       Scribe Disclosure:  I, Radha Paul, am serving as a scribe at 4:44 PM on 2/15/2021 to document services personally performed by Grace Vázquez MD based on my observations and the provider's statements to me.      Grace Vázquez MD  02/15/21 [id number]

## 2021-02-15 NOTE — TELEPHONE ENCOUNTER
Patient called stating he was thinking about going to the Dr. Now for indigestion that waxed and wanes in severity.  Patient states he had terrible indigestion over the weekend and spoke with the cardiologist on call.  Patient states he did have some indigestion when he had his MI in the past.    Patient spoke with PCP Clinic earlier today and was advised to go into ED.    Patient agrees to go into ED for evaluation. Denies chest discomfort, shortness of breath or dizziness, Concern=n is worsening indigestion.

## 2021-02-15 NOTE — ED TRIAGE NOTES
[facility]  ED Arrival Note    Arrives through triage. A &O X4. ABC's intact. Pt arrives with c/o heart burn. Reports it gets better with food. Comes in today because the pain has been getting worst over the past week.      Triage Interventions: EKG, IV and labs and See MAR for medications    Ambulatory: Yes    Directed to: Triage Tashi

## 2021-03-09 ENCOUNTER — TELEPHONE (OUTPATIENT)
Dept: CARDIOLOGY | Facility: CLINIC | Age: 36
End: 2021-03-09

## 2021-03-09 ENCOUNTER — HOSPITAL ENCOUNTER (OUTPATIENT)
Dept: CARDIOLOGY | Facility: CLINIC | Age: 36
Discharge: HOME | End: 2021-03-09
Attending: INTERNAL MEDICINE | Admitting: INTERNAL MEDICINE
Payer: COMMERCIAL

## 2021-03-09 DIAGNOSIS — I25.10 CORONARY ARTERY DISEASE INVOLVING NATIVE CORONARY ARTERY OF NATIVE HEART WITHOUT ANGINA PECTORIS: Primary | ICD-10-CM

## 2021-03-09 DIAGNOSIS — Z82.49 FAMILY HISTORY OF PREMATURE CORONARY ARTERY DISEASE: ICD-10-CM

## 2021-03-09 DIAGNOSIS — I25.2 HISTORY OF ST ELEVATION MYOCARDIAL INFARCTION: ICD-10-CM

## 2021-03-09 DIAGNOSIS — I25.10 CORONARY ARTERY DISEASE INVOLVING NATIVE CORONARY ARTERY OF NATIVE HEART WITHOUT ANGINA PECTORIS: ICD-10-CM

## 2021-03-09 DIAGNOSIS — I21.11 STEMI INVOLVING RIGHT CORONARY ARTERY (H): ICD-10-CM

## 2021-03-09 PROCEDURE — 75561 CARDIAC MRI FOR MORPH W/DYE: CPT

## 2021-03-09 PROCEDURE — 75561 CARDIAC MRI FOR MORPH W/DYE: CPT | Mod: 26 | Performed by: INTERNAL MEDICINE

## 2021-03-09 PROCEDURE — 255N000002 HC RX 255 OP 636: Performed by: INTERNAL MEDICINE

## 2021-03-09 PROCEDURE — A9585 GADOBUTROL INJECTION: HCPCS | Performed by: INTERNAL MEDICINE

## 2021-03-09 RX ORDER — GADOBUTROL 604.72 MG/ML
10 INJECTION INTRAVENOUS ONCE
Status: COMPLETED | OUTPATIENT
Start: 2021-03-09 | End: 2021-03-09

## 2021-03-09 RX ADMIN — GADOBUTROL 10 ML: 604.72 INJECTION INTRAVENOUS at 11:44

## 2021-03-09 NOTE — TELEPHONE ENCOUNTER
MRI 3/9/2021 noted. Ordered in November  to see if his myocardium has recovered from the inferior STEMI.     MRI: Normal biventricular size. Mildly reduced LV systolic function. Quantitative LVEF 48%. There is moderate inferior and inferseptal as well as basal inferolateral hypokinesis. Normal RV systolic function. Quantitative RVEF 55 %. Delayed hyperenhancement reveals subendocardial scar in the inferior, inferoseptal and basal inferolateral segments in the RCA distribution.    Patient has a history of CAD s/p STEMI, ASHVIN to RCA on 4/24/18  Echo 5/2019 showed EF 50-55%  Lipids and cmp are ordered for May but OV is not due until November 2021.    Will message Dr. Carroll to review

## 2021-03-10 NOTE — TELEPHONE ENCOUNTER
Mckay Carroll MD Anderson, Barbara E, RN; [facility] Heart Team 2 12 minutes ago (1:58 PM)       He can have an in clinic a virtual visit with an CATHY to discuss it.  Thanks.        Order placed for CATHY follow up. Called patient to discuss need for visit per Dr Carroll to review MRI, left message to call back.

## 2021-03-11 NOTE — TELEPHONE ENCOUNTER
Spoke with patient to review MRI a showing a decrease in the EF. Patient is scheduled to see CATHY Eloisa Soto on 3/15/2021.

## 2021-03-15 ENCOUNTER — VIRTUAL VISIT (OUTPATIENT)
Dept: CARDIOLOGY | Facility: CLINIC | Age: 36
End: 2021-03-15
Attending: INTERNAL MEDICINE
Payer: COMMERCIAL

## 2021-03-15 DIAGNOSIS — I10 ESSENTIAL HYPERTENSION: ICD-10-CM

## 2021-03-15 DIAGNOSIS — E78.2 MIXED HYPERLIPIDEMIA: ICD-10-CM

## 2021-03-15 DIAGNOSIS — I21.11 STEMI INVOLVING RIGHT CORONARY ARTERY (H): ICD-10-CM

## 2021-03-15 DIAGNOSIS — I25.5 ISCHEMIC CARDIOMYOPATHY: ICD-10-CM

## 2021-03-15 DIAGNOSIS — I25.10 CORONARY ARTERY DISEASE INVOLVING NATIVE CORONARY ARTERY OF NATIVE HEART WITHOUT ANGINA PECTORIS: Primary | ICD-10-CM

## 2021-03-15 PROCEDURE — 99215 OFFICE O/P EST HI 40 MIN: CPT | Mod: 95 | Performed by: NURSE PRACTITIONER

## 2021-03-15 RX ORDER — METOPROLOL SUCCINATE 25 MG/1
12.5 TABLET, EXTENDED RELEASE ORAL DAILY
Qty: 45 TABLET | Refills: 1 | Status: SHIPPED | OUTPATIENT
Start: 2021-03-15 | End: 2021-11-29

## 2021-03-15 RX ORDER — LISINOPRIL 2.5 MG/1
1.25 TABLET ORAL DAILY
Qty: 45 TABLET | Refills: 1 | Status: SHIPPED | OUTPATIENT
Start: 2021-03-15 | End: 2021-11-29

## 2021-03-15 RX ORDER — ROSUVASTATIN CALCIUM 20 MG/1
20 TABLET, COATED ORAL AT BEDTIME
Qty: 90 TABLET | Refills: 1 | Status: SHIPPED | OUTPATIENT
Start: 2021-03-15 | End: 2021-10-29

## 2021-03-15 NOTE — PROGRESS NOTES
"Review Of Systems  Skin: negative  Eyes: negative  Ears/Nose/Throat: negative  Respiratory: No shortness of breath, dyspnea on exertion, cough, or hemoptysis  Cardiovascular: negative  Gastrointestinal: negative  Genitourinary: negative  Musculoskeletal: negative  Neurologic: negative  Psychiatric: negative  Hematologic/Lymphatic/Immunologic: negative  Endocrine: negative    Vitals - Patient Reported  Systolic (Patient Reported): 131  Diastolic (Patient Reported): 72  Weight (Patient Reported): 76.2 kg (168 lb)  Height (Patient Reported): 171.5 cm (5' 7.5\")  BMI (Based on Pt Reported Ht/Wt): 25.92  Pulse (Patient Reported): 72    Reviewed:  NILDA Cherry  03/15/2021    Alonso is a 35 year old who is being evaluated via a billable video visit.      How would you like to obtain your AVS? MyChart  If the video visit is dropped, the invitation should be resent by: Text to cell phone: [phone]  Will anyone else be joining your video visit? No      Video Start Time: 10:34 AM  Video-Visit Details    Type of service:  Video Visit    Video End Time:10:56 AM    Originating Location (pt. Location): Home    Distant Location (provider location):  [facility]     Platform used for Video Visit: [facility]       Cardiology Clinic Progress Note  Alonso Kan MRN# [id number]   YOB: 1985 Age: 35 year old   Primary Cardiologist: Dr. 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     I had a video visit with Alonso today for cardiology follow up after his cardiac MRI. He is doing well from a cardiovascular standpoint with no cardiovascular symptoms. We reviewed his cardiac MRI result which showed mildly reduced LV systolic function, EF 48% (similar to prior echo " 50-55%), moderate inferior and inferseptal as well as basal inferolateral hypokinesis and delayed hyperenhancement reveals subendocardial scar in the inferior, inferoseptal and basal inferolateral segments in the RCA distribution.    We reviewed his most recent lipid panel from 2021 which showed his LDL was still above goal at 118. We extensively reviewed his cardiac medications today including rationale, dosing and consideration for titration. He wishes to continue to make lifestyle modifications. Plan to get repeat lipid panel in May, if LDL remains above goal he will be willing to consider titration of rosuvastatin. He doesn't wish to consider titration of lisinopril or metoprolol today.       1. Coronary artery disease - history of inferior STEMI in 2018 with 3.5 x 38 mm drug-eluting stent to his proximal RCA. He had minimal residual left sided coronary artery disease.   - Continue aspirin, metoprolol XL and rosuvastatin   - Counseled on lifestyle modifications    2. Dyslipidemia - lipid panel 2021 total cholesterol 182, HDL 41,  and triglycerides 114.    - Continue rosuvastatin 20mg daily    - Repeat lipid panel in May as planned   - Reinforced lifestyle modifications    3. Mild ischemic cardiomyopathy - EF 48% on cardiac MRI with subendocardial scar in the inferior, inferoseptal and basal inferolateral segments in the RCA distribution. Compensated and euvolemic.    - Reviewed medical therapy and rationale, reviewed consideration for titration his lisinopril/metoprolol, he decliend.    - Continue lisinopril and metoprolol XL    4. Family history of coronary artery disease -  Father  of a heart attack at age 53 years (nonsmoker, inconsistent medication use) and mother had a nonfatal myocardial infarction at age 48 years (treated with stents in [place]).         Changes today: none, patient was resistant to titration of medications    Follow up plan:     Recheck lipid panel in May, if LDL  remains above goal he would be willing to discuss further titration of statin therapy.     Cardiology follow up with CATHY in November as requested.         History of Presenting Illness:    Alonso Kan is a very pleasant 35 year old male with a history of premature coronary artery disease with previous inferior STEMI in 2018, dyslipidemia and family history of coronary artery disease.     Alonso follows with Dr. Carroll. He presented with an inferior STEMI in April 2018, symptoms of nausea/vomiting. He was found to have acute proximal occlusion of his right coronary artery which was successfully stented with 3.5 x 38 mm drug-eluting stent. He had minimal residual left sided coronary artery disease. EF was 55% at the time. He had stopped his statin therapy in the past and his LDL increased to 172, then then resumed in rosuvastatin at 20mg daily with improvement in LDL to 118 (still above goal of 70).     Most recent echocardiogram 5/2019.     Cardiac MRI 3/9/21 showed mildly reduced LV systolic function, EF 48% (similar to prior echo 50-55%), moderate inferior and inferseptal as well as basal inferolateral hypokinesis and delayed hyperenhancement reveals subendocardial scar in the inferior, inferoseptal and basal inferolateral segments in the RCA distribution.     Patient is here today for cardiology follow up.     Patient reports feeling good. Denies chest pain or chest tightness. Denies shortness of breath. Denies exertional dyspnea. Denies indigestion/burning sensation in abdomen.  Denies dizziness, lightheadedness or other presyncopal symptoms. Denies tachycardia or palpitations. Has been taking rosuvastatin 20mg daily, lisinopril 1.25mg daily, metoprolol XL 12.5mg. We extensively reviewed his cardiac medications today including rationale, dosing and consideration for titration. He wishes to continue to make lifestyle modifications. Plan to get repeat lipid panel in May, if LDL remains above goal he will be willing  to consider titration of rosuvastatin. He doesn't wish to consider titration of lisinopril or metoprolol today.     Labs from February show stable kidney function and electrolytes. Lipid panel in January showed total cholesterol 182, HDL 41,  and triglycerides 114. Blood pressure 123/77 and HR 72 in clinic today.    Appetite good. Working on improving his diet. Activity is sedentary, notes his activity is reduced with COVID. Has been trying to get activity in the house and hoping to get in the walking routine. Denies tobacco use. Denies alcohol use.         Recent Hospitalizations   4/24-4/27/2018 STEMI s/p ASHVIN to proximal RCA        Social History    . Works as a  for [employer].   Social History     Socioeconomic History     Marital status: Single     Spouse name: Not on file     Number of children: Not on file     Years of education: Not on file     Highest education level: Not on file   Occupational History     Occupation:      Employer: [employer]   Social Needs     Financial resource strain: Not on file     Food insecurity     Worry: Not on file     Inability: Not on file     Transportation needs     Medical: Not on file     Non-medical: Not on file   Tobacco Use     Smoking status: Never Smoker     Smokeless tobacco: Never Used   Substance and Sexual Activity     Alcohol use: No     Drug use: No     Sexual activity: Yes     Partners: Female   Lifestyle     Physical activity     Days per week: 5 days     Minutes per session: 60 min     Stress: To some extent   Relationships     Social connections     Talks on phone: Not on file     Gets together: Not on file     Attends Yarsanism service: Not on file     Active member of club or organization: Not on file     Attends meetings of clubs or organizations: Not on file     Relationship status: Not on file     Intimate partner violence     Fear of current or ex partner: Not on file     Emotionally abused: Not on file      Physically abused: Not on file     Forced sexual activity: Not on file   Other Topics Concern     Not on file   Social History Narrative     Not on file            Review of Systems:   Agree with above ROS         Physical Exam:   Vitals: There were no vitals taken for this visit.   Wt Readings from Last 4 Encounters:   01/20/21 78.9 kg (174 lb)   11/12/20 79.6 kg (175 lb 6.4 oz)   09/16/19 79.2 kg (174 lb 11.2 oz)   05/13/19 80.4 kg (177 lb 3.2 oz)     GEN: well nourished, in no acute distress.  HEENT:  Pupils equal, round. Sclerae nonicteric.   NECK: no masses appreciated.   RESP: Respirations are unlabored. No audible wheezing.   NEURO: Alert and oriented, cooperative.  SKIN: No visible rashes       Data:     Cardiac MRI 3/9/21  Normal biventricular size. Mildly reduced LV systolic function. Quantitative LVEF 48 %.  There is moderate inferior and inferseptal as well as basal inferolateral hypokinesis,  Normal RV systolic function. Quantitative RVEF 55 %.  Delayed hyperenhancement reveals subendocardial scar in the inferior, inferoseptal and basal inferolateral  segments in the RCA distribution.    Echo 5/13/19  The left ventricle is normal in size. Mildly decreased left ventricular  systolic function. EF 50-55%. There is mild hypokinesis of the basal and mid  segments of the inferior wall.  The right ventricular systolic function is normal.  Normal left atrial size. Right atrial size is normal.  No hemodynamically significant valvular abnormalities on 2D or color flow  imaging.  Compared to the study from 4/25/2018, the inferior wall motion abnormality is  less severe on todays echo. LV EF is unchanged.    LIPID RESULTS:  Lab Results   Component Value Date    CHOL 182 01/20/2021    HDL 41 01/20/2021     (H) 01/20/2021    TRIG 114 01/20/2021     LIVER ENZYME RESULTS:  Lab Results   Component Value Date    AST 30 02/15/2021    ALT 47 02/15/2021     CBC RESULTS:  Lab Results   Component Value Date    WBC 7.1  02/15/2021    RBC 4.45 02/15/2021    HGB 14.7 02/15/2021    HCT 40.7 02/15/2021    MCV 92 02/15/2021    MCH 33.0 02/15/2021    MCHC 36.1 02/15/2021    RDW 11.9 02/15/2021     02/15/2021     BMP RESULTS:  Lab Results   Component Value Date     02/15/2021    POTASSIUM 3.7 02/15/2021    CHLORIDE 105 02/15/2021    CO2 26 02/15/2021    ANIONGAP 6 02/15/2021     (H) 02/15/2021    BUN 7 02/15/2021    CR 0.74 02/15/2021    GFRESTIMATED >90 02/15/2021    GFRESTBLACK >90 02/15/2021    JANNIE 9.1 02/15/2021             Medications     Current Outpatient Medications   Medication Sig Dispense Refill     aspirin (ASA) 81 MG EC tablet Take 1 tablet (81 mg) by mouth daily 90 tablet 1     lisinopril (ZESTRIL) 2.5 MG tablet Take 0.5 tablets (1.25 mg) by mouth daily 45 tablet 1     metoprolol succinate ER (TOPROL-XL) 25 MG 24 hr tablet Take 0.5 tablets (12.5 mg) by mouth daily 45 tablet 1     pantoprazole (PROTONIX) 40 MG EC tablet Take 1 tablet (40 mg) by mouth daily for 30 doses (Patient taking differently: Take 40 mg by mouth daily Pt takes 1/2 tab daily) 30 tablet 0     rosuvastatin (CRESTOR) 20 MG tablet Take 1 tablet (20 mg) by mouth At Bedtime 90 tablet 1     nitroGLYcerin (NITROSTAT) 0.4 MG sublingual tablet For chest pain place 1 tablet under the tongue every 5 minutes for 3 doses. If symptoms persist 5 minutes after 1st dose call 911. 25 tablet 0          Past Medical History     Past Medical History:   Diagnosis Date     Family history of ischemic heart disease 05/03/2018     Hyperlipidemia      Situational depression     After death of family members several years ago.     STEMI involving right coronary artery (H) 04/24/2018    S/p ASHVIN/ Prox RCA, mild disease to LAD/Circ. Symptoms of nausea, abdominal discomfort.     Past Surgical History:   Procedure Laterality Date     CARDIAC CATHERIZATION  04/24/2018    2.5 x 38mm Synergy ASHVIN to proximal RCA     Family History   Problem Relation Age of Onset      Myocardial Infarction Mother 48        Treated with stents in [place].     Myocardial Infarction Father 53         of a heart attack at age 53 years in [place]. Inconsistent medical therapy. Non smoker.            Allergies   Patient has no known allergies.    40 minutes spent on the date of the encounter doing chart review, history and exam, documentation and further activities as noted above      FARHANA Ac [facility]  Pager: [phone]

## 2021-03-15 NOTE — LETTER
"3/15/2021    Jayne Shah MD  [address] [facility] [address]  [facility] [address]    RE: Alonso Kan       Dear Colleague,    I had the pleasure of seeing Alonso Kan in the [facility].    Review Of Systems  Skin: negative  Eyes: negative  Ears/Nose/Throat: negative  Respiratory: No shortness of breath, dyspnea on exertion, cough, or hemoptysis  Cardiovascular: negative  Gastrointestinal: negative  Genitourinary: negative  Musculoskeletal: negative  Neurologic: negative  Psychiatric: negative  Hematologic/Lymphatic/Immunologic: negative  Endocrine: negative    Vitals - Patient Reported  Systolic (Patient Reported): 131  Diastolic (Patient Reported): 72  Weight (Patient Reported): 76.2 kg (168 lb)  Height (Patient Reported): 171.5 cm (5' 7.5\")  BMI (Based on Pt Reported Ht/Wt): 25.92  Pulse (Patient Reported): 72    Reviewed:  NILDA Cherry  03/15/2021    Alonso is a 35 year old who is being evaluated via a billable video visit.      How would you like to obtain your AVS? MyChart  If the video visit is dropped, the invitation should be resent by: Text to cell phone: [phone]  Will anyone else be joining your video visit? No      Video Start Time: 10:34 AM  Video-Visit Details    Type of service:  Video Visit    Video End Time:10:56 AM    Originating Location (pt. Location): Home    Distant Location (provider location):  [facility]     Platform used for Video Visit: [facility]       Cardiology Clinic Progress Note  Alonso Kan MRN# [id number]   YOB: 1985 Age: 35 year old   Primary Cardiologist: Dr. 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     I had a video visit with Alonso today for " cardiology follow up after his cardiac MRI. He is doing well from a cardiovascular standpoint with no cardiovascular symptoms. We reviewed his cardiac MRI result which showed mildly reduced LV systolic function, EF 48% (similar to prior echo 50-55%), moderate inferior and inferseptal as well as basal inferolateral hypokinesis and delayed hyperenhancement reveals subendocardial scar in the inferior, inferoseptal and basal inferolateral segments in the RCA distribution.    We reviewed his most recent lipid panel from 2021 which showed his LDL was still above goal at 118. We extensively reviewed his cardiac medications today including rationale, dosing and consideration for titration. He wishes to continue to make lifestyle modifications. Plan to get repeat lipid panel in May, if LDL remains above goal he will be willing to consider titration of rosuvastatin. He doesn't wish to consider titration of lisinopril or metoprolol today.       1. Coronary artery disease - history of inferior STEMI in 2018 with 3.5 x 38 mm drug-eluting stent to his proximal RCA. He had minimal residual left sided coronary artery disease.   - Continue aspirin, metoprolol XL and rosuvastatin   - Counseled on lifestyle modifications    2. Dyslipidemia - lipid panel 2021 total cholesterol 182, HDL 41,  and triglycerides 114.    - Continue rosuvastatin 20mg daily    - Repeat lipid panel in May as planned   - Reinforced lifestyle modifications    3. Mild ischemic cardiomyopathy - EF 48% on cardiac MRI with subendocardial scar in the inferior, inferoseptal and basal inferolateral segments in the RCA distribution. Compensated and euvolemic.    - Reviewed medical therapy and rationale, reviewed consideration for titration his lisinopril/metoprolol, he decliend.    - Continue lisinopril and metoprolol XL    4. Family history of coronary artery disease -  Father  of a heart attack at age 53 years (nonsmoker, inconsistent medication  use) and mother had a nonfatal myocardial infarction at age 48 years (treated with stents in [place]).         Changes today: none, patient was resistant to titration of medications    Follow up plan:     Recheck lipid panel in May, if LDL remains above goal he would be willing to discuss further titration of statin therapy.     Cardiology follow up with CATHY in November as requested.         History of Presenting Illness:    Alonso Kan is a very pleasant 35 year old male with a history of premature coronary artery disease with previous inferior STEMI in 2018, dyslipidemia and family history of coronary artery disease.     Alonso follows with Dr. Carroll. He presented with an inferior STEMI in April 2018, symptoms of nausea/vomiting. He was found to have acute proximal occlusion of his right coronary artery which was successfully stented with 3.5 x 38 mm drug-eluting stent. He had minimal residual left sided coronary artery disease. EF was 55% at the time. He had stopped his statin therapy in the past and his LDL increased to 172, then then resumed in rosuvastatin at 20mg daily with improvement in LDL to 118 (still above goal of 70).     Most recent echocardiogram 5/2019.     Cardiac MRI 3/9/21 showed mildly reduced LV systolic function, EF 48% (similar to prior echo 50-55%), moderate inferior and inferseptal as well as basal inferolateral hypokinesis and delayed hyperenhancement reveals subendocardial scar in the inferior, inferoseptal and basal inferolateral segments in the RCA distribution.     Patient is here today for cardiology follow up.     Patient reports feeling good. Denies chest pain or chest tightness. Denies shortness of breath. Denies exertional dyspnea. Denies indigestion/burning sensation in abdomen.  Denies dizziness, lightheadedness or other presyncopal symptoms. Denies tachycardia or palpitations. Has been taking rosuvastatin 20mg daily, lisinopril 1.25mg daily, metoprolol XL 12.5mg. We extensively  reviewed his cardiac medications today including rationale, dosing and consideration for titration. He wishes to continue to make lifestyle modifications. Plan to get repeat lipid panel in May, if LDL remains above goal he will be willing to consider titration of rosuvastatin. He doesn't wish to consider titration of lisinopril or metoprolol today.     Labs from February show stable kidney function and electrolytes. Lipid panel in January showed total cholesterol 182, HDL 41,  and triglycerides 114. Blood pressure 123/77 and HR 72 in clinic today.    Appetite good. Working on improving his diet. Activity is sedentary, notes his activity is reduced with COVID. Has been trying to get activity in the house and hoping to get in the walking routine. Denies tobacco use. Denies alcohol use.         Recent Hospitalizations   4/24-4/27/2018 STEMI s/p ASHVIN to proximal RCA        Social History    . Works as a  for [employer].   Social History     Socioeconomic History     Marital status: Single     Spouse name: Not on file     Number of children: Not on file     Years of education: Not on file     Highest education level: Not on file   Occupational History     Occupation:      Employer: [employer]   Social Needs     Financial resource strain: Not on file     Food insecurity     Worry: Not on file     Inability: Not on file     Transportation needs     Medical: Not on file     Non-medical: Not on file   Tobacco Use     Smoking status: Never Smoker     Smokeless tobacco: Never Used   Substance and Sexual Activity     Alcohol use: No     Drug use: No     Sexual activity: Yes     Partners: Female   Lifestyle     Physical activity     Days per week: 5 days     Minutes per session: 60 min     Stress: To some extent   Relationships     Social connections     Talks on phone: Not on file     Gets together: Not on file     Attends Mandaeism service: Not on file     Active member of club or  organization: Not on file     Attends meetings of clubs or organizations: Not on file     Relationship status: Not on file     Intimate partner violence     Fear of current or ex partner: Not on file     Emotionally abused: Not on file     Physically abused: Not on file     Forced sexual activity: Not on file   Other Topics Concern     Not on file   Social History Narrative     Not on file            Review of Systems:   Agree with above ROS         Physical Exam:   Vitals: There were no vitals taken for this visit.   Wt Readings from Last 4 Encounters:   01/20/21 78.9 kg (174 lb)   11/12/20 79.6 kg (175 lb 6.4 oz)   09/16/19 79.2 kg (174 lb 11.2 oz)   05/13/19 80.4 kg (177 lb 3.2 oz)     GEN: well nourished, in no acute distress.  HEENT:  Pupils equal, round. Sclerae nonicteric.   NECK: no masses appreciated.   RESP: Respirations are unlabored. No audible wheezing.   NEURO: Alert and oriented, cooperative.  SKIN: No visible rashes       Data:     Cardiac MRI 3/9/21  Normal biventricular size. Mildly reduced LV systolic function. Quantitative LVEF 48 %.  There is moderate inferior and inferseptal as well as basal inferolateral hypokinesis,  Normal RV systolic function. Quantitative RVEF 55 %.  Delayed hyperenhancement reveals subendocardial scar in the inferior, inferoseptal and basal inferolateral  segments in the RCA distribution.    Echo 5/13/19  The left ventricle is normal in size. Mildly decreased left ventricular  systolic function. EF 50-55%. There is mild hypokinesis of the basal and mid  segments of the inferior wall.  The right ventricular systolic function is normal.  Normal left atrial size. Right atrial size is normal.  No hemodynamically significant valvular abnormalities on 2D or color flow  imaging.  Compared to the study from 4/25/2018, the inferior wall motion abnormality is  less severe on todays echo. LV EF is unchanged.    LIPID RESULTS:  Lab Results   Component Value Date    CHOL 182  01/20/2021    HDL 41 01/20/2021     (H) 01/20/2021    TRIG 114 01/20/2021     LIVER ENZYME RESULTS:  Lab Results   Component Value Date    AST 30 02/15/2021    ALT 47 02/15/2021     CBC RESULTS:  Lab Results   Component Value Date    WBC 7.1 02/15/2021    RBC 4.45 02/15/2021    HGB 14.7 02/15/2021    HCT 40.7 02/15/2021    MCV 92 02/15/2021    MCH 33.0 02/15/2021    MCHC 36.1 02/15/2021    RDW 11.9 02/15/2021     02/15/2021     BMP RESULTS:  Lab Results   Component Value Date     02/15/2021    POTASSIUM 3.7 02/15/2021    CHLORIDE 105 02/15/2021    CO2 26 02/15/2021    ANIONGAP 6 02/15/2021     (H) 02/15/2021    BUN 7 02/15/2021    CR 0.74 02/15/2021    GFRESTIMATED >90 02/15/2021    GFRESTBLACK >90 02/15/2021    JANNIE 9.1 02/15/2021             Medications     Current Outpatient Medications   Medication Sig Dispense Refill     aspirin (ASA) 81 MG EC tablet Take 1 tablet (81 mg) by mouth daily 90 tablet 1     lisinopril (ZESTRIL) 2.5 MG tablet Take 0.5 tablets (1.25 mg) by mouth daily 45 tablet 1     metoprolol succinate ER (TOPROL-XL) 25 MG 24 hr tablet Take 0.5 tablets (12.5 mg) by mouth daily 45 tablet 1     pantoprazole (PROTONIX) 40 MG EC tablet Take 1 tablet (40 mg) by mouth daily for 30 doses (Patient taking differently: Take 40 mg by mouth daily Pt takes 1/2 tab daily) 30 tablet 0     rosuvastatin (CRESTOR) 20 MG tablet Take 1 tablet (20 mg) by mouth At Bedtime 90 tablet 1     nitroGLYcerin (NITROSTAT) 0.4 MG sublingual tablet For chest pain place 1 tablet under the tongue every 5 minutes for 3 doses. If symptoms persist 5 minutes after 1st dose call 911. 25 tablet 0          Past Medical History     Past Medical History:   Diagnosis Date     Family history of ischemic heart disease 05/03/2018     Hyperlipidemia      Situational depression     After death of family members several years ago.     STEMI involving right coronary artery (H) 04/24/2018    S/p ASHVIN/ Prox RCA, mild disease to  LAD/Circ. Symptoms of nausea, abdominal discomfort.     Past Surgical History:   Procedure Laterality Date     CARDIAC CATHERIZATION  2018    2.5 x 38mm Synergy ASHVIN to proximal RCA     Family History   Problem Relation Age of Onset     Myocardial Infarction Mother 48        Treated with stents in [place].     Myocardial Infarction Father 53         of a heart attack at age 53 years in [place]. Inconsistent medical therapy. Non smoker.            Allergies   Patient has no known allergies.    40 minutes spent on the date of the encounter doing chart review, history and exam, documentation and further activities as noted above      [facility]  Pager: [phone]    cc:   Mckay Carroll MD  [address] [place] [phone]

## 2021-10-10 ENCOUNTER — HEALTH MAINTENANCE LETTER (OUTPATIENT)
Age: 36
End: 2021-10-10

## 2021-10-29 DIAGNOSIS — E78.2 MIXED HYPERLIPIDEMIA: ICD-10-CM

## 2021-10-29 RX ORDER — ROSUVASTATIN CALCIUM 20 MG/1
20 TABLET, COATED ORAL AT BEDTIME
Qty: 90 TABLET | Refills: 0 | Status: SHIPPED | OUTPATIENT
Start: 2021-10-29 | End: 2021-11-08

## 2021-11-05 ENCOUNTER — LAB (OUTPATIENT)
Dept: LAB | Facility: CLINIC | Age: 36
End: 2021-11-05
Payer: COMMERCIAL

## 2021-11-05 DIAGNOSIS — I25.10 CORONARY ARTERY DISEASE INVOLVING NATIVE CORONARY ARTERY OF NATIVE HEART WITHOUT ANGINA PECTORIS: ICD-10-CM

## 2021-11-05 DIAGNOSIS — E78.2 MIXED HYPERLIPIDEMIA: ICD-10-CM

## 2021-11-05 LAB
ALBUMIN SERPL-MCNC: 3.9 G/DL (ref 3.4–5)
ALP SERPL-CCNC: 103 U/L (ref 40–150)
ALT SERPL W P-5'-P-CCNC: 87 U/L (ref 0–70)
ANION GAP SERPL CALCULATED.3IONS-SCNC: 3 MMOL/L (ref 3–14)
AST SERPL W P-5'-P-CCNC: 41 U/L (ref 0–45)
BILIRUB SERPL-MCNC: 0.5 MG/DL (ref 0.2–1.3)
BUN SERPL-MCNC: 8 MG/DL (ref 7–30)
CALCIUM SERPL-MCNC: 8.8 MG/DL (ref 8.5–10.1)
CHLORIDE BLD-SCNC: 103 MMOL/L (ref 94–109)
CHOLEST SERPL-MCNC: 196 MG/DL
CO2 SERPL-SCNC: 29 MMOL/L (ref 20–32)
CREAT SERPL-MCNC: 0.74 MG/DL (ref 0.66–1.25)
FASTING STATUS PATIENT QL REPORTED: YES
GFR SERPL CREATININE-BSD FRML MDRD: >90 ML/MIN/1.73M2
GLUCOSE BLD-MCNC: 96 MG/DL (ref 70–99)
HDLC SERPL-MCNC: 44 MG/DL
LDLC SERPL CALC-MCNC: 113 MG/DL
NONHDLC SERPL-MCNC: 152 MG/DL
POTASSIUM BLD-SCNC: 3.6 MMOL/L (ref 3.4–5.3)
PROT SERPL-MCNC: 7.9 G/DL (ref 6.8–8.8)
SODIUM SERPL-SCNC: 135 MMOL/L (ref 133–144)
TRIGL SERPL-MCNC: 194 MG/DL

## 2021-11-05 PROCEDURE — 80061 LIPID PANEL: CPT | Performed by: INTERNAL MEDICINE

## 2021-11-05 PROCEDURE — 80053 COMPREHEN METABOLIC PANEL: CPT | Performed by: INTERNAL MEDICINE

## 2021-11-05 PROCEDURE — 36415 COLL VENOUS BLD VENIPUNCTURE: CPT | Performed by: INTERNAL MEDICINE

## 2021-11-08 ENCOUNTER — OFFICE VISIT (OUTPATIENT)
Dept: CARDIOLOGY | Facility: CLINIC | Age: 36
End: 2021-11-08
Payer: COMMERCIAL

## 2021-11-08 VITALS
HEART RATE: 86 BPM | SYSTOLIC BLOOD PRESSURE: 137 MMHG | BODY MASS INDEX: 26.46 KG/M2 | DIASTOLIC BLOOD PRESSURE: 91 MMHG | WEIGHT: 174.6 LBS | HEIGHT: 68 IN | OXYGEN SATURATION: 99 %

## 2021-11-08 DIAGNOSIS — E78.2 MIXED HYPERLIPIDEMIA: ICD-10-CM

## 2021-11-08 DIAGNOSIS — I25.10 CORONARY ARTERY DISEASE INVOLVING NATIVE CORONARY ARTERY OF NATIVE HEART WITHOUT ANGINA PECTORIS: Primary | ICD-10-CM

## 2021-11-08 DIAGNOSIS — I10 BENIGN ESSENTIAL HYPERTENSION: ICD-10-CM

## 2021-11-08 PROCEDURE — 99214 OFFICE O/P EST MOD 30 MIN: CPT | Performed by: NURSE PRACTITIONER

## 2021-11-08 RX ORDER — ROSUVASTATIN CALCIUM 20 MG/1
30 TABLET, COATED ORAL AT BEDTIME
Qty: 90 TABLET | Refills: 3 | Status: SHIPPED | OUTPATIENT
Start: 2021-11-08 | End: 2022-12-22

## 2021-11-08 ASSESSMENT — MIFFLIN-ST. JEOR: SCORE: 1687.04

## 2021-11-08 NOTE — PATIENT INSTRUCTIONS
Thanks for participating in a office visit with the [facility] clinic today.    Reviewed results of fasting lipid panel.   LDL not at goal.   Agreeable to increase Crestor to 30 mg daily  Follow up lipid panel, ALT and AST in 8 weeks   Encourage all other medications  Encourage exercise and heart healthy diet  Follow up with PCP regarding GI symptoms    Follow up in 1 year with CATHY     Please call my nurse at [phone] with any questions or concerns.    Scheduling phone number: [phone]  Reminder: Please bring in all current medications, over the counter supplements and vitamin bottles to your next appointment.

## 2021-11-08 NOTE — LETTER
11/8/2021    Jayne Shah MD  [address]  [facility] [address]    RE: Alonso Kan       Dear Colleague,    I had the pleasure of seeing Alonso Kan in the [facility].  Cardiology Clinic Progress Note  Alonso Kan MRN# [id number]   YOB: 1985 Age: 36 year old     Reason For Visit:  6-month follow-up   Primary Cardiologist:   Dr. Swain          History of Presenting Illness:      Alonso Kan is a pleasant 36 year old patient who carries a past medical history significant for inferior STEMI in 2018 status post drug-eluting stent to the proximal RCA, family history of premature coronary artery disease, ischemic cardiomyopathy, and hyperlipidemia. He returns to the office today for a 6-month follow-up.    He is feeling well on a cardiac standpoint, denies chest pain, shortness of breath, PND, orthopnea, presyncope, syncope, edema, heart racing, or palpitations.  His primary complaint is excessive gas and bloating.  He became a father 1 month ago and admits he is struggling with lack of sleep and exercise.    Cardiac MRI in March of this year showed a ejection fraction of 48%.  Delayed hyperenhancement revealed a subendocardial scar in the inferior, inferior septal, basal inferior lateral segment of the RCA.  LV scar size is 14%.  RV is normal in size and function.    Blood pressure is mildly elevated at 137/91, home pressures are consistently normal in the 120s over 80s.  Recent BMP was unremarkable.  Lipid panel showed a total cholesterol 196, HDL 44, , and triglycerides 194.   BMI 26.77    Follows a strict vegan, no gluten diet  Remains compliant with all medications.                   Assessment and Plan:       1.  Coronary artery disease -status post STEMI in 2018 followed by drug-eluting stent to the proximal RCA.  Cardiac MRI in March of this year showed a ejection fraction of 48%.  Delayed hyperenhancement revealed a  subendocardial scar in the inferior, inferior septal, basal inferior lateral segment of the RCA.  LV scar size is 14%.  RV is normal in size and function.  No ischemic symptoms.  Continue on current medical therapy.  Encourage exercise, weight loss, and heart healthy diet.    2. Ischemic cardiomyopathy -EF 48%.  No symptoms of heart failure.  Euvolemic upon exam.  Continue on metoprolol and lisinopril.  3. Hyperlipidemia -recent lipid panel showed a total cholesterol of 196, HDL 44, , and triglycerides 194.  LDL not at goal.  He was previously resistant to an up titration in statin.  He is agreeable to increase rosuvastatin to 30 mg daily with follow-up lipid panel, ALT, AST in 2 to 3 months.                   Thank you for allowing me to participate in this delightful patient's care. I have recommended he follow up in 1 year with CATHY or sooner if needed.       Mariposa Kelley, FARHANA CNP         Review of Systems:     Review of Systems:  Skin:  Negative     Eyes:  Negative for glasses;contacts  ENT:  Negative    Respiratory:  Negative for cough;shortness of breath;sleep apnea  Cardiovascular:  Negative for;palpitations;chest pain;fatigue;lightheadedness;dizziness;edema;exercise intolerance  (chest tenderness to touch sometimes)  Gastroenterology: Negative for heartburn;reflux  Genitourinary:  Negative prostate problem  Musculoskeletal:  Negative    Neurologic:  Negative    Psychiatric:  Negative    Heme/Lymph/Imm:  Negative    Endocrine:  Negative for diabetes;thyroid disorder              Physical Exam:     GEN:  In general, this is a well nourished male in no acute distress.  HEENT:  Pupils equal, round. Sclerae nonicteric. Clear oropharynx. Mucous membranes moist.  NECK: Supple, no masses appreciated. Trachea midline.  No JVD   C/V:  Regular rate and rhythm, no murmur, rub or gallop. No S3 or RV heave.   RESP: Respirations are unlabored. No use of accessory muscles. Clear to auscultation bilaterally without  wheezing, rales, or rhonchi.  GI: Abdomen soft, nontender, nondistended. No HSM appreciated.   EXTREM: No LE edema. No cyanosis or clubbing.  NEURO: Alert and oriented, cooperative. No obvious focal deficits.   PSYCH: Normal affect.  SKIN: Warm and dry. No rashes or petechiae appreciated.          Past Medical History:     Past Medical History:   Diagnosis Date     Family history of ischemic heart disease 05/03/2018     Hyperlipidemia      Situational depression     After death of family members several years ago.     STEMI involving right coronary artery (H) 04/24/2018    S/p ASHVIN/ Prox RCA, mild disease to LAD/Circ. Symptoms of nausea, abdominal discomfort.              Past Surgical History:     Past Surgical History:   Procedure Laterality Date     CARDIAC CATHERIZATION  04/24/2018    2.5 x 38mm Synergy ASHVIN to proximal RCA              Allergies:   Patient has no known allergies.       Data:   All laboratory data reviewed:    LAST CHOLESTEROL:  Lab Results   Component Value Date    CHOL 196 11/05/2021    CHOL 182 01/20/2021     Lab Results   Component Value Date    HDL 44 11/05/2021    HDL 41 01/20/2021     Lab Results   Component Value Date     11/05/2021     01/20/2021     Lab Results   Component Value Date    TRIG 194 11/05/2021    TRIG 114 01/20/2021     No results found for: CHOLHDLRATIO    LAST BMP:  Lab Results   Component Value Date     11/05/2021     02/15/2021      Lab Results   Component Value Date    POTASSIUM 3.6 11/05/2021    POTASSIUM 3.7 02/15/2021     Lab Results   Component Value Date    CHLORIDE 103 11/05/2021    CHLORIDE 105 02/15/2021     Lab Results   Component Value Date    JANNIE 8.8 11/05/2021    JANNIE 9.1 02/15/2021     Lab Results   Component Value Date    CO2 29 11/05/2021    CO2 26 02/15/2021     Lab Results   Component Value Date    BUN 8 11/05/2021    BUN 7 02/15/2021     Lab Results   Component Value Date    CR 0.74 11/05/2021    CR 0.74 02/15/2021     Lab  Results   Component Value Date    GLC 96 11/05/2021     02/15/2021       LAST CBC:  Lab Results   Component Value Date    WBC 7.1 02/15/2021     Lab Results   Component Value Date    RBC 4.45 02/15/2021     Lab Results   Component Value Date    HGB 14.7 02/15/2021     Lab Results   Component Value Date    HCT 40.7 02/15/2021     Lab Results   Component Value Date    MCV 92 02/15/2021     Lab Results   Component Value Date    MCH 33.0 02/15/2021     Lab Results   Component Value Date    MCHC 36.1 02/15/2021     Lab Results   Component Value Date    RDW 11.9 02/15/2021     Lab Results   Component Value Date     02/15/2021         [facility]      cc:   Mckay Carroll MD  [address] [place] [id number]

## 2021-11-08 NOTE — PROGRESS NOTES
Cardiology Clinic Progress Note  Alonso Kan MRN# [id number]   YOB: 1985 Age: 36 year old     Reason For Visit:  6-month follow-up   Primary Cardiologist:   Dr. Swain          History of Presenting Illness:      Alonso Kan is a pleasant 36 year old patient who carries a past medical history significant for inferior STEMI in 2018 status post drug-eluting stent to the proximal RCA, family history of premature coronary artery disease, ischemic cardiomyopathy, and hyperlipidemia. He returns to the office today for a 6-month follow-up.    He is feeling well on a cardiac standpoint, denies chest pain, shortness of breath, PND, orthopnea, presyncope, syncope, edema, heart racing, or palpitations.  His primary complaint is excessive gas and bloating.  He became a father 1 month ago and admits he is struggling with lack of sleep and exercise.    Cardiac MRI in March of this year showed a ejection fraction of 48%.  Delayed hyperenhancement revealed a subendocardial scar in the inferior, inferior septal, basal inferior lateral segment of the RCA.  LV scar size is 14%.  RV is normal in size and function.    Blood pressure is mildly elevated at 137/91, home pressures are consistently normal in the 120s over 80s.  Recent BMP was unremarkable.  Lipid panel showed a total cholesterol 196, HDL 44, , and triglycerides 194.   BMI 26.77    Follows a strict vegan, no gluten diet  Remains compliant with all medications.                   Assessment and Plan:       1.  Coronary artery disease -status post STEMI in 2018 followed by drug-eluting stent to the proximal RCA.  Cardiac MRI in March of this year showed a ejection fraction of 48%.  Delayed hyperenhancement revealed a subendocardial scar in the inferior, inferior septal, basal inferior lateral segment of the RCA.  LV scar size is 14%.  RV is normal in size and function.  No ischemic symptoms.  Continue on current medical therapy.  Encourage exercise,  weight loss, and heart healthy diet.    2. Ischemic cardiomyopathy -EF 48%.  No symptoms of heart failure.  Euvolemic upon exam.  Continue on metoprolol and lisinopril.  3. Hyperlipidemia -recent lipid panel showed a total cholesterol of 196, HDL 44, , and triglycerides 194.  LDL not at goal.  He was previously resistant to an up titration in statin.  He is agreeable to increase rosuvastatin to 30 mg daily with follow-up lipid panel, ALT, AST in 2 to 3 months.                   Thank you for allowing me to participate in this delightful patient's care. I have recommended he follow up in 1 year with [facility] or sooner if needed.       Mariposa Kelley, APRN CNP         Review of Systems:     Review of Systems:  Skin:  Negative     Eyes:  Negative for glasses;contacts  ENT:  Negative    Respiratory:  Negative for cough;shortness of breath;sleep apnea  Cardiovascular:  Negative for;palpitations;chest pain;fatigue;lightheadedness;dizziness;edema;exercise intolerance  (chest tenderness to touch sometimes)  Gastroenterology: Negative for heartburn;reflux  Genitourinary:  Negative prostate problem  Musculoskeletal:  Negative    Neurologic:  Negative    Psychiatric:  Negative    Heme/Lymph/Imm:  Negative    Endocrine:  Negative for diabetes;thyroid disorder              Physical Exam:     GEN:  In general, this is a well nourished male in no acute distress.  HEENT:  Pupils equal, round. Sclerae nonicteric. Clear oropharynx. Mucous membranes moist.  NECK: Supple, no masses appreciated. Trachea midline.  No JVD   C/V:  Regular rate and rhythm, no murmur, rub or gallop. No S3 or RV heave.   RESP: Respirations are unlabored. No use of accessory muscles. Clear to auscultation bilaterally without wheezing, rales, or rhonchi.  GI: Abdomen soft, nontender, nondistended. No HSM appreciated.   EXTREM: No LE edema. No cyanosis or clubbing.  NEURO: Alert and oriented, cooperative. No obvious focal deficits.   PSYCH: Normal  affect.  SKIN: Warm and dry. No rashes or petechiae appreciated.          Past Medical History:     Past Medical History:   Diagnosis Date     Family history of ischemic heart disease 05/03/2018     Hyperlipidemia      Situational depression     After death of family members several years ago.     STEMI involving right coronary artery (H) 04/24/2018    S/p ASHVIN/ Prox RCA, mild disease to LAD/Circ. Symptoms of nausea, abdominal discomfort.              Past Surgical History:     Past Surgical History:   Procedure Laterality Date     CARDIAC CATHERIZATION  04/24/2018    2.5 x 38mm Synergy ASHVIN to proximal RCA              Allergies:   Patient has no known allergies.       Data:   All laboratory data reviewed:    LAST CHOLESTEROL:  Lab Results   Component Value Date    CHOL 196 11/05/2021    CHOL 182 01/20/2021     Lab Results   Component Value Date    HDL 44 11/05/2021    HDL 41 01/20/2021     Lab Results   Component Value Date     11/05/2021     01/20/2021     Lab Results   Component Value Date    TRIG 194 11/05/2021    TRIG 114 01/20/2021     No results found for: CHOLHDLRATIO    LAST BMP:  Lab Results   Component Value Date     11/05/2021     02/15/2021      Lab Results   Component Value Date    POTASSIUM 3.6 11/05/2021    POTASSIUM 3.7 02/15/2021     Lab Results   Component Value Date    CHLORIDE 103 11/05/2021    CHLORIDE 105 02/15/2021     Lab Results   Component Value Date    JANNIE 8.8 11/05/2021    JANNIE 9.1 02/15/2021     Lab Results   Component Value Date    CO2 29 11/05/2021    CO2 26 02/15/2021     Lab Results   Component Value Date    BUN 8 11/05/2021    BUN 7 02/15/2021     Lab Results   Component Value Date    CR 0.74 11/05/2021    CR 0.74 02/15/2021     Lab Results   Component Value Date    GLC 96 11/05/2021     02/15/2021       LAST CBC:  Lab Results   Component Value Date    WBC 7.1 02/15/2021     Lab Results   Component Value Date    RBC 4.45 02/15/2021     Lab Results    Component Value Date    HGB 14.7 02/15/2021     Lab Results   Component Value Date    HCT 40.7 02/15/2021     Lab Results   Component Value Date    MCV 92 02/15/2021     Lab Results   Component Value Date    MCH 33.0 02/15/2021     Lab Results   Component Value Date    MCHC 36.1 02/15/2021     Lab Results   Component Value Date    RDW 11.9 02/15/2021     Lab Results   Component Value Date     02/15/2021

## 2021-11-29 DIAGNOSIS — I25.10 CORONARY ARTERY DISEASE INVOLVING NATIVE CORONARY ARTERY OF NATIVE HEART WITHOUT ANGINA PECTORIS: ICD-10-CM

## 2021-11-29 RX ORDER — METOPROLOL SUCCINATE 25 MG/1
12.5 TABLET, EXTENDED RELEASE ORAL DAILY
Qty: 45 TABLET | Refills: 3 | Status: SHIPPED | OUTPATIENT
Start: 2021-11-29 | End: 2022-12-22

## 2021-11-29 RX ORDER — LISINOPRIL 2.5 MG/1
1.25 TABLET ORAL DAILY
Qty: 45 TABLET | Refills: 3 | Status: SHIPPED | OUTPATIENT
Start: 2021-11-29 | End: 2022-12-22

## 2022-02-09 NOTE — TELEPHONE ENCOUNTER
Progress Note    Patient: Angy Mac MRN: [id number]  SSN: [id number]    YOB: 1957  Age: 71 y.o. Sex: male      Admit Date: 2/7/2022    LOS: 2 days     Subjective:     No acute events overnight    Objective:     Vitals:    02/09/22 0400 02/09/22 0500 02/09/22 0601 02/09/22 0700   BP: 125/74 123/74     Pulse: (!) 116 (!) 114 (!) 108    Resp: 21 21 20    Temp: 98 °F (36.7 °C)   98.2 °F (36.8 °C)   SpO2: 97% 97% 97%    Weight:       Height:            Intake and Output:  Current Shift: No intake/output data recorded. Last three shifts: 02/07 1901 - 02/09 0700  In: -   Out: 1300 [Urine:1300]    Physical Exam:   General:   Intubated. Eyes:  Conjunctivae/corneas clear. PERRL, EOMs intact. Fundi benign   Ears:  Normal TMs and external ear canals both ears. Nose: Nares normal. Septum midline. Mucosa normal. No drainage or sinus tenderness. Mouth/Throat: Lips, mucosa, and tongue normal. Teeth and gums normal.   Neck: Supple, symmetrical, trachea midline, no adenopathy, thyroid: no enlargment/tenderness/nodules, no carotid bruit and no JVD. Back:   Symmetric, no curvature. ROM normal. No CVA tenderness. Lungs:   Clear to auscultation bilaterally. Heart:  Regular rate and rhythm, S1, S2 normal, no murmur, click, rub or gallop. Abdomen:   Soft, non-tender. Bowel sounds normal. No masses,  No organomegaly. Extremities: Extremities normal, atraumatic, no cyanosis or edema. Pulses: 2+ and symmetric all extremities. Skin: Skin color, texture, turgor normal. No rashes or lesions   Lymph nodes: Cervical, supraclavicular, and axillary nodes normal.   Neurologic:  Intubated       Lab/Data Review: All lab results for the last 24 hours reviewed. Assessment:     Active Problems:    Respiratory failure ([address] (2/7/2022)    Patient is 19-year-old white male with:  1. Syncope  2. Non-STEMI  3. CAD status post PCI  4. Hyperlipidemia  6. Vocal cord dysfunction  7. History of PE  8. Telephone call:  Patient calls the on-call cardiologist with 4 days of heartburn per his report.  He states that he has had continuous heartburn for 4 days that gets worse when he eats a spicy meal.  He has not tried any over-the-counter therapies for this such as Tums or PPI.  The patient did have a prior ST elevation myocardial infarction 2 to 3 years ago.  The patient does not have chronic heartburn per his report.  He did recall some heartburn type symptoms prior to his previous STEMI.  His heartburn is not worse with activity.    It is difficult to know what to make of this over the telephone.  The patient should go to the emergency room and have evaluation.  If his evaluation is negative I would recommend that he attempt an acid reducing medication going forward.     We had some brief discussion in regards to this.   Warfarin was stopped due to GI bleed  9. COPD  7. Obesity  8. Edema  9. Hypertensive emergency   10. Bilateral renal lesions, largest about 1.7 cm on the right side posteriorly. Plan:     Rhythm was stable overnight. Currently intubated on propofol and Versed. Discussed with neurology this morning. Further neurological evaluation once he is extubated and off sedatives. Currently on amlodipine, aspirin, atorvastatin, Plavix, IV Lasix and metoprolol. Echocardiogram pending. Recheck troponin. Potassium was 5.0, creatinine 1.1. Sodium is trending down. No accurate ins and outs. Urine drug screen negative. Chest x-ray was stable.   Signed By: Vivian See MD     February 9, 2022

## 2022-03-26 ENCOUNTER — HEALTH MAINTENANCE LETTER (OUTPATIENT)
Age: 37
End: 2022-03-26

## 2022-04-25 ENCOUNTER — LAB (OUTPATIENT)
Dept: LAB | Facility: CLINIC | Age: 37
End: 2022-04-25
Payer: COMMERCIAL

## 2022-04-25 DIAGNOSIS — E78.2 MIXED HYPERLIPIDEMIA: ICD-10-CM

## 2022-04-25 LAB
ALT SERPL W P-5'-P-CCNC: 40 U/L (ref 0–70)
AST SERPL W P-5'-P-CCNC: 26 U/L (ref 0–45)
CHOLEST SERPL-MCNC: 149 MG/DL
FASTING STATUS PATIENT QL REPORTED: YES
HDLC SERPL-MCNC: 44 MG/DL
LDLC SERPL CALC-MCNC: 69 MG/DL
NONHDLC SERPL-MCNC: 105 MG/DL
TRIGL SERPL-MCNC: 181 MG/DL

## 2022-04-25 PROCEDURE — 36415 COLL VENOUS BLD VENIPUNCTURE: CPT | Performed by: NURSE PRACTITIONER

## 2022-04-25 PROCEDURE — 84460 ALANINE AMINO (ALT) (SGPT): CPT | Performed by: NURSE PRACTITIONER

## 2022-04-25 PROCEDURE — 84450 TRANSFERASE (AST) (SGOT): CPT | Performed by: NURSE PRACTITIONER

## 2022-04-25 PROCEDURE — 80061 LIPID PANEL: CPT | Performed by: NURSE PRACTITIONER

## 2022-09-17 ENCOUNTER — HEALTH MAINTENANCE LETTER (OUTPATIENT)
Age: 37
End: 2022-09-17

## 2022-12-17 DIAGNOSIS — I25.10 CORONARY ARTERY DISEASE INVOLVING NATIVE CORONARY ARTERY OF NATIVE HEART WITHOUT ANGINA PECTORIS: ICD-10-CM

## 2022-12-17 DIAGNOSIS — E78.2 MIXED HYPERLIPIDEMIA: ICD-10-CM

## 2022-12-17 RX ORDER — ROSUVASTATIN CALCIUM 20 MG/1
30 TABLET, COATED ORAL AT BEDTIME
Qty: 90 TABLET | Refills: 3 | Status: CANCELLED | OUTPATIENT
Start: 2022-12-17

## 2022-12-17 RX ORDER — LISINOPRIL 2.5 MG/1
1.25 TABLET ORAL DAILY
Qty: 45 TABLET | Refills: 3 | Status: CANCELLED | OUTPATIENT
Start: 2022-12-17

## 2022-12-17 RX ORDER — METOPROLOL SUCCINATE 25 MG/1
12.5 TABLET, EXTENDED RELEASE ORAL DAILY
Qty: 45 TABLET | Refills: 3 | Status: CANCELLED | OUTPATIENT
Start: 2022-12-17

## 2022-12-22 ENCOUNTER — TELEPHONE (OUTPATIENT)
Dept: CARDIOLOGY | Facility: CLINIC | Age: 37
End: 2022-12-22

## 2022-12-22 DIAGNOSIS — I25.10 CORONARY ARTERY DISEASE INVOLVING NATIVE CORONARY ARTERY OF NATIVE HEART WITHOUT ANGINA PECTORIS: ICD-10-CM

## 2022-12-22 DIAGNOSIS — E78.2 MIXED HYPERLIPIDEMIA: Primary | ICD-10-CM

## 2022-12-22 DIAGNOSIS — E78.2 MIXED HYPERLIPIDEMIA: ICD-10-CM

## 2022-12-22 RX ORDER — ROSUVASTATIN CALCIUM 20 MG/1
30 TABLET, COATED ORAL AT BEDTIME
Qty: 135 TABLET | Refills: 0 | Status: SHIPPED | OUTPATIENT
Start: 2022-12-22 | End: 2023-01-09

## 2022-12-22 RX ORDER — LISINOPRIL 2.5 MG/1
1.25 TABLET ORAL DAILY
Qty: 45 TABLET | Refills: 0 | Status: SHIPPED | OUTPATIENT
Start: 2022-12-22 | End: 2023-01-09

## 2022-12-22 RX ORDER — METOPROLOL SUCCINATE 25 MG/1
12.5 TABLET, EXTENDED RELEASE ORAL DAILY
Qty: 45 TABLET | Refills: 0 | Status: SHIPPED | OUTPATIENT
Start: 2022-12-22 | End: 2023-01-09

## 2022-12-22 NOTE — TELEPHONE ENCOUNTER
[facility] Cardiology Refill Guideline reviewed.  Medication meets criteria for refill.    Received refill request for:  Lisinopril, Metoprolol and rosuvastatin.   Last OV was: 11/08/21 with Mariposa Kelley   Labs/EKG:   -FLP/ALT 4/25/22  -CMP 11/5/21  F/U scheduled: Patient is overdue for follow-up . Call placed to patient, he will make an appointment for follow-up. Message sent to scheduling as well  New script sent to: ARETHA ROSE RN  12/22/22 at 3:10 PM       .

## 2022-12-23 DIAGNOSIS — I25.10 CORONARY ARTERY DISEASE INVOLVING NATIVE CORONARY ARTERY OF NATIVE HEART WITHOUT ANGINA PECTORIS: Primary | ICD-10-CM

## 2023-01-06 ENCOUNTER — LAB (OUTPATIENT)
Dept: LAB | Facility: CLINIC | Age: 38
End: 2023-01-06
Payer: COMMERCIAL

## 2023-01-06 DIAGNOSIS — I25.10 CORONARY ARTERY DISEASE INVOLVING NATIVE CORONARY ARTERY OF NATIVE HEART WITHOUT ANGINA PECTORIS: ICD-10-CM

## 2023-01-06 LAB
ANION GAP SERPL CALCULATED.3IONS-SCNC: 7 MMOL/L (ref 3–14)
BUN SERPL-MCNC: 9 MG/DL (ref 7–30)
CALCIUM SERPL-MCNC: 9.1 MG/DL (ref 8.5–10.1)
CHLORIDE BLD-SCNC: 101 MMOL/L (ref 94–109)
CHOLEST SERPL-MCNC: 130 MG/DL
CO2 SERPL-SCNC: 28 MMOL/L (ref 20–32)
CREAT SERPL-MCNC: 0.7 MG/DL (ref 0.66–1.25)
FASTING STATUS PATIENT QL REPORTED: YES
GFR SERPL CREATININE-BSD FRML MDRD: >90 ML/MIN/1.73M2
GLUCOSE BLD-MCNC: 98 MG/DL (ref 70–99)
HDLC SERPL-MCNC: 39 MG/DL
LDLC SERPL CALC-MCNC: 75 MG/DL
NONHDLC SERPL-MCNC: 91 MG/DL
POTASSIUM BLD-SCNC: 4.1 MMOL/L (ref 3.4–5.3)
SODIUM SERPL-SCNC: 136 MMOL/L (ref 133–144)
TRIGL SERPL-MCNC: 81 MG/DL

## 2023-01-06 PROCEDURE — 80048 BASIC METABOLIC PNL TOTAL CA: CPT | Performed by: INTERNAL MEDICINE

## 2023-01-06 PROCEDURE — 80061 LIPID PANEL: CPT | Performed by: INTERNAL MEDICINE

## 2023-01-06 PROCEDURE — 36415 COLL VENOUS BLD VENIPUNCTURE: CPT | Performed by: INTERNAL MEDICINE

## 2023-01-09 ENCOUNTER — OFFICE VISIT (OUTPATIENT)
Dept: CARDIOLOGY | Facility: CLINIC | Age: 38
End: 2023-01-09
Payer: COMMERCIAL

## 2023-01-09 VITALS
HEART RATE: 78 BPM | WEIGHT: 170 LBS | SYSTOLIC BLOOD PRESSURE: 135 MMHG | HEIGHT: 68 IN | BODY MASS INDEX: 25.76 KG/M2 | DIASTOLIC BLOOD PRESSURE: 80 MMHG | OXYGEN SATURATION: 98 %

## 2023-01-09 DIAGNOSIS — Z82.49 FAMILY HISTORY OF PREMATURE CORONARY ARTERY DISEASE: ICD-10-CM

## 2023-01-09 DIAGNOSIS — E78.2 MIXED HYPERLIPIDEMIA: ICD-10-CM

## 2023-01-09 DIAGNOSIS — I25.10 CORONARY ARTERY DISEASE INVOLVING NATIVE CORONARY ARTERY OF NATIVE HEART WITHOUT ANGINA PECTORIS: Primary | ICD-10-CM

## 2023-01-09 DIAGNOSIS — I10 ESSENTIAL HYPERTENSION: Chronic | ICD-10-CM

## 2023-01-09 DIAGNOSIS — I25.5 ISCHEMIC CARDIOMYOPATHY: ICD-10-CM

## 2023-01-09 DIAGNOSIS — I21.19 ST ELEVATION MYOCARDIAL INFARCTION (STEMI) OF INFERIOR WALL (H): ICD-10-CM

## 2023-01-09 PROCEDURE — 99214 OFFICE O/P EST MOD 30 MIN: CPT | Performed by: INTERNAL MEDICINE

## 2023-01-09 PROCEDURE — 93000 ELECTROCARDIOGRAM COMPLETE: CPT | Performed by: INTERNAL MEDICINE

## 2023-01-09 RX ORDER — ROSUVASTATIN CALCIUM 10 MG/1
30 TABLET, COATED ORAL DAILY
Qty: 300 TABLET | Refills: 5 | Status: SHIPPED | OUTPATIENT
Start: 2023-01-09 | End: 2023-06-14

## 2023-01-09 RX ORDER — METOPROLOL SUCCINATE 25 MG/1
12.5 TABLET, EXTENDED RELEASE ORAL DAILY
Qty: 45 TABLET | Refills: 5 | Status: SHIPPED | OUTPATIENT
Start: 2023-01-09 | End: 2024-02-05

## 2023-01-09 RX ORDER — ROSUVASTATIN CALCIUM 20 MG/1
10 TABLET, COATED ORAL AT BEDTIME
Qty: 135 TABLET | Refills: 5 | Status: SHIPPED | OUTPATIENT
Start: 2023-01-09 | End: 2023-01-09

## 2023-01-09 RX ORDER — CLOPIDOGREL BISULFATE 75 MG/1
75 TABLET ORAL DAILY
Qty: 100 TABLET | Refills: 4 | Status: SHIPPED | OUTPATIENT
Start: 2023-01-09 | End: 2024-02-06

## 2023-01-09 RX ORDER — LISINOPRIL 2.5 MG/1
2.5 TABLET ORAL DAILY
Qty: 100 TABLET | Refills: 5 | Status: SHIPPED | OUTPATIENT
Start: 2023-01-09 | End: 2024-02-05

## 2023-01-09 NOTE — PROGRESS NOTES
Service Date: 2023    PRIMARY CARE PROVIDER:  Jayne Shah MD    CARDIOLOGY CATHY:  Mariposa Kelley, APRN, CNP    REASON FOR VISIT:    1.  Followup of premature CAD, status post inferior STEMI and PCI in 2018.  2.  Dyslipidemia.  3.  Family history of premature fatal myocardial infarction.    HISTORY OF PRESENT ILLNESS:    It was my pleasure to follow up with Alonso who is a delightful 37-year-old man originally from [facility], employed as a  for [employer], lives with his wife and [id number] son.  Never tobacco user, vegetarian diet, BMI 25 kg/m2.    Alonso's cardiovascular history is significant for:    1.  CAD with inferior STEMI presenting as nausea, vomiting and food poisoning like symptoms in 2018.  He was found to have acute proximal occlusion of the right coronary artery that was stented with a 3.5 x 38 mm drug-eluting stent.  Minimal disease in the left-sided coronary arteries.  No angina since.  2.  Ischemic cardiomyopathy without heart failure symptoms.  LVEF 47% with inferior wall scar.  3.  Mixed dyslipidemia with low HDL and high LDL of 170 prior to statin therapy. He is currently on 20 mg of rosuvastatin.  LDL is 75, triglycerides 81.  4.  Hypertension, on low-dose lisinopril 1.25 mg daily and metoprolol XL 12.5 mg daily.  The patient on low doses of GDMT per personal preference.   5.  Family history of premature fatal myocardial infarction.  Mother had nonfatal MI at age 48 years (treated with stents in [facility]).  Father  at age 53 years (never smoker, inconsistent medication use).    Alonso plays tennis regularly, weight is stable.  No cardiovascular symptoms.  At the time of his MI, his symptoms were GERD-like nausea and vomiting, so there was a delayed presentation.  He has not had recurrence of those symptoms.  He does not smoke, does not use alcohol.  He eats a plant-based diet, but includes clarified fat or maria fernanda in cooking.      DIAGNOSTIC DATA:  I personally reviewed and  "independently interpreted images of coronary angiogram, cardiac MRI, ECG image, labs and discussed in detail with the patient.    Labs:  Total cholesterol 130, HDL 39, LDL 75, triglycerides 81, normal renal panel with a creatinine of 0.7, potassium 4.1, normal CBC.    ECG today shows sinus rhythm with old inferior infarct, normal cardiac intervals.    Cardiac MRI shows normal ventricular size.  Mildly decreased systolic function with LVEF of 48% with inferolateral wall hypokinesis and subendocardial scar.  Normal RVEF of 55%.  No valvular heart disease.    I personally reviewed his coronary angiogram from 2018.  Normal left main.  Mild disease in the mid LAD, mild disease in the circumflex, acute thrombotic occlusion of the proximal right coronary artery that was stented.  He has 30% residual disease in the distal right coronary artery.    PHYSICAL EXAMINATION:    Regular heart sounds.  No murmur, no carotid bruit.  Vital signs as documented.    DIAGNOSES/ASSESSMENT:.  1.  Premature coronary artery disease, status post inferior STEMI, treated with primary PCI to proximal right coronary artery in 2018.  He is angina-free, but has myocardial scar due to delayed presentation (atypical symptoms).  e is having GERD-like symptoms with aspirin, so I will switch him to clopidogrel.  2.  Mild ischemic cardiomyopathy.  LVEF 48% with RCA territory scar.  The patient has been advised to take optimal doses of lisinopril and metoprolol, but he does extensive research on his own and his goal is to take \"as few medications as possible.\" I talked to him about guideline-directed medical therapy for cardiomyopathy.  He says his blood pressure at home also fluctuates.  He should go up on the lisinopril to 2.5 mg daily.  He is unlikely to take higher doses.  3.  Benign essential hypertension.  Increase lisinopril to 2.5 mg (from 1.25 mg, which has been the patient's preference).  Continue metoprolol XL at 12.5 mg daily.  4.  Mixed " dyslipidemia with goal LDL of 50 or below.  Alonso has had multiple discussions with healthcare providers including myself.  Right after his STEMI, he felt that his analytical skills were decreased and attributed this to statins.  He is on 20 mg of rosuvastatin.  Per updated guidelines, he has premature CAD and has a very high risk of future disease.  His LDL should be 50 or below.  Recommend at least going up to 30 mg daily.    PLAN:    1.  Medications:    - Increase lisinopril from 1.25 to 2.5 mg daily.  This will help both hypertension and ischemic cardiomyopathy.  Ideally, he should be on higher doses, but after informed decision making this is the highest for now.  Patient is not on optimal doses of GDMT per personal preference.    - Continue metoprolol XL 12.5 mg daily.  - He is having GERD-like symptoms with aspirin.  Therefore, stop it and take clopidogrel 75 mg daily.  - Increase rosuvastatin to 30 mg daily.  - New prescriptions provided.    2.  Counseling:    - We had detailed counseling about aggressive secondary prevention to minimize his risk of triple-vessel coronary artery disease.  He already has mild disease in the LAD, distal right coronary artery and circumflex.  His father  at age 53 years from a fatal MI.  Alonso wants to concentrate on lifestyle modification and take as few drugs as possible at this time.  I encouraged him to do weight training twice a week.    3.  Encouraged the patient to set up care with a primary care provider to screen for diabetes, cancer, etc.    4.  Orders placed today:  -- Follow up with Mariposa Kelley in 1 year with pre-visit echocardiogram and fasting labs.    High complexity medical decision making.    Mckay Carroll MD        D: 2023   T: 2023   MT: jose armando    Name:     ALONSO MERAZ  MRN:      [id number]        Account:      [id number]   :      1985           Service Date: 2023       Document: [id number]

## 2023-01-09 NOTE — LETTER
"1/9/2023    Jayne Shah MD  [address]  [place] [address]    RE: Alonso Kan       Dear Colleague,     I had the pleasure of seeing Alonso Kan in the [facility].    Clinic visit note dictated. Dictation reference number - [id number]        Today's clinic visit entailed:  The following tests were independently interpreted by me as noted in my documentation: Cardiac MRI, coronary angiogram, ECG, labs.  Ordering of each unique test  Prescription drug management  The level of medical decision making during this visit was of high complexity.        Encounter Diagnoses   Name Primary?     Coronary artery disease involving native coronary artery of native heart without angina pectoris Yes     ST elevation myocardial infarction (STEMI) of inferior wall (H)      Mixed hyperlipidemia      Family history of premature coronary artery disease      Essential hypertension      Ischemic cardiomyopathy          Orders Placed This Encounter   Procedures     Lipid Profile     CBC with platelets     Follow-Up with Cardiology CATHY     EKG 12-lead complete w/read - Clinics (performed today)     Echocardiogram Complete         Vitals: /80   Pulse 78   Ht 1.727 m (5' 8\")   Wt 77.1 kg (170 lb)   SpO2 98%   BMI 25.85 kg/m    Wt Readings from Last 5 Encounters:   01/09/23 77.1 kg (170 lb)   11/08/21 79.2 kg (174 lb 9.6 oz)   01/20/21 78.9 kg (174 lb)   11/12/20 79.6 kg (175 lb 6.4 oz)   09/16/19 79.2 kg (174 lb 11.2 oz)         CURRENT MEDICATIONS:  Current Outpatient Medications   Medication Sig Dispense Refill     clopidogrel (PLAVIX) 75 MG tablet Take 1 tablet (75 mg) by mouth daily 100 tablet 4     lisinopril (ZESTRIL) 2.5 MG tablet Take 1 tablet (2.5 mg) by mouth daily 100 tablet 5     metoprolol succinate ER (TOPROL XL) 25 MG 24 hr tablet Take 0.5 tablets (12.5 mg) by mouth daily 45 tablet 5     nitroGLYcerin (NITROSTAT) 0.4 MG sublingual tablet For chest pain place 1 tablet under the tongue " every 5 minutes for 3 doses. If symptoms persist 5 minutes after 1st dose call 911. 25 tablet 0     rosuvastatin (CRESTOR) 10 MG tablet Take 3 tablets (30 mg) by mouth daily 300 tablet 5         ALLERGIES:  No Known Allergies    PAST MEDICAL HISTORY:    Past Medical History:   Diagnosis Date     Essential hypertension 2021     Family history of ischemic heart disease 2018     Hyperlipidemia      Situational depression     After death of family members several years ago.     STEMI involving right coronary artery (H) 2018    S/p ASHVIN/ Prox RCA, mild disease to LAD/Circ. Symptoms of nausea, abdominal discomfort.       PAST SURGICAL HISTORY:    Past Surgical History:   Procedure Laterality Date     CARDIAC CATHERIZATION  2018    2.5 x 38mm Synergy ASHVIN to proximal RCA       FAMILY HISTORY:    Family History   Problem Relation Age of Onset     Myocardial Infarction Mother 48        Treated with stents in [place].     Myocardial Infarction Father 53         of a heart attack at age 53 years in [place]. Inconsistent medical therapy. Non smoker.       SOCIAL HISTORY:    Social History     Socioeconomic History     Marital status: Single   Occupational History     Occupation:      Employer: [employer]   Tobacco Use     Smoking status: Never     Smokeless tobacco: Never   Substance and Sexual Activity     Alcohol use: No     Drug use: No     Sexual activity: Yes     Partners: Female                       Service Date: 2023    PRIMARY CARE PROVIDER:  Jayne Shah MD    CARDIOLOGY CATHY:  FARHANA Escobar, CNP    REASON FOR VISIT:    1.  Followup of premature CAD, status post inferior STEMI and PCI in 2018.  2.  Dyslipidemia.  3.  Family history of premature fatal myocardial infarction.    HISTORY OF PRESENT ILLNESS:    It was my pleasure to follow up with Alonso who is a delightful 37-year-old man originally from [facility], employed as a  for [employer], lives with his wife  and [id number] son.  Never tobacco user, vegetarian diet, BMI 25 kg/m2.    Alonso's cardiovascular history is significant for:    1.  CAD with inferior STEMI presenting as nausea, vomiting and food poisoning like symptoms in 2018.  He was found to have acute proximal occlusion of the right coronary artery that was stented with a 3.5 x 38 mm drug-eluting stent.  Minimal disease in the left-sided coronary arteries.  No angina since.  2.  Ischemic cardiomyopathy without heart failure symptoms.  LVEF 47% with inferior wall scar.  3.  Mixed dyslipidemia with low HDL and high LDL of 170 prior to statin therapy. He is currently on 20 mg of rosuvastatin.  LDL is 75, triglycerides 81.  4.  Hypertension, on low-dose lisinopril 1.25 mg daily and metoprolol XL 12.5 mg daily.  The patient on low doses of GDMT per personal preference.   5.  Family history of premature fatal myocardial infarction.  Mother had nonfatal MI at age 48 years (treated with stents in [facility]).  Father  at age 53 years (never smoker, inconsistent medication use).    Alonso plays tennis regularly, weight is stable.  No cardiovascular symptoms.  At the time of his MI, his symptoms were GERD-like nausea and vomiting, so there was a delayed presentation.  He has not had recurrence of those symptoms.  He does not smoke, does not use alcohol.  He eats a plant-based diet, but includes clarified fat or maria fernanda in cooking.      DIAGNOSTIC DATA:  I personally reviewed and independently interpreted images of coronary angiogram, cardiac MRI, ECG image, labs and discussed in detail with the patient.    Labs:  Total cholesterol 130, HDL 39, LDL 75, triglycerides 81, normal renal panel with a creatinine of 0.7, potassium 4.1, normal CBC.    ECG today shows sinus rhythm with old inferior infarct, normal cardiac intervals.    Cardiac MRI shows normal ventricular size.  Mildly decreased systolic function with LVEF of 48% with inferolateral wall hypokinesis and  "subendocardial scar.  Normal RVEF of 55%.  No valvular heart disease.    I personally reviewed his coronary angiogram from 2018.  Normal left main.  Mild disease in the mid LAD, mild disease in the circumflex, acute thrombotic occlusion of the proximal right coronary artery that was stented.  He has 30% residual disease in the distal right coronary artery.    PHYSICAL EXAMINATION:    Regular heart sounds.  No murmur, no carotid bruit.  Vital signs as documented.    DIAGNOSES/ASSESSMENT:.  1.  Premature coronary artery disease, status post inferior STEMI, treated with primary PCI to proximal right coronary artery in 2018.  He is angina-free, but has myocardial scar due to delayed presentation (atypical symptoms).  e is having GERD-like symptoms with aspirin, so I will switch him to clopidogrel.  2.  Mild ischemic cardiomyopathy.  LVEF 48% with RCA territory scar.  The patient has been advised to take optimal doses of lisinopril and metoprolol, but he does extensive research on his own and his goal is to take \"as few medications as possible.\" I talked to him about guideline-directed medical therapy for cardiomyopathy.  He says his blood pressure at home also fluctuates.  He should go up on the lisinopril to 2.5 mg daily.  He is unlikely to take higher doses.  3.  Benign essential hypertension.  Increase lisinopril to 2.5 mg (from 1.25 mg, which has been the patient's preference).  Continue metoprolol XL at 12.5 mg daily.  4.  Mixed dyslipidemia with goal LDL of 50 or below.  Alonso has had multiple discussions with healthcare providers including myself.  Right after his STEMI, he felt that his analytical skills were decreased and attributed this to statins.  He is on 20 mg of rosuvastatin.  Per updated guidelines, he has premature CAD and has a very high risk of future disease.  His LDL should be 50 or below.  Recommend at least going up to 30 mg daily.    PLAN:    1.  Medications:    - Increase lisinopril from 1.25 to " 2.5 mg daily.  This will help both hypertension and ischemic cardiomyopathy.  Ideally, he should be on higher doses, but after informed decision making this is the highest for now.  Patient is not on optimal doses of GDMT per personal preference.    - Continue metoprolol XL 12.5 mg daily.  - He is having GERD-like symptoms with aspirin.  Therefore, stop it and take clopidogrel 75 mg daily.  - Increase rosuvastatin to 30 mg daily.  - New prescriptions provided.    2.  Counseling:    - We had detailed counseling about aggressive secondary prevention to minimize his risk of triple-vessel coronary artery disease.  He already has mild disease in the LAD, distal right coronary artery and circumflex.  His father  at age 53 years from a fatal MI.  Alonso wants to concentrate on lifestyle modification and take as few drugs as possible at this time.  I encouraged him to do weight training twice a week.    3.  Encouraged the patient to set up care with a primary care provider to screen for diabetes, cancer, etc.    4.  Orders placed today:  -- Follow up with Mariposa Kelley in 1 year with pre-visit echocardiogram and fasting labs.    High complexity medical decision making.    Mckay Carroll MD        D: 2023   T: 2023   MT: jose armando    Name:     ALONSO MERAZ  MRN:      [id number]        Account:      [id number]   :      1985           Service Date: 2023       Document: [id number]      Thank you for allowing me to participate in the care of your patient.      Sincerely,     Mckay Carroll MD     [facility]  cc:   FARHANA Escobar CNP  [address] [place] [address]

## 2023-01-09 NOTE — PROGRESS NOTES
"  Clinic visit note dictated. Dictation reference number - [id number]        Today's clinic visit entailed:  The following tests were independently interpreted by me as noted in my documentation: Cardiac MRI, coronary angiogram, ECG, labs.  Ordering of each unique test  Prescription drug management  The level of medical decision making during this visit was of high complexity.        Encounter Diagnoses   Name Primary?     Coronary artery disease involving native coronary artery of native heart without angina pectoris Yes     ST elevation myocardial infarction (STEMI) of inferior wall (H)      Mixed hyperlipidemia      Family history of premature coronary artery disease      Essential hypertension      Ischemic cardiomyopathy          Orders Placed This Encounter   Procedures     Lipid Profile     CBC with platelets     Follow-Up with Cardiology CATHY     EKG 12-lead complete w/read - Clinics (performed today)     Echocardiogram Complete         Vitals: /80   Pulse 78   Ht 1.727 m (5' 8\")   Wt 77.1 kg (170 lb)   SpO2 98%   BMI 25.85 kg/m    Wt Readings from Last 5 Encounters:   01/09/23 77.1 kg (170 lb)   11/08/21 79.2 kg (174 lb 9.6 oz)   01/20/21 78.9 kg (174 lb)   11/12/20 79.6 kg (175 lb 6.4 oz)   09/16/19 79.2 kg (174 lb 11.2 oz)         CURRENT MEDICATIONS:  Current Outpatient Medications   Medication Sig Dispense Refill     clopidogrel (PLAVIX) 75 MG tablet Take 1 tablet (75 mg) by mouth daily 100 tablet 4     lisinopril (ZESTRIL) 2.5 MG tablet Take 1 tablet (2.5 mg) by mouth daily 100 tablet 5     metoprolol succinate ER (TOPROL XL) 25 MG 24 hr tablet Take 0.5 tablets (12.5 mg) by mouth daily 45 tablet 5     nitroGLYcerin (NITROSTAT) 0.4 MG sublingual tablet For chest pain place 1 tablet under the tongue every 5 minutes for 3 doses. If symptoms persist 5 minutes after 1st dose call 911. 25 tablet 0     rosuvastatin (CRESTOR) 10 MG tablet Take 3 tablets (30 mg) by mouth daily 300 tablet 5 "         ALLERGIES:  No Known Allergies    PAST MEDICAL HISTORY:    Past Medical History:   Diagnosis Date     Essential hypertension 2021     Family history of ischemic heart disease 2018     Hyperlipidemia      Situational depression     After death of family members several years ago.     STEMI involving right coronary artery (H) 2018    S/p ASHVIN/ Prox RCA, mild disease to LAD/Circ. Symptoms of nausea, abdominal discomfort.       PAST SURGICAL HISTORY:    Past Surgical History:   Procedure Laterality Date     CARDIAC CATHERIZATION  2018    2.5 x 38mm Synergy ASHVIN to proximal RCA       FAMILY HISTORY:    Family History   Problem Relation Age of Onset     Myocardial Infarction Mother 48        Treated with stents in [place].     Myocardial Infarction Father 53         of a heart attack at age 53 years in [place]. Inconsistent medical therapy. Non smoker.       SOCIAL HISTORY:    Social History     Socioeconomic History     Marital status: Single   Occupational History     Occupation:      Employer: [employer]   Tobacco Use     Smoking status: Never     Smokeless tobacco: Never   Substance and Sexual Activity     Alcohol use: No     Drug use: No     Sexual activity: Yes     Partners: Female

## 2023-05-06 ENCOUNTER — HEALTH MAINTENANCE LETTER (OUTPATIENT)
Age: 38
End: 2023-05-06

## 2023-06-14 ENCOUNTER — TELEPHONE (OUTPATIENT)
Dept: CARDIOLOGY | Facility: CLINIC | Age: 38
End: 2023-06-14
Payer: COMMERCIAL

## 2023-06-14 DIAGNOSIS — E78.2 MIXED HYPERLIPIDEMIA: ICD-10-CM

## 2023-06-14 RX ORDER — ROSUVASTATIN CALCIUM 10 MG/1
30 TABLET, COATED ORAL DAILY
Qty: 270 TABLET | Refills: 2 | Status: SHIPPED | OUTPATIENT
Start: 2023-06-14 | End: 2023-06-16

## 2023-06-14 NOTE — TELEPHONE ENCOUNTER
Pt called to request a refill of his rosuvastatin, says pharmacy does not have it and he is travelling out of the country tomorrow. Pt meets criteria for refill, new Rx sent.

## 2023-06-16 RX ORDER — ROSUVASTATIN CALCIUM 20 MG/1
30 TABLET, COATED ORAL DAILY
Qty: 135 TABLET | Refills: 3 | Status: SHIPPED | OUTPATIENT
Start: 2023-06-16 | End: 2024-09-24

## 2023-06-16 NOTE — TELEPHONE ENCOUNTER
Patient left a voicemail after clinic hours yesterday saying that pharmacy is still waiting on response from clinic for rosuvastatin script and he is leaving to go out of the country for 2mos that same night. No calls/faxes received since prescription was sent on 6/14.     Called pharmacy to clarify, says that they tried calling after hours yesterday but could not speak to someone. Pharmacist states that his insurance will not pay for the three 10mg tablets as it was written in January, had just been filliing the 20mg tablet (1.5tabs daily) written from December. It was over $400 out of pocket for a 2mo supply and patient declined to fill. New prescription sent for 20mg tablet to [facility] for when patient returns.     Left a message for patient with information from pharmacy and that a new Rx was sent and should be available when he returns. Discussed that while it is not ideal to not take the rosuvastatin for 2mos, it is not an emergency and he should resume it when he gets back. Mychart also sent.

## 2024-02-05 DIAGNOSIS — I25.10 CORONARY ARTERY DISEASE INVOLVING NATIVE CORONARY ARTERY OF NATIVE HEART WITHOUT ANGINA PECTORIS: ICD-10-CM

## 2024-02-05 RX ORDER — METOPROLOL SUCCINATE 25 MG/1
12.5 TABLET, EXTENDED RELEASE ORAL DAILY
Qty: 45 TABLET | Refills: 0 | Status: SHIPPED | OUTPATIENT
Start: 2024-02-05 | End: 2024-04-25

## 2024-02-05 RX ORDER — LISINOPRIL 2.5 MG/1
2.5 TABLET ORAL DAILY
Qty: 90 TABLET | Refills: 0 | Status: SHIPPED | OUTPATIENT
Start: 2024-02-05 | End: 2024-04-25

## 2024-02-06 DIAGNOSIS — I25.10 CORONARY ARTERY DISEASE INVOLVING NATIVE CORONARY ARTERY OF NATIVE HEART WITHOUT ANGINA PECTORIS: ICD-10-CM

## 2024-02-06 RX ORDER — CLOPIDOGREL BISULFATE 75 MG/1
75 TABLET ORAL DAILY
Qty: 90 TABLET | Refills: 0 | Status: SHIPPED | OUTPATIENT
Start: 2024-02-06 | End: 2024-04-25

## 2024-04-12 ENCOUNTER — TELEPHONE (OUTPATIENT)
Dept: CARDIOLOGY | Facility: CLINIC | Age: 39
End: 2024-04-12
Payer: COMMERCIAL

## 2024-04-12 DIAGNOSIS — I25.5 ISCHEMIC CARDIOMYOPATHY: ICD-10-CM

## 2024-04-12 DIAGNOSIS — I25.10 CORONARY ARTERY DISEASE INVOLVING NATIVE CORONARY ARTERY OF NATIVE HEART WITHOUT ANGINA PECTORIS: Primary | ICD-10-CM

## 2024-04-12 NOTE — TELEPHONE ENCOUNTER
Message from lab team that orders have .    Orders updated for cbc, cmp and lipids as well as an echo.

## 2024-04-18 ENCOUNTER — HOSPITAL ENCOUNTER (OUTPATIENT)
Dept: CARDIOLOGY | Facility: CLINIC | Age: 39
Discharge: HOME | End: 2024-04-18
Attending: INTERNAL MEDICINE | Admitting: INTERNAL MEDICINE
Payer: COMMERCIAL

## 2024-04-18 DIAGNOSIS — I25.5 ISCHEMIC CARDIOMYOPATHY: ICD-10-CM

## 2024-04-18 DIAGNOSIS — I25.10 CORONARY ARTERY DISEASE INVOLVING NATIVE CORONARY ARTERY OF NATIVE HEART WITHOUT ANGINA PECTORIS: ICD-10-CM

## 2024-04-18 LAB — LVEF ECHO: NORMAL

## 2024-04-18 PROCEDURE — 255N000002 HC RX 255 OP 636: Performed by: INTERNAL MEDICINE

## 2024-04-18 PROCEDURE — 93306 TTE W/DOPPLER COMPLETE: CPT | Mod: 26 | Performed by: INTERNAL MEDICINE

## 2024-04-18 PROCEDURE — 999N000208 ECHOCARDIOGRAM COMPLETE

## 2024-04-18 RX ADMIN — HUMAN ALBUMIN MICROSPHERES AND PERFLUTREN 9 ML: 10; .22 INJECTION, SOLUTION INTRAVENOUS at 14:07

## 2024-04-19 ENCOUNTER — LAB (OUTPATIENT)
Dept: LAB | Facility: CLINIC | Age: 39
End: 2024-04-19
Payer: COMMERCIAL

## 2024-04-19 DIAGNOSIS — I25.10 CORONARY ARTERY DISEASE INVOLVING NATIVE CORONARY ARTERY OF NATIVE HEART WITHOUT ANGINA PECTORIS: ICD-10-CM

## 2024-04-19 LAB
ALBUMIN SERPL BCG-MCNC: 4.3 G/DL (ref 3.5–5.2)
ALP SERPL-CCNC: 114 U/L (ref 40–150)
ALT SERPL W P-5'-P-CCNC: 24 U/L (ref 0–70)
ANION GAP SERPL CALCULATED.3IONS-SCNC: 12 MMOL/L (ref 7–15)
AST SERPL W P-5'-P-CCNC: 27 U/L (ref 0–45)
BASOPHILS # BLD AUTO: 0 10E3/UL (ref 0–0.2)
BASOPHILS NFR BLD AUTO: 1 %
BILIRUB SERPL-MCNC: 0.6 MG/DL
BUN SERPL-MCNC: 7.5 MG/DL (ref 6–20)
CALCIUM SERPL-MCNC: 9.5 MG/DL (ref 8.6–10)
CHLORIDE SERPL-SCNC: 98 MMOL/L (ref 98–107)
CREAT SERPL-MCNC: 0.82 MG/DL (ref 0.67–1.17)
DEPRECATED HCO3 PLAS-SCNC: 24 MMOL/L (ref 22–29)
EGFRCR SERPLBLD CKD-EPI 2021: >90 ML/MIN/1.73M2
EOSINOPHIL # BLD AUTO: 0.4 10E3/UL (ref 0–0.7)
EOSINOPHIL NFR BLD AUTO: 5 %
ERYTHROCYTE [DISTWIDTH] IN BLOOD BY AUTOMATED COUNT: 11.8 % (ref 10–15)
GLUCOSE SERPL-MCNC: 97 MG/DL (ref 70–99)
HCT VFR BLD AUTO: 40.8 % (ref 40–53)
HGB BLD-MCNC: 14.4 G/DL (ref 13.3–17.7)
IMM GRANULOCYTES # BLD: 0 10E3/UL
IMM GRANULOCYTES NFR BLD: 0 %
LYMPHOCYTES # BLD AUTO: 2.3 10E3/UL (ref 0.8–5.3)
LYMPHOCYTES NFR BLD AUTO: 33 %
MCH RBC QN AUTO: 32.4 PG (ref 26.5–33)
MCHC RBC AUTO-ENTMCNC: 35.3 G/DL (ref 31.5–36.5)
MCV RBC AUTO: 92 FL (ref 78–100)
MONOCYTES # BLD AUTO: 0.7 10E3/UL (ref 0–1.3)
MONOCYTES NFR BLD AUTO: 10 %
NEUTROPHILS # BLD AUTO: 3.4 10E3/UL (ref 1.6–8.3)
NEUTROPHILS NFR BLD AUTO: 51 %
NRBC # BLD AUTO: 0 10E3/UL
NRBC BLD AUTO-RTO: 0 /100
PLATELET # BLD AUTO: 319 10E3/UL (ref 150–450)
POTASSIUM SERPL-SCNC: 3.9 MMOL/L (ref 3.4–5.3)
PROT SERPL-MCNC: 7.2 G/DL (ref 6.4–8.3)
RBC # BLD AUTO: 4.44 10E6/UL (ref 4.4–5.9)
SODIUM SERPL-SCNC: 134 MMOL/L (ref 135–145)
WBC # BLD AUTO: 6.8 10E3/UL (ref 4–11)

## 2024-04-19 PROCEDURE — 85025 COMPLETE CBC W/AUTO DIFF WBC: CPT | Performed by: INTERNAL MEDICINE

## 2024-04-19 PROCEDURE — 80053 COMPREHEN METABOLIC PANEL: CPT | Performed by: INTERNAL MEDICINE

## 2024-04-19 PROCEDURE — 36415 COLL VENOUS BLD VENIPUNCTURE: CPT | Performed by: INTERNAL MEDICINE

## 2024-04-19 PROCEDURE — 80061 LIPID PANEL: CPT | Performed by: INTERNAL MEDICINE

## 2024-04-20 LAB
CHOLEST SERPL-MCNC: 144 MG/DL
FASTING STATUS PATIENT QL REPORTED: YES
HDLC SERPL-MCNC: 37 MG/DL
LDLC SERPL CALC-MCNC: 88 MG/DL
NONHDLC SERPL-MCNC: 107 MG/DL
TRIGL SERPL-MCNC: 97 MG/DL

## 2024-04-25 ENCOUNTER — OFFICE VISIT (OUTPATIENT)
Dept: CARDIOLOGY | Facility: CLINIC | Age: 39
End: 2024-04-25
Payer: COMMERCIAL

## 2024-04-25 VITALS
HEIGHT: 68 IN | DIASTOLIC BLOOD PRESSURE: 83 MMHG | OXYGEN SATURATION: 100 % | HEART RATE: 77 BPM | WEIGHT: 165 LBS | BODY MASS INDEX: 25.01 KG/M2 | SYSTOLIC BLOOD PRESSURE: 128 MMHG

## 2024-04-25 DIAGNOSIS — E78.2 MIXED HYPERLIPIDEMIA: ICD-10-CM

## 2024-04-25 DIAGNOSIS — I25.10 CORONARY ARTERY DISEASE INVOLVING NATIVE CORONARY ARTERY OF NATIVE HEART WITHOUT ANGINA PECTORIS: Primary | ICD-10-CM

## 2024-04-25 PROCEDURE — 93000 ELECTROCARDIOGRAM COMPLETE: CPT | Performed by: INTERNAL MEDICINE

## 2024-04-25 PROCEDURE — 99214 OFFICE O/P EST MOD 30 MIN: CPT | Performed by: INTERNAL MEDICINE

## 2024-04-25 PROCEDURE — G2211 COMPLEX E/M VISIT ADD ON: HCPCS | Performed by: INTERNAL MEDICINE

## 2024-04-25 RX ORDER — CLOPIDOGREL BISULFATE 75 MG/1
75 TABLET ORAL DAILY
Qty: 100 TABLET | Refills: 6 | Status: SHIPPED | OUTPATIENT
Start: 2024-04-25

## 2024-04-25 RX ORDER — ROSUVASTATIN CALCIUM 20 MG/1
30 TABLET, COATED ORAL DAILY
Qty: 150 TABLET | Refills: 6 | Status: CANCELLED | OUTPATIENT
Start: 2024-04-25

## 2024-04-25 RX ORDER — LISINOPRIL 2.5 MG/1
2.5 TABLET ORAL DAILY
Qty: 100 TABLET | Refills: 6 | Status: SHIPPED | OUTPATIENT
Start: 2024-04-25

## 2024-04-25 RX ORDER — METOPROLOL SUCCINATE 25 MG/1
12.5 TABLET, EXTENDED RELEASE ORAL DAILY
Qty: 50 TABLET | Refills: 6 | Status: CANCELLED | OUTPATIENT
Start: 2024-04-25

## 2024-04-25 NOTE — LETTER
2024    Jayne Shah MD  [address]  [place] [address]    RE: Alonso Kan       Dear Colleague,     I had the pleasure of seeing Alonso Kan in the [facility].    SERVICE DATE: 2024      PRIMARY CARE PROVIDER:  Jayne Shah  [address]  [place] [address]    REASON FOR VISIT:  Level of premature CAD and dyslipidemia.    HISTORY OF PRESENT ILLNESS:  Alonso Kan is a delightful 38 year old male, employed as a  for [employer].  His wife and 2-year-old son are currently in [place].    Alonso's history is significant for premature CAD manifesting as inferior STEMI with nausea, vomiting and food poisoning like symptoms and 2018, found to have acute Li occluded proximal right coronary artery that was successfully stented with minimal disease in other coronary arteries.  He has not had recurrence of angina.  He has ischemic cardiomyopathy without heart failure symptoms, with mildly reduced LVEF of 48% with inferior wall scar on MRI.  Risk factors are mixed dyslipidemia with historically low HDL and high LDL of 170 prior to statin therapy, hypertension, family history of premature myocardial infarction in his mother at age 48 years which was treated with stent, father  at the 53 years from CAD.    Cardiovascularly asymptomatic.  Plant-based diet.  Regular exercise, yoga.  Well-controlled at 1983, pulse 77.    Lipid panel shows LDL of 88, triglycerides 97, HDL 37 on rosuvastatin 30 mg daily.  Normal renal panel with a creatinine of 0.82, potassium 9, normal CBC, normal liver panel.    Recent echocardiogram shows mildly reduced LVEF of 50%, inferior wall hypokinesis, normal right ventricular systolic function, no significant valve disease.    DIAGNOSES/ASSESSMENT:  Premature coronary artery disease status post inferior STEMI and RCA stenting in 2018.  No angina since.  On long-term clopidogrel monotherapy.  Mild ischemic  "cardiomyopathy, asymptomatic, LVEF 48% with inferior wall scar on cardiac MRI.  Hyperlipidemia with LDL not at goal.  He gets muscle aches on higher doses of rosuvastatin, so start prior authorization for PCSK9 inhibitor.  Hypertension.  Well-controlled.  Continue lisinopril 2.5 mg daily.    PLAN:  Prior authorization for PCSK9 inhibitor.  Reviewed indication, mechanism of injection and safe side effect profile.  Continue low-dose lisinopril and rosuvastatin 30 mg daily.  Follow-up with me in 6 months with fasting lipid panel, CMP.      Mckay Carroll MD      Established patient.   30 minutes spent by me on the date of the encounter doing chart review, history and exam, documentation and further activities per the note.      The longitudinal plan of care for the condition(s) below were addressed during this visit. Due to the added complexity in care, I will continue to support Alonso in the subsequent management of this condition(s) and with the ongoing continuity of care of this condition(s).    Problem List Items Addressed This Visit as of 4/25/2024     Hyperlipidemia    Hyperlipidemia.                         Vitals: /83   Pulse 77   Ht 1.727 m (5' 8\")   Wt 74.8 kg (165 lb)   SpO2 100%   BMI 25.09 kg/m    Wt Readings from Last 5 Encounters:   04/25/24 74.8 kg (165 lb)   01/09/23 77.1 kg (170 lb)   11/08/21 79.2 kg (174 lb 9.6 oz)   01/20/21 78.9 kg (174 lb)   11/12/20 79.6 kg (175 lb 6.4 oz)         Orders Placed This Encounter   Procedures    Comprehensive metabolic panel    Hemoglobin A1c    Lipid Profile    TSH with free T4 reflex    Follow-Up with Cardiology    EKG 12-lead complete w/read - Clinics (performed today)           CURRENT MEDICATIONS:  Current Outpatient Medications   Medication Sig Dispense Refill    clopidogrel (PLAVIX) 75 MG tablet Take 1 tablet (75 mg) by mouth daily 100 tablet 6    lisinopril (ZESTRIL) 2.5 MG tablet Take 1 tablet (2.5 mg) by mouth daily 100 tablet 6    " nitroGLYcerin (NITROSTAT) 0.4 MG sublingual tablet For chest pain place 1 tablet under the tongue every 5 minutes for 3 doses. If symptoms persist 5 minutes after 1st dose call 911. 25 tablet 0    rosuvastatin (CRESTOR) 20 MG tablet Take 1.5 tablets (30 mg) by mouth daily 135 tablet 3         ALLERGIES:  No Known Allergies    PAST MEDICAL HISTORY:    Past Medical History:   Diagnosis Date    Essential hypertension 2021    Family history of ischemic heart disease 2018    Hyperlipidemia     Situational depression     After death of family members several years ago.    STEMI involving right coronary artery (H) 2018    S/p ASHVIN/ Prox RCA, mild disease to LAD/Circ. Symptoms of nausea, abdominal discomfort.       PAST SURGICAL HISTORY:    Past Surgical History:   Procedure Laterality Date    CARDIAC CATHERIZATION  2018    2.5 x 38mm Synergy ASHVIN to proximal RCA       FAMILY HISTORY:    Family History   Problem Relation Age of Onset    Myocardial Infarction Mother 48        Treated with stents in [place].    Myocardial Infarction Father 53         of a heart attack at age 53 years in [place]. Inconsistent medical therapy. Non smoker.       Thank you for allowing me to participate in the care of your patient.      Sincerely,     Mckay Carroll MD     [facility]  cc:   No referring provider defined for this encounter.

## 2024-04-25 NOTE — PROGRESS NOTES
SERVICE DATE: 2024      PRIMARY CARE PROVIDER:  Jayne Shah  [address]  [facility], [place] [address]    REASON FOR VISIT:  Level of premature CAD and dyslipidemia.    HISTORY OF PRESENT ILLNESS:  Alonso Kan is a delightful 38 year old male, employed as a  for [employer].  His wife and 2-year-old son are currently in [place].    Alonso's history is significant for premature CAD manifesting as inferior STEMI with nausea, vomiting and food poisoning like symptoms and 2018, found to have acute Li occluded proximal right coronary artery that was successfully stented with minimal disease in other coronary arteries.  He has not had recurrence of angina.  He has ischemic cardiomyopathy without heart failure symptoms, with mildly reduced LVEF of 48% with inferior wall scar on MRI.  Risk factors are mixed dyslipidemia with historically low HDL and high LDL of 170 prior to statin therapy, hypertension, family history of premature myocardial infarction in his mother at age 48 years which was treated with stent, father  at the 53 years from CAD.    Cardiovascularly asymptomatic.  Plant-based diet.  Regular exercise, yoga.  Well-controlled at 1983, pulse 77.    Lipid panel shows LDL of 88, triglycerides 97, HDL 37 on rosuvastatin 30 mg daily.  Normal renal panel with a creatinine of 0.82, potassium 9, normal CBC, normal liver panel.    Recent echocardiogram shows mildly reduced LVEF of 50%, inferior wall hypokinesis, normal right ventricular systolic function, no significant valve disease.    DIAGNOSES/ASSESSMENT:  Premature coronary artery disease status post inferior STEMI and RCA stenting in 2018.  No angina since.  On long-term clopidogrel monotherapy.  Mild ischemic cardiomyopathy, asymptomatic, LVEF 48% with inferior wall scar on cardiac MRI.  Hyperlipidemia with LDL not at goal.  He gets muscle aches on higher doses of rosuvastatin, so start prior authorization for PCSK9  "inhibitor.  Hypertension.  Well-controlled.  Continue lisinopril 2.5 mg daily.    PLAN:  Prior authorization for PCSK9 inhibitor.  Reviewed indication, mechanism of injection and safe side effect profile.  Continue low-dose lisinopril and rosuvastatin 30 mg daily.  Follow-up with me in 6 months with fasting lipid panel, CMP.      Mckay Carroll MD      Established patient.   30 minutes spent by me on the date of the encounter doing chart review, history and exam, documentation and further activities per the note.      The longitudinal plan of care for the condition(s) below were addressed during this visit. Due to the added complexity in care, I will continue to support Alonso in the subsequent management of this condition(s) and with the ongoing continuity of care of this condition(s).    Problem List Items Addressed This Visit as of 4/25/2024     Hyperlipidemia    Hyperlipidemia.                         Vitals: /83   Pulse 77   Ht 1.727 m (5' 8\")   Wt 74.8 kg (165 lb)   SpO2 100%   BMI 25.09 kg/m    Wt Readings from Last 5 Encounters:   04/25/24 74.8 kg (165 lb)   01/09/23 77.1 kg (170 lb)   11/08/21 79.2 kg (174 lb 9.6 oz)   01/20/21 78.9 kg (174 lb)   11/12/20 79.6 kg (175 lb 6.4 oz)         Orders Placed This Encounter   Procedures    Comprehensive metabolic panel    Hemoglobin A1c    Lipid Profile    TSH with free T4 reflex    Follow-Up with Cardiology    EKG 12-lead complete w/read - Clinics (performed today)           CURRENT MEDICATIONS:  Current Outpatient Medications   Medication Sig Dispense Refill    clopidogrel (PLAVIX) 75 MG tablet Take 1 tablet (75 mg) by mouth daily 100 tablet 6    lisinopril (ZESTRIL) 2.5 MG tablet Take 1 tablet (2.5 mg) by mouth daily 100 tablet 6    nitroGLYcerin (NITROSTAT) 0.4 MG sublingual tablet For chest pain place 1 tablet under the tongue every 5 minutes for 3 doses. If symptoms persist 5 minutes after 1st dose call 911. 25 tablet 0    rosuvastatin " (CRESTOR) 20 MG tablet Take 1.5 tablets (30 mg) by mouth daily 135 tablet 3         ALLERGIES:  No Known Allergies    PAST MEDICAL HISTORY:    Past Medical History:   Diagnosis Date    Essential hypertension 2021    Family history of ischemic heart disease 2018    Hyperlipidemia     Situational depression     After death of family members several years ago.    STEMI involving right coronary artery (H) 2018    S/p ASHVIN/ Prox RCA, mild disease to LAD/Circ. Symptoms of nausea, abdominal discomfort.       PAST SURGICAL HISTORY:    Past Surgical History:   Procedure Laterality Date    CARDIAC CATHERIZATION  2018    2.5 x 38mm Synergy ASHVIN to proximal RCA       FAMILY HISTORY:    Family History   Problem Relation Age of Onset    Myocardial Infarction Mother 48        Treated with stents in [place].    Myocardial Infarction Father 53         of a heart attack at age 53 years in [place]. Inconsistent medical therapy. Non smoker.

## 2024-05-24 ENCOUNTER — TELEPHONE (OUTPATIENT)
Dept: CARDIOLOGY | Facility: CLINIC | Age: 39
End: 2024-05-24
Payer: COMMERCIAL

## 2024-05-24 DIAGNOSIS — E78.2 MIXED HYPERLIPIDEMIA: Primary | ICD-10-CM

## 2024-05-24 DIAGNOSIS — I25.10 CORONARY ARTERY DISEASE INVOLVING NATIVE CORONARY ARTERY OF NATIVE HEART WITHOUT ANGINA PECTORIS: ICD-10-CM

## 2024-05-24 RX ORDER — EVOLOCUMAB 140 MG/ML
140 INJECTION, SOLUTION SUBCUTANEOUS
COMMUNITY
Start: 2024-05-24 | End: 2024-06-05

## 2024-05-24 NOTE — TELEPHONE ENCOUNTER
Message from Dr. Carroll:  Mckay Carroll MD  [facility] Team 2  Hello please start prior authorization for PCSK9 inhibitor.  Details in my note.    Thank you.  SJ      Script placed for repatha sureclick  Message sent to PA team to request approval

## 2024-06-05 RX ORDER — EVOLOCUMAB 140 MG/ML
140 INJECTION, SOLUTION SUBCUTANEOUS
Qty: 6 ML | Refills: 4 | Status: SHIPPED | OUTPATIENT
Start: 2024-06-05

## 2024-06-05 NOTE — TELEPHONE ENCOUNTER
Prior Authorization Approval    Medication: REPATHA SURECLICK 140 MG/ML SC SOAJ  Authorization Effective Date: 6/5/2024  Authorization Expiration Date: 6/5/2025  Approved Dose/Quantity: 2 per 28 days  Reference #: [id number]   Insurance Company: [employer]'s - Phone [phone] Fax [phone]  Expected CoPay: $    CoPay Card Available:      Financial Assistance Needed: Unknown  Which Pharmacy is filling the prescription:    Pharmacy Notified: No, no pharmacy indicated on Rx.  Patient Notified: No, no pharmacy indicated on Rx.

## 2024-06-05 NOTE — TELEPHONE ENCOUNTER
Spoke to patient, reviewed PA approval for repatha. Rx sent to [facility]. Web address provided to watch demo video. He denies any allergy to latex. Side effects discussed. Reviewed med is to be stored in fridge. Patient will follow up in 6mo w/ Dr Carroll with repeat labs prior. Patient to call in the interim with any questions or concerns.

## 2024-07-13 ENCOUNTER — HEALTH MAINTENANCE LETTER (OUTPATIENT)
Age: 39
End: 2024-07-13

## 2024-09-24 DIAGNOSIS — E78.2 MIXED HYPERLIPIDEMIA: ICD-10-CM

## 2024-09-24 RX ORDER — ROSUVASTATIN CALCIUM 20 MG/1
30 TABLET, COATED ORAL DAILY
Qty: 135 TABLET | Refills: 1 | Status: SHIPPED | OUTPATIENT
Start: 2024-09-24

## 2025-01-02 ENCOUNTER — TELEPHONE (OUTPATIENT)
Dept: CARDIOLOGY | Facility: CLINIC | Age: 40
End: 2025-01-02
Payer: COMMERCIAL

## 2025-01-02 DIAGNOSIS — E78.2 MIXED HYPERLIPIDEMIA: ICD-10-CM

## 2025-01-02 RX ORDER — ROSUVASTATIN CALCIUM 20 MG/1
30 TABLET, COATED ORAL DAILY
Qty: 135 TABLET | Refills: 0 | Status: SHIPPED | OUTPATIENT
Start: 2025-01-02

## 2025-07-19 ENCOUNTER — HEALTH MAINTENANCE LETTER (OUTPATIENT)
Age: 40
End: 2025-07-19

## 2025-07-23 ENCOUNTER — TELEPHONE (OUTPATIENT)
Dept: CARDIOLOGY | Facility: CLINIC | Age: 40
End: 2025-07-23
Payer: COMMERCIAL

## 2025-07-23 DIAGNOSIS — I25.10 CORONARY ARTERY DISEASE INVOLVING NATIVE CORONARY ARTERY OF NATIVE HEART WITHOUT ANGINA PECTORIS: ICD-10-CM

## 2025-07-23 RX ORDER — CLOPIDOGREL BISULFATE 75 MG/1
75 TABLET ORAL DAILY
Qty: 90 TABLET | Refills: 0 | Status: SHIPPED | OUTPATIENT
Start: 2025-07-23

## 2025-07-23 RX ORDER — LISINOPRIL 2.5 MG/1
2.5 TABLET ORAL DAILY
Qty: 90 TABLET | Refills: 0 | Status: SHIPPED | OUTPATIENT
Start: 2025-07-23

## (undated) RX ORDER — FENTANYL CITRATE 50 UG/ML
INJECTION, SOLUTION INTRAMUSCULAR; INTRAVENOUS
Status: DISPENSED
Start: 2018-04-24

## (undated) RX ORDER — VERAPAMIL HYDROCHLORIDE 2.5 MG/ML
INJECTION, SOLUTION INTRAVENOUS
Status: DISPENSED
Start: 2018-04-24

## (undated) RX ORDER — NITROGLYCERIN 5 MG/ML
VIAL (ML) INTRAVENOUS
Status: DISPENSED
Start: 2018-04-24

## (undated) RX ORDER — LIDOCAINE HYDROCHLORIDE 10 MG/ML
INJECTION, SOLUTION EPIDURAL; INFILTRATION; INTRACAUDAL; PERINEURAL
Status: DISPENSED
Start: 2018-04-24

## (undated) RX ORDER — HEPARIN SODIUM 1000 [USP'U]/ML
INJECTION, SOLUTION INTRAVENOUS; SUBCUTANEOUS
Status: DISPENSED
Start: 2018-04-24